# Patient Record
Sex: MALE | Race: BLACK OR AFRICAN AMERICAN | Employment: UNEMPLOYED | URBAN - METROPOLITAN AREA
[De-identification: names, ages, dates, MRNs, and addresses within clinical notes are randomized per-mention and may not be internally consistent; named-entity substitution may affect disease eponyms.]

---

## 2018-01-10 ENCOUNTER — IP HISTORICAL/CONVERTED ENCOUNTER (OUTPATIENT)
Dept: OTHER | Age: 63
End: 2018-01-10

## 2020-04-27 ENCOUNTER — OP HISTORICAL/CONVERTED ENCOUNTER (OUTPATIENT)
Dept: OTHER | Age: 65
End: 2020-04-27

## 2020-06-17 PROBLEM — C61 PROSTATE CANCER (HCC): Status: ACTIVE | Noted: 2020-06-17

## 2020-06-25 VITALS
HEIGHT: 72 IN | TEMPERATURE: 98.8 F | WEIGHT: 150 LBS | BODY MASS INDEX: 20.32 KG/M2 | DIASTOLIC BLOOD PRESSURE: 80 MMHG | SYSTOLIC BLOOD PRESSURE: 118 MMHG

## 2020-06-25 DIAGNOSIS — R97.20 ELEVATED PROSTATE SPECIFIC ANTIGEN (PSA): ICD-10-CM

## 2020-06-25 DIAGNOSIS — C61 MALIGNANT NEOPLASM OF PROSTATE (HCC): ICD-10-CM

## 2020-06-25 DIAGNOSIS — R39.9 UNSPECIFIED SYMPTOMS AND SIGNS INVOLVING THE GENITOURINARY SYSTEM: ICD-10-CM

## 2020-06-25 RX ORDER — METFORMIN HYDROCHLORIDE 500 MG/1
TABLET ORAL 2 TIMES DAILY WITH MEALS
COMMUNITY
End: 2020-11-11

## 2020-06-25 RX ORDER — ASPIRIN 81 MG/1
TABLET ORAL DAILY
COMMUNITY
End: 2022-08-08 | Stop reason: SDUPTHER

## 2020-06-30 ENCOUNTER — OP HISTORICAL/CONVERTED ENCOUNTER (OUTPATIENT)
Dept: OTHER | Age: 65
End: 2020-06-30

## 2020-08-06 ENCOUNTER — OP HISTORICAL/CONVERTED ENCOUNTER (OUTPATIENT)
Dept: OTHER | Age: 65
End: 2020-08-06

## 2020-08-10 ENCOUNTER — OFFICE VISIT (OUTPATIENT)
Dept: UROLOGY | Age: 65
End: 2020-08-10

## 2020-08-10 ENCOUNTER — OP HISTORICAL/CONVERTED ENCOUNTER (OUTPATIENT)
Dept: OTHER | Age: 65
End: 2020-08-10

## 2020-08-17 PROBLEM — F84.0 AUTISM: Status: ACTIVE | Noted: 2020-08-17

## 2020-08-17 PROBLEM — N28.1 RENAL CYST: Status: ACTIVE | Noted: 2020-08-17

## 2020-08-17 PROBLEM — K76.9 HEPATIC LESION: Status: ACTIVE | Noted: 2020-08-17

## 2020-08-17 PROBLEM — N20.0 KIDNEY STONE: Status: ACTIVE | Noted: 2020-08-17

## 2020-08-17 PROBLEM — R32 URINARY INCONTINENCE: Status: ACTIVE | Noted: 2020-08-17

## 2020-08-17 NOTE — PROGRESS NOTES
HISTORY OF PRESENT ILLNESS  Olivia Melton. is a 60 y.o. male. He is s/p prostatectomy and PLND on 8/10/2020. Pathology not available at this visit. He is here today for his 1 week f/u and voiding trial.  He was slow to be compliant with oral intake and ambulation in the hospital and his discharge was prolonged x 1 day. His sister noted some form of mild autism, though the patient does live alone and functions independently. He is s/p biopsy on 6/5/2020. He has Derrick's 6 disease in 3/12 cores with up to 70% involvment and Derrick's 3+4 disease in 1/12 cores with 80% involvement. He also has 2 additional cores suspicious for but not diagnostic of malignancy. CT scan 6/30/2020 with mildly prominent bilateral inguinal lymph nodes, maximally 1.6cm. CT also noted: hepatic lesions (likely cysts or benign hemangioma), renal cysts bilaterally. Bone scan negative for findings concerning for metastatic malignancy. He feels like he has recovered very well and he denies ongoing pain. He is still wearing his SCD sleeves. He is educated that they are ok to be removed also. He is having regular, daily BM. Problem List  Never Reviewed          Codes Class Noted    Urinary incontinence ICD-10-CM: R32  ICD-9-CM: 788.30  8/17/2020    Overview Signed 8/17/2020  9:34 AM by Haylee Leonardo NP     He is s/p prostatectomy. Luther removed today. Educated on kegels and the use of protective undergarments. Autism ICD-10-CM: F84.0  ICD-9-CM: 299.00  8/17/2020    Overview Signed 8/17/2020  9:35 AM by Haylee Leonardo NP     Sister reports a mild form of autism. Lives alone, independent. Hepatic lesion ICD-10-CM: K76.9  ICD-9-CM: 573.8  8/17/2020    Overview Signed 8/17/2020  9:36 AM by Haylee Leonardo NP     CT scan 6/30/2020 multiple hepatic lesions likley a combination of benign hemangiomata and cysts.                Renal cyst ICD-10-CM: N28.1  ICD-9-CM: 753.10  8/17/2020 Overview Signed 8/17/2020  9:36 AM by Edel Sosa NP     CT scan 6/30/2020 with bilateral renal cysts, larges ton the left at 4.9 cm. Kidney stone ICD-10-CM: N20.0  ICD-9-CM: 592.0  8/17/2020    Overview Signed 8/17/2020  9:37 AM by Edel Sosa NP     2mm non-obstructive stone seen on CT scan 6/30/2020 right lower pole. Elevated prostate specific antigen (PSA) ICD-10-CM: R97.20  ICD-9-CM: 790.93  6/25/2020    Overview Addendum 8/17/2020  9:33 AM by Edel Sosa NP     He was referred from Dr. Vickey Hollins for elevated PSA.  5/14/2020: 36.8 with 4.3% free. 2/10/2020: 36.3  1/2/2020: 29.5  Right prostate firm and enlarged. Diagnosed with Derrick's 6/7 disease on 6/5/2020. He is s/p prostatectomy on 8/10/2020. Unspecified symptoms and signs involving the genitourinary system ICD-10-CM: R39.9  ICD-9-CM: 788.99  6/25/2020    Overview Signed 6/25/2020 10:07 AM by Garrison Capone CMA     Elevated PSA with enlarged, firm prostate on exam on 5/14/2020.  06- visit Right prostate enlarged and firm, concerning in light of the elevated PSA. Malignant neoplasm of prostate [facility]) ICD-10-CM: C61  ICD-9-CM: 279  6/17/2020    Overview Addendum 8/17/2020  9:30 AM by Edel Sosa NP     PSA 36.8 with 4.3% free. He is s/p biopsy on 6/5/2020. He has Derrick's 6 disease in 3/12 cores with up to 70% involvment and Derrick's 3+4 disease in 1/12 cores with 80% involvement. He also has 2 additional cores suspicious for but not diagnostic of malignancy. He has unfavorable intermediate to high risk disease. He is s/p prostatectomy and PLND on 8/10/2020. Review of Systems   Gastrointestinal:        Per HPI   Genitourinary:        Per HPI   All other systems reviewed and are negative. Physical Exam  Vitals signs reviewed. HENT:      Head: Normocephalic and atraumatic. Eyes:      Extraocular Movements: Extraocular movements intact.    Neck: Musculoskeletal: Normal range of motion. Cardiovascular:      Rate and Rhythm: Normal rate. Pulmonary:      Effort: Pulmonary effort is normal.      Breath sounds: Normal breath sounds. Abdominal:      General: There is no distension. Palpations: Abdomen is soft. Musculoskeletal: Normal range of motion. Skin:     General: Skin is warm and dry. Neurological:      Mental Status: He is alert and oriented to person, place, and time. Mental status is at baseline. Psychiatric:         Mood and Affect: Mood normal.           ASSESSMENT and PLAN  Diagnoses and all orders for this visit:    1. Malignant neoplasm of prostate [facility])  Assessment & Plan:  He has unfavorable, intermediate to high risk disease. He is s/p prostatectomy on 8/10/2020. Pathology not available at time of this visit. F/U in 1 month with PSA prior, review pathology at that time. Key Oncology Meds     Patient is on no Oncologic meds. Key Pain Meds     The patient is on no pain meds. No results found for: WBC, WBCT, WBCPOC, ANEU, HGB, HGBPOC, HCT, HCTPOC, PLT, PLTPOC, CREA, CREAPOC, CREATEXT, BUN, IBUN, BUNPOC, ALTPOC, ALT, ASTPOC, ALB, ALBPOC, PREALB, PSA, PSA2, [id number], PSALT, HGBEXT, HCTEXT, PLTEXT    Orders:  -     PSA, DIAGNOSTIC (PROSTATE SPECIFIC AG); Future    2. Elevated prostate specific antigen (PSA)  Assessment & Plan:  He is s/p prostatectomy. Repeat PSA at 1 month. F/U after. Review pathology at that time. 3. Urinary incontinence, unspecified type    4. Autism    5. Hepatic lesion    6. Renal cyst    7.  Kidney stone      PSA in 1m    Ni Parham NP

## 2020-08-17 NOTE — ASSESSMENT & PLAN NOTE
He has unfavorable, intermediate to high risk disease. He is s/p prostatectomy on 8/10/2020. Pathology not available at time of this visit. F/U in 1 month with PSA prior, review pathology at that time. Key Oncology Meds     Patient is on no Oncologic meds. Key Pain Meds     The patient is on no pain meds.         No results found for: WBC, WBCT, WBCPOC, ANEU, HGB, HGBPOC, HCT, HCTPOC, PLT, PLTPOC, CREA, CREAPOC, CREATEXT, BUN, IBUN, BUNPOC, ALTPOC, ALT, ASTPOC, ALB, ALBPOC, PREALB, PSA, PSA2, [id number], PSALT, HGBEXT, HCTEXT, PLTEXT

## 2020-08-18 ENCOUNTER — OFFICE VISIT (OUTPATIENT)
Dept: UROLOGY | Age: 65
End: 2020-08-18
Payer: MEDICAID

## 2020-08-18 VITALS
DIASTOLIC BLOOD PRESSURE: 72 MMHG | BODY MASS INDEX: 19.91 KG/M2 | HEART RATE: 55 BPM | TEMPERATURE: 96.6 F | SYSTOLIC BLOOD PRESSURE: 118 MMHG | WEIGHT: 147 LBS | HEIGHT: 72 IN

## 2020-08-18 DIAGNOSIS — K76.9 HEPATIC LESION: ICD-10-CM

## 2020-08-18 DIAGNOSIS — N28.1 RENAL CYST: ICD-10-CM

## 2020-08-18 DIAGNOSIS — C61 MALIGNANT NEOPLASM OF PROSTATE (HCC): ICD-10-CM

## 2020-08-18 DIAGNOSIS — R97.20 ELEVATED PROSTATE SPECIFIC ANTIGEN (PSA): ICD-10-CM

## 2020-08-18 DIAGNOSIS — N20.0 KIDNEY STONE: ICD-10-CM

## 2020-08-18 DIAGNOSIS — R32 URINARY INCONTINENCE, UNSPECIFIED TYPE: ICD-10-CM

## 2020-08-18 DIAGNOSIS — F84.0 AUTISM: ICD-10-CM

## 2020-08-18 PROCEDURE — 99024 POSTOP FOLLOW-UP VISIT: CPT | Performed by: UROLOGY

## 2020-08-18 NOTE — PROGRESS NOTES
FILL & PULL:   PT WAS FILLED WITH 75 ML STERILE WATER   CATH.  WAS REMOVED WITHOUT DIFFICULTY  PT VOIDED 25ml

## 2020-08-18 NOTE — PATIENT INSTRUCTIONS
Kegel Exercise For Men     Kegel exercises in men are used to stregthen the pelvic floor just as they are for women. They are reffered to as kegels or pelvic floor exercises. The muscle is similar in both men and women, stretching from the pubic bone to the tailbone and forming a hammock- like floor that supports the organs linda the pelvis and contributes to the function of the sphincter. The exercise is recommended as a first step for treating urinary incontinence. Once you have become accustomed to tightening the muscle, Kegel exercises can be done anytime, anywhere. How do I locate the right muscles?  One of the easiest ways to locate your muscles is during urination. Once your flow begins, try to stop it completely. The muscle that you feel tightening is the urinary sphincter muscle or pelvic floor. Dont tense the muscles in your buttocks, legs or abdomen and dont hold your breath.  When you can slow or stop the flow of urine, you've succesfully located these muscles. When you perform the exercise correctly, You should be able to feel or see penis or the testicles lift.  Some men find these muscles by imaging that they are trying to stop the passage of gas. If you dont get it, you may try inserting a finger (use lubrication) into the anus and try to  your finger. If you are able to do that, you've found the right muscle     When you're first starting, it may be easier to do kegel exercises lying down, so your muscles arent fighting against gravity. It may also be easier to contract the muscles for just two or three seconds at first. There are several ways to perform kegel exercises. At first you may need to perform these exercises while sitting or lying down. As the muscles stregthen you can progress to exercise standing up. Like any activity, start with what you can achieve and progress from there. Remember to use your muscles whenever you exert yourself during your daily activities.  Do NOT hold your breath   Do NOT push down instead of squeeze and lift    Do NOT pull your tummy in tightly    Do NOT tighten your buttocks and thighs     In order to improve strength of the muscle, you use a squeeze, hold, release pattern. You hold for several seconds and release. Gradually, you would like to be able to build up to a hold of  squeeze, hold and release 5 to 10 times in a row. When you perform the exercise correctly, you should be able to feel or see penis or the testicles lift. This takes time for some men. But, if you practice the routine regularly, you should notice an improvement in 4-6 weeks. A few good contractions are more beneficial than many half- hearted ones and good results take time and effort. Your doctor recommends a goal of 5 sets of 5 contractions daily. Remember them when you wake, when you sleep and every meal.     Remember when to use the muscles when you need them most. That is, always tighten before you cough, sneeze, lift, bend or get up out of a chair, ect. Your doctor does not recommend interrupting your flow as a way to practice kegels. It is only a method to help identify the muscles. The urinary sphincter muscles matter more than the anal sphincter muscles. Some men can isolate these forward muscles rather than squeezing the entire pelvic floor. Hints:    Keep your weight within a healthy range for your height and age   Drink enough water to keep the urine light in color    Seek medical advice for chronic cough    Develope good bowel habits     If you have trouble performing kegel exercises on your own, you may need help. Improved results can be achieved by seeking help from physiotherapist (with traning in continence) who will design an individual training program exspecially suited to you.  Call your doctor if you would like to participate in a training program with a physical therapist.

## 2020-09-10 DIAGNOSIS — C61 MALIGNANT NEOPLASM OF PROSTATE (HCC): ICD-10-CM

## 2020-09-21 ENCOUNTER — OFFICE VISIT (OUTPATIENT)
Dept: INTERNAL MEDICINE CLINIC | Age: 65
End: 2020-09-21
Payer: MEDICAID

## 2020-09-21 ENCOUNTER — TELEPHONE (OUTPATIENT)
Dept: UROLOGY | Age: 65
End: 2020-09-21

## 2020-09-21 VITALS
RESPIRATION RATE: 18 BRPM | DIASTOLIC BLOOD PRESSURE: 84 MMHG | OXYGEN SATURATION: 99 % | BODY MASS INDEX: 20.94 KG/M2 | WEIGHT: 154.6 LBS | HEART RATE: 61 BPM | SYSTOLIC BLOOD PRESSURE: 122 MMHG | TEMPERATURE: 98.1 F | HEIGHT: 72 IN

## 2020-09-21 DIAGNOSIS — E08.9 DIABETES MELLITUS DUE TO UNDERLYING CONDITION WITHOUT COMPLICATION, WITHOUT LONG-TERM CURRENT USE OF INSULIN (HCC): ICD-10-CM

## 2020-09-21 DIAGNOSIS — R97.20 ELEVATED PROSTATE SPECIFIC ANTIGEN (PSA): ICD-10-CM

## 2020-09-21 DIAGNOSIS — C61 MALIGNANT NEOPLASM OF PROSTATE (HCC): ICD-10-CM

## 2020-09-21 DIAGNOSIS — C61 PROSTATE CARCINOMA (HCC): Primary | ICD-10-CM

## 2020-09-21 DIAGNOSIS — H25.011 CORTICAL AGE-RELATED CATARACT OF RIGHT EYE: ICD-10-CM

## 2020-09-21 PROCEDURE — 99214 OFFICE O/P EST MOD 30 MIN: CPT | Performed by: INTERNAL MEDICINE

## 2020-09-21 NOTE — TELEPHONE ENCOUNTER
Spoke with pt to remind him to get psa drawn prior to appointment tomorrow. Pt said he would try. . didn't seem like he was up to it.    Order is in

## 2020-09-21 NOTE — PROGRESS NOTES
Toi Dee. is a 60 y.o. male and presents with No chief complaint on file. Patient status post robotic prostatectomy for prostate carcinoma doing well well-healed scars no urinary incontinence very pleasant undergoing right cataract surgery seen by Dr. Dallas Singh and I will send records. We reviewed his medication continues on metformin probenecid Colcrys as needed for gout lungs clear heart rate regular rate and rhythm General exam performed for preop he has lost weight and gained some back and wishes not to change the 150 pounds he is at this time we reviewed last labs June of this year revealing creatinine 0.91 prostate specific antigen 36.3 BUN 18 hemoglobin A1c 14 patient will try to watch diet bilateral cataracts noted           Review of Systems  Constitutional: negative for fevers, chills, anorexia, noted weight loss and fatigue,no insomnia  Eyes:   negative for visual disturbance and irritation, eye discharge, eye pain. no eye redness. ENT:   negative for tinnitus, sore throat, nasal congestion, ear pain, hoarseness, hearing loss.,no snoring. Respiratory:  negative for cough, hemoptysis, shortness of breath, wheezing,  CV:   negative for chest pain, palpitations, lower extremity edema, shortness of breath while sitting, walking or at night  GI:   negative for nausea, vomiting, diarrhea, abdominal pain,melena,constipation. Endo:               negative for polyuria, polydipsia, polyphagia, cold or heat intolerance,hair loss. Genitourinary: negative for frequency, dysuria and hematuria,urethral discharge,nocturia.straining while urination,urinary incontinence. Integument:  negative for rash and pruritus  Hematologic:  negative for easy bruising and gum/nose bleeding, enlarged nodes  Musculoskel: negative for myalgias, arthralgias, back pain, muscle weakness, noted joint pain, h/o fall,cramps,calf pain.   DJD changes  Neurological:  negative for headaches, dizziness, vertigo, memory problems, gait and seizures loss of consciousness,no ataxia. Behavl/Psych: negative for feelings of anxiety, depression, occasional mood changes ,sadness    Past Medical History:   Diagnosis Date    Arthritis     Diabetes ([facility] [address]     Elevated prostate specific antigen (PSA) 6/25/2020    He is referred from Dr. Ronak Villalobos for elevated PSA. 5/14/2020: 36.8 with 4.3% free. 2/10/2020: 36.3 1/2/2020: 29.5 Right prostate firm and enlarged. 06- visit He has a markedly elevated PSA. He underwent biopsy today, 6/5/20. He is instructed to take his antibiotics as prescribed. He was given post procedure instructions.  Unspecified symptoms and signs involving the genitourinary system 6/25/2020    Elevated PSA with enlarged, firm prostate on exam on 5/14/2020. 06- visit Right prostate enlarged and firm, concerning in light of the elevated PSA. Past Surgical History:   Procedure Laterality Date    HX OTHER SURGICAL      PROCEDURE ON EAR     HX OTHER SURGICAL      PROCEDURE ON EAR     HX PROSTATE SURGERY  08/10/2020     pelvic lymph node dissection    HX PROSTATECTOMY  08/10/2020    [place] PROSTATE BIOPSY, NEEDLE, SATURATION SAMPLING  06/05/2020     Social History     Socioeconomic History    Marital status: SINGLE     Spouse name: Not on file    Number of children: Not on file    Years of education: Not on file    Highest education level: Not on file   Occupational History    Occupation: RETIRED   Tobacco Use    Smoking status: Never Smoker    Smokeless tobacco: Never Used   Substance and Sexual Activity    Alcohol use: Not Currently    Drug use: Never   Other Topics Concern     Family History   Problem Relation Age of Onset    Diabetes Mother     Cancer Father      Current Outpatient Medications   Medication Sig Dispense Refill    aspirin delayed-release 81 mg tablet Take  by mouth daily.  metFORMIN (GLUCOPHAGE) 500 mg tablet Take  by mouth two (2) times daily (with meals).        No Known Allergies    Objective:  Visit Vitals  /84 (BP 1 Location: Right arm, BP Patient Position: Sitting)   Pulse 61   Temp 98.1 °F (36.7 °C) (Oral)   Resp 18   Ht 6' (1.829 m)   Wt 154 lb 9.6 oz (70.1 kg)   SpO2 99%   BMI 20.97 kg/m²       Physical Exam:   Constitutional: General Appearance: healthy-appearing and obese. Level of Distress: NAD. Ambulation: ambulating normally. Psychiatric: Mental Status: normal mood and affect and active and alert. Orientation: to time, place, and person. no agitation. ,normal eye contact. normal insight  Head: Head: normocephalic and atraumatic. Eyes: Pupils: PERRLA. Sclerae: non-icteric. ENMT: No lesions on external ear, no hearing loss. No lesions on external nose, sinus tenderness, or nasal discharge. Lips, Teeth, and no mouth or lip ulcers   Neck: Neck: supple, trachea midline, and no masses. Lymph Nodes: no cervical LAD. Thyroid: no enlargement or nodules and non-tender. Lungs: Respiratory effort: no dyspnea. Auscultation: no wheezing, rales/crackles, or rhonchi and breath sounds normal and good air movement. Cardiovascular: Apical Impulse: not displaced. Heart Auscultation: normal S1 and S2; no murmurs, rubs, or gallops; and RRR. Neck vessels: no carotid bruits. Pulses including femoral / pedal: normal throughout. Abdomen: Bowel Sounds: normal. Inspection and Palpation: no tenderness, guarding, or masses and soft and non-distended. Liver: non-tender and no hepatomegaly. Spleen: non-tender and no splenomegaly  Musculoskeletal[de-identified] Extremities: no edema or varicosities. Calf tenderness. DJD changes  Neurologic: Gait and Station: normal gait and station. Motor Strength normal right and left. Sensory and cerebellar intact. Skin: Inspection and palpation: no rash, lesions, or ulcer. No results found for this or any previous visit. Assessment/Plan:    ICD-10-CM ICD-9-CM    1.  Prostate carcinoma ([facility] [address]  D73 404 METABOLIC PANEL, BASIC      CBC WITH AUTOMATED DIFF PSA W/ REFLX FREE PSA   2. Diabetes mellitus due to underlying condition without complication, without long-term current use of insulin (HCC)  E08.9 249.00 HEMOGLOBIN A1C WITH EAG   3. Malignant neoplasm of prostate ([facility] [address]  C61 185    4. Elevated prostate specific antigen (PSA)  R97.20 790.93    5. Cortical age-related cataract of right eye  H25.011 366.15      Orders Placed This Encounter    METABOLIC PANEL, BASIC    HEMOGLOBIN A1C WITH EAG    CBC WITH AUTOMATED DIFF    PSA W/ REFLX FREE PSA       continue present plan, routine labs ordered, call if any problems    There are no Patient Instructions on file for this visit. Follow-up and Dispositions    · Return in about 6 months (around 3/21/2021).

## 2020-09-22 ENCOUNTER — OFFICE VISIT (OUTPATIENT)
Dept: UROLOGY | Age: 65
End: 2020-09-22
Payer: MEDICAID

## 2020-09-22 VITALS — BODY MASS INDEX: 20.59 KG/M2 | HEIGHT: 72 IN | TEMPERATURE: 97.6 F | WEIGHT: 152 LBS

## 2020-09-22 DIAGNOSIS — N28.1 RENAL CYST: ICD-10-CM

## 2020-09-22 DIAGNOSIS — R32 URINARY INCONTINENCE, UNSPECIFIED TYPE: ICD-10-CM

## 2020-09-22 DIAGNOSIS — F84.0 AUTISM: ICD-10-CM

## 2020-09-22 DIAGNOSIS — C61 MALIGNANT NEOPLASM OF PROSTATE (HCC): Primary | ICD-10-CM

## 2020-09-22 LAB
BASOPHILS # BLD AUTO: 0 X10E3/UL (ref 0–0.2)
BASOPHILS NFR BLD AUTO: 1 %
BILIRUB UR QL STRIP: NEGATIVE
BUN SERPL-MCNC: 18 MG/DL (ref 8–27)
BUN/CREAT SERPL: 22 (ref 10–24)
CALCIUM SERPL-MCNC: 9.9 MG/DL (ref 8.6–10.2)
CHLORIDE SERPL-SCNC: 101 MMOL/L (ref 96–106)
CO2 SERPL-SCNC: 23 MMOL/L (ref 20–29)
CREAT SERPL-MCNC: 0.82 MG/DL (ref 0.76–1.27)
EOSINOPHIL # BLD AUTO: 0.1 X10E3/UL (ref 0–0.4)
EOSINOPHIL NFR BLD AUTO: 3 %
ERYTHROCYTE [DISTWIDTH] IN BLOOD BY AUTOMATED COUNT: 13.4 % (ref 11.6–15.4)
EST. AVERAGE GLUCOSE BLD GHB EST-MCNC: 154 MG/DL
GLUCOSE SERPL-MCNC: 112 MG/DL (ref 65–99)
GLUCOSE UR-MCNC: NEGATIVE MG/DL
HBA1C MFR BLD: 7 % (ref 4.8–5.6)
HCT VFR BLD AUTO: 44 % (ref 37.5–51)
HGB BLD-MCNC: 14.2 G/DL (ref 13–17.7)
IMM GRANULOCYTES # BLD AUTO: 0 X10E3/UL (ref 0–0.1)
IMM GRANULOCYTES NFR BLD AUTO: 0 %
KETONES P FAST UR STRIP-MCNC: NEGATIVE MG/DL
LYMPHOCYTES # BLD AUTO: 1.5 X10E3/UL (ref 0.7–3.1)
LYMPHOCYTES NFR BLD AUTO: 44 %
MCH RBC QN AUTO: 29.9 PG (ref 26.6–33)
MCHC RBC AUTO-ENTMCNC: 32.3 G/DL (ref 31.5–35.7)
MCV RBC AUTO: 93 FL (ref 79–97)
MONOCYTES # BLD AUTO: 0.3 X10E3/UL (ref 0.1–0.9)
MONOCYTES NFR BLD AUTO: 8 %
NEUTROPHILS # BLD AUTO: 1.5 X10E3/UL (ref 1.4–7)
NEUTROPHILS NFR BLD AUTO: 44 %
PH UR STRIP: 5 [PH] (ref 4.6–8)
PLATELET # BLD AUTO: 323 X10E3/UL (ref 150–450)
POTASSIUM SERPL-SCNC: 4.3 MMOL/L (ref 3.5–5.2)
PROT UR QL STRIP: NORMAL
PSA SERPL-MCNC: <0.1 NG/ML (ref 0–4)
RBC # BLD AUTO: 4.75 X10E6/UL (ref 4.14–5.8)
REFLEX CRITERIA: NORMAL
SODIUM SERPL-SCNC: 140 MMOL/L (ref 134–144)
SP GR UR STRIP: 1.02 (ref 1–1.03)
UA UROBILINOGEN AMB POC: NORMAL (ref 0.2–1)
URINALYSIS CLARITY POC: CLEAR
URINALYSIS COLOR POC: YELLOW
URINE BLOOD POC: NORMAL
URINE LEUKOCYTES POC: NEGATIVE
URINE NITRITES POC: NEGATIVE
WBC # BLD AUTO: 3.4 X10E3/UL (ref 3.4–10.8)

## 2020-09-22 PROCEDURE — 99024 POSTOP FOLLOW-UP VISIT: CPT | Performed by: UROLOGY

## 2020-09-22 PROCEDURE — 81003 URINALYSIS AUTO W/O SCOPE: CPT | Performed by: UROLOGY

## 2020-09-22 NOTE — PROGRESS NOTES
HISTORY OF PRESENT ILLNESS  Noemi Peralta is a 60 y.o. male. Chief Complaint   Patient presents with    Follow-up    Prostate Cancer    Urinary Incontinence    Elevated PSA     He is doing well s/p a prostatectomy 8/20/20. High risk disease. He had a PSA drawn yesterday, not resulted yet. Problem List  Date Reviewed: 9/21/2020          Codes Class Noted    Urinary incontinence ICD-10-CM: R32  ICD-9-CM: 788.30  8/17/2020    Overview Addendum 9/21/2020  3:14 PM by Netta Tovar NP     He is s/p prostatectomy 8/2020. Educated on kegels and the use of protective undergarments. Autism ICD-10-CM: F84.0  ICD-9-CM: 299.00  8/17/2020    Overview Signed 8/17/2020  9:35 AM by Netta Tovar NP     Sister reports a mild form of autism. Lives alone, independent. Hepatic lesion ICD-10-CM: K76.9  ICD-9-CM: 573.8  8/17/2020    Overview Signed 8/17/2020  9:36 AM by Netta Tovar NP     CT scan 6/30/2020 multiple hepatic lesions likley a combination of benign hemangiomata and cysts. Renal cyst ICD-10-CM: N28.1  ICD-9-CM: 753.10  8/17/2020    Overview Addendum 9/21/2020  3:15 PM by Netta Tovar NP     CT scan 6/30/2020 with bilateral renal cysts, largest on the left at 4.9 cm. Kidney stone ICD-10-CM: N20.0  ICD-9-CM: 592.0  8/17/2020    Overview Signed 8/17/2020  9:37 AM by Netta Tovar NP     2mm non-obstructive stone seen on CT scan 6/30/2020 right lower pole. Elevated prostate specific antigen (PSA) ICD-10-CM: R97.20  ICD-9-CM: 790.93  6/25/2020    Overview Addendum 8/17/2020  9:33 AM by Netta Tovar NP     He was referred from Dr. Jami Alvarez for elevated PSA.  5/14/2020: 36.8 with 4.3% free. 2/10/2020: 36.3  1/2/2020: 29.5  Right prostate firm and enlarged. Diagnosed with Derrick's 6/7 disease on 6/5/2020. He is s/p prostatectomy on 8/10/2020.              Unspecified symptoms and signs involving the genitourinary system ICD-10-CM: R39.9  ICD-9-CM: 788.99  6/25/2020    Overview Signed 6/25/2020 10:07 AM by Kaylin Muñoz CMA     Elevated PSA with enlarged, firm prostate on exam on 5/14/2020.  06- visit Right prostate enlarged and firm, concerning in light of the elevated PSA. Malignant neoplasm of prostate [facility]) ICD-10-CM: C61  ICD-9-CM: 555  6/17/2020    Overview Addendum 9/22/2020  9:59 AM by Antwon Mahan MD     PSA 36.8 with 4.3% free. He is s/p biopsy on 6/5/2020. He has Derrick's 6 disease in 3/12 cores with up to 70% involvment and Derrick's 3+4 disease in 1/12 cores with 80% involvement. He also has 2 additional cores suspicious for but not diagnostic of malignancy. He has unfavorable intermediate to high risk disease. He is s/p prostatectomy and PLND on 8/10/2020. Bladder neck margin positive, Pritchett's 4+4, [id number]. Review of Systems   All other systems reviewed and are negative. Past Medical History:   Diagnosis Date    Arthritis     Diabetes ([facility] [address]     Elevated prostate specific antigen (PSA) 6/25/2020    He is referred from Dr. Gregory Nj for elevated PSA. 5/14/2020: 36.8 with 4.3% free. 2/10/2020: 36.3 1/2/2020: 29.5 Right prostate firm and enlarged. 06- visit He has a markedly elevated PSA. He underwent biopsy today, 6/5/20. He is instructed to take his antibiotics as prescribed. He was given post procedure instructions.  Unspecified symptoms and signs involving the genitourinary system 6/25/2020    Elevated PSA with enlarged, firm prostate on exam on 5/14/2020. 06- visit Right prostate enlarged and firm, concerning in light of the elevated PSA.       Past Surgical History:   Procedure Laterality Date    HX OTHER SURGICAL      PROCEDURE ON EAR     HX OTHER SURGICAL      PROCEDURE ON EAR     HX PROSTATE SURGERY  08/10/2020     pelvic lymph node dissection    HX PROSTATECTOMY  08/10/2020    [place] PROSTATE BIOPSY, NEEDLE, SATURATION SAMPLING 06/05/2020     Family History   Problem Relation Age of Onset    Diabetes Mother     Cancer Father         Physical Exam  Constitutional:       Appearance: Normal appearance. HENT:      Head: Normocephalic and atraumatic. Nose: Nose normal.   Eyes:      Extraocular Movements: Extraocular movements intact. Conjunctiva/sclera: Conjunctivae normal.   Pulmonary:      Effort: Pulmonary effort is normal.   Neurological:      Mental Status: He is alert and oriented to person, place, and time. Psychiatric:         Mood and Affect: Mood normal.         Behavior: Behavior normal.           Results for orders placed or performed in visit on 09/22/20   AMB POC URINALYSIS DIP STICK AUTO W/O MICRO     Status: None   Result Value Ref Range Status    Color (UA POC) Yellow  Final    Clarity (UA POC) Clear  Final    Glucose (UA POC) Negative Negative Final    Bilirubin (UA POC) Negative Negative Final    Ketones (UA POC) Negative Negative Final    Specific gravity (UA POC) 1.025 1.001 - 1.035 Final    Blood (UA POC) Trace Negative Final    pH (UA POC) 5.0 4.6 - 8.0 Final    Protein (UA POC) 3+ Negative Final    Urobilinogen (UA POC) 0.2 mg/dL 0.2 - 1 Final    Nitrites (UA POC) Negative Negative Final    Leukocyte esterase (UA POC) Negative Negative Final               No results found for: PVRPOC  No results found for: PSA2, PSAR1     ASSESSMENT and PLAN  Diagnoses and all orders for this visit:    1. Malignant neoplasm of prostate [facility])  Assessment & Plan:  High risk disease s/p prostatectomy. Check PSA as ordered. Decipher testing. He will benefit from adjuvant therapy. He is not interested in early treatment. Orders:  -     PSA, DIAGNOSTIC (PROSTATE SPECIFIC AG); Future    2. Urinary incontinence, unspecified type  -     AMB POC URINALYSIS DIP STICK AUTO W/O MICRO  -     URINALYSIS W/MICROSCOPIC    3. Renal cyst    4.  Autism         Follow-up and Dispositions    · Return in about 2 months (around 11/22/2020) for PSA prior.          Grant Orellana MD

## 2020-09-22 NOTE — ASSESSMENT & PLAN NOTE
High risk disease s/p prostatectomy. Check PSA as ordered. Decipher testing. He will benefit from adjuvant therapy. He is not interested in early treatment.

## 2020-09-23 LAB
APPEARANCE UR: CLEAR
BACTERIA #/AREA URNS HPF: ABNORMAL /[HPF]
BILIRUB UR QL STRIP: NEGATIVE
CASTS URNS QL MICRO: ABNORMAL /LPF
COLOR UR: YELLOW
EPI CELLS #/AREA URNS HPF: ABNORMAL /HPF (ref 0–10)
GLUCOSE UR QL: NEGATIVE
HGB UR QL STRIP: ABNORMAL
KETONES UR QL STRIP: NEGATIVE
LEUKOCYTE ESTERASE UR QL STRIP: ABNORMAL
MICRO URNS: ABNORMAL
MUCOUS THREADS URNS QL MICRO: PRESENT
NITRITE UR QL STRIP: NEGATIVE
PH UR STRIP: 5 [PH] (ref 5–7.5)
PROT UR QL STRIP: ABNORMAL
RBC #/AREA URNS HPF: >30 /HPF (ref 0–2)
SP GR UR: 1.02 (ref 1–1.03)
UROBILINOGEN UR STRIP-MCNC: 0.2 MG/DL (ref 0.2–1)
WBC #/AREA URNS HPF: ABNORMAL /HPF (ref 0–5)

## 2020-09-23 NOTE — PROGRESS NOTES
Call patient to know all results are all good they cannot detect PSA at all which is good please send copies of these labs to his urologist

## 2020-11-03 ENCOUNTER — OFFICE VISIT (OUTPATIENT)
Dept: PODIATRY | Age: 65
End: 2020-11-03
Payer: MEDICAID

## 2020-11-03 VITALS
WEIGHT: 159.8 LBS | DIASTOLIC BLOOD PRESSURE: 82 MMHG | TEMPERATURE: 99.1 F | HEIGHT: 72 IN | BODY MASS INDEX: 21.64 KG/M2 | OXYGEN SATURATION: 99 % | HEART RATE: 58 BPM | SYSTOLIC BLOOD PRESSURE: 122 MMHG

## 2020-11-03 DIAGNOSIS — L85.3 XEROSIS CUTIS: ICD-10-CM

## 2020-11-03 DIAGNOSIS — E11.42 DIABETIC POLYNEUROPATHY ASSOCIATED WITH TYPE 2 DIABETES MELLITUS (HCC): ICD-10-CM

## 2020-11-03 DIAGNOSIS — B35.1 ONYCHOMYCOSIS: Primary | ICD-10-CM

## 2020-11-03 DIAGNOSIS — E11.9 TYPE 2 DIABETES MELLITUS WITHOUT COMPLICATION, WITHOUT LONG-TERM CURRENT USE OF INSULIN (HCC): ICD-10-CM

## 2020-11-03 PROCEDURE — 11721 DEBRIDE NAIL 6 OR MORE: CPT | Performed by: PODIATRIST

## 2020-11-03 PROCEDURE — 99204 OFFICE O/P NEW MOD 45 MIN: CPT | Performed by: PODIATRIST

## 2020-11-03 PROCEDURE — 11755 BIOPSY NAIL UNIT: CPT | Performed by: PODIATRIST

## 2020-11-03 RX ORDER — OFLOXACIN 3 MG/ML
3 SOLUTION/ DROPS OPHTHALMIC 4 TIMES DAILY
COMMUNITY
End: 2021-12-16 | Stop reason: ALTCHOICE

## 2020-11-03 RX ORDER — KETOROLAC TROMETHAMINE 5 MG/ML
1 SOLUTION OPHTHALMIC 4 TIMES DAILY
COMMUNITY
End: 2021-12-16 | Stop reason: ALTCHOICE

## 2020-11-03 RX ORDER — GABAPENTIN 300 MG/1
300 CAPSULE ORAL 2 TIMES DAILY
Qty: 60 CAP | Refills: 2 | Status: SHIPPED | OUTPATIENT
Start: 2020-11-03 | End: 2021-02-23 | Stop reason: SDUPTHER

## 2020-11-03 NOTE — PROGRESS NOTES
[facility]    Subjective:         Patient is a 60 y.o. male who is being seen as a new pt for lower extremity pedal exam.  He states he has close follow-up with his PCP and that his diabetes is under control. Patient states he is suffering from acute numbness and tingling in his feet. He also states his toenails are thick/discolored but denies ever having testing performed to confirm a diagnosis. He denies any overt foot pain. He denies any recent trauma. He denies any systemic signs of infection. He states he is tried multiple over-the-counter medications regarding his thick/discolored toenails without relief of his symptoms. He denies any other lower extremity complaints    Past Medical History:   Diagnosis Date    Arthritis     Diabetes ([facility] [address]     Elevated prostate specific antigen (PSA) 6/25/2020    He is referred from Dr. Terrence Ruelas for elevated PSA. 5/14/2020: 36.8 with 4.3% free. 2/10/2020: 36.3 1/2/2020: 29.5 Right prostate firm and enlarged. 06- visit He has a markedly elevated PSA. He underwent biopsy today, 6/5/20. He is instructed to take his antibiotics as prescribed. He was given post procedure instructions.  Unspecified symptoms and signs involving the genitourinary system 6/25/2020    Elevated PSA with enlarged, firm prostate on exam on 5/14/2020. 06- visit Right prostate enlarged and firm, concerning in light of the elevated PSA.      Past Surgical History:   Procedure Laterality Date    HX OTHER SURGICAL      PROCEDURE ON EAR     HX OTHER SURGICAL      PROCEDURE ON EAR     HX PROSTATE SURGERY  08/10/2020     pelvic lymph node dissection    HX PROSTATECTOMY  08/10/2020    [place] PROSTATE BIOPSY, NEEDLE, SATURATION SAMPLING  06/05/2020       Family History   Problem Relation Age of Onset    Diabetes Mother     Cancer Father       Social History     Tobacco Use    Smoking status: Never Smoker    Smokeless tobacco: Never Used   Substance Use Topics    Alcohol use: Not Currently     No Known Allergies  Prior to Admission medications    Medication Sig Start Date End Date Taking? Authorizing Provider   ofloxacin (FLOXIN) 0.3 % ophthalmic solution Administer 3 Drops to both eyes four (4) times daily. Yes Provider, Historical   ketorolac (ACULAR) 0.5 % ophthalmic solution Administer 1 Drop to both eyes four (4) times daily. Yes Provider, Historical   gabapentin (NEURONTIN) 300 mg capsule Take 1 Cap by mouth two (2) times a day. Max Daily Amount: 600 mg. 11/3/20  Yes Katrin Wilson, DPM   aspirin delayed-release 81 mg tablet Take  by mouth daily. Yes Provider, Historical   metFORMIN (GLUCOPHAGE) 500 mg tablet Take  by mouth two (2) times daily (with meals). Yes Provider, Historical       Review of Systems   Constitutional: Negative. HENT: Negative. Eyes: Negative. Respiratory: Negative. Cardiovascular: Negative. Endocrine: Negative. Musculoskeletal: Negative. Allergic/Immunologic: Positive for immunocompromised state. Neurological: Positive for numbness. Hematological: Negative. Psychiatric/Behavioral: Negative. All other systems reviewed and are negative. Objective:     Visit Vitals  /82 (BP 1 Location: Right arm, BP Patient Position: Sitting)   Pulse (!) 58   Temp 99.1 °F (37.3 °C) (Temporal)   Ht 6' (1.829 m)   Wt 159 lb 12.8 oz (72.5 kg)   SpO2 99%   BMI 21.67 kg/m²       Physical Exam  Vitals signs reviewed. Constitutional:       Appearance: He is normal weight. Cardiovascular:      Pulses:           Dorsalis pedis pulses are 2+ on the right side and 2+ on the left side. Posterior tibial pulses are 2+ on the right side and 2+ on the left side. Pulmonary:      Effort: Pulmonary effort is normal.   Musculoskeletal:      Right foot: Normal range of motion. No deformity or bunion. Left foot: Normal range of motion. No deformity or bunion.    Feet:      Right foot:      Protective Sensation: 10 sites tested. 10 sites sensed. Skin integrity: Dry skin present. Toenail Condition: Right toenails are abnormally thick. Fungal disease present. Left foot:      Protective Sensation: 10 sites tested. 10 sites sensed. Skin integrity: Dry skin present. Toenail Condition: Left toenails are abnormally thick. Fungal disease present. Lymphadenopathy:      Lower Body: No right inguinal adenopathy. No left inguinal adenopathy. Skin:     General: Skin is warm. Capillary Refill: Capillary refill takes 2 to 3 seconds. Neurological:      Mental Status: He is alert and oriented to person, place, and time. Psychiatric:         Mood and Affect: Mood and affect normal.         Behavior: Behavior is cooperative. Data Review: No results found for this or any previous visit (from the past 24 hour(s)). Impression:       ICD-10-CM ICD-9-CM    1. Onychomycosis  B35.1 110.1 BIOPSY, NAIL UNIT   2. Type 2 diabetes mellitus without complication, without long-term current use of insulin ([facility])  E11.9 250.00    3. Diabetic polyneuropathy associated with type 2 diabetes mellitus ([facility])  E11.42 250.60 gabapentin (NEURONTIN) 300 mg capsule     357.2    4. Xerosis cutis  L85.3 706.8          Recommendation:     Patient seen and evaluated in the office  Discussed and educated patient regarding their current medical condition. Instructed patient to monitor their feet daily, be compliant with all medications and wear supportive shoe gear. A sharp toenail plate debridement was performed to all 10 pedal digits with a nail nipper. This was performed without incident no dressings needed  It was determined that a toenail plate biopsy was taken of the right hallux for the presence of fungal elements. This was performed with a nail nipper without incident. Patient tolerated well no dressing was needed.   Instructed patient that when the pathology results return, will discuss treatment options in more depth  A prescription was sent for gabapentin 300 mg to be taken twice daily for symptomatic relief of his diabetic peripheral neuropathy  A prescription was sent for ammonium lactate 12% lotion to be applied twice daily to affected dry skin

## 2020-11-11 RX ORDER — METFORMIN HYDROCHLORIDE 500 MG/1
TABLET ORAL
Qty: 270 TAB | Refills: 0 | Status: SHIPPED | OUTPATIENT
Start: 2020-11-11 | End: 2021-02-11

## 2020-11-16 DIAGNOSIS — C61 MALIGNANT NEOPLASM OF PROSTATE (HCC): ICD-10-CM

## 2020-11-20 ENCOUNTER — TELEPHONE (OUTPATIENT)
Dept: UROLOGY | Age: 65
End: 2020-11-20

## 2020-11-23 PROBLEM — R39.9 UNSPECIFIED SYMPTOMS AND SIGNS INVOLVING THE GENITOURINARY SYSTEM: Status: RESOLVED | Noted: 2020-06-25 | Resolved: 2020-11-23

## 2020-11-23 PROBLEM — R97.20 ELEVATED PROSTATE SPECIFIC ANTIGEN (PSA): Status: RESOLVED | Noted: 2020-06-25 | Resolved: 2020-11-23

## 2020-11-23 PROBLEM — N52.9 IMPOTENCE: Status: ACTIVE | Noted: 2020-11-23

## 2020-11-23 NOTE — PROGRESS NOTES
HISTORY OF PRESENT ILLNESS  Johanny Cervantes is a 60 y.o. male. Chief Complaint   Patient presents with    Follow-up    Prostate Cancer    Kidney Caty Wicho Urinary Incontinence     He is s/p prostatectomy and PLND on 8/10/2020. Bladder neck margin positive, Derrick's 4+4, pT3aN0. PSA undetectable 9/22/2020. He had his blood drawn today. He is happy with his urine control. He is not willing to talk about sexual function yet. Chronic Conditions Addressed Today     1. Malignant neoplasm of prostate ([facility] [address] - Primary     Overview      PSA 36.8 with 4.3% free. He is s/p biopsy on 6/5/2020. He has Derrick's 6 disease in 3/12 cores with up to 70% involvment and [place]'s 3+4 disease in 1/12 cores with 80% involvement. He also has 2 additional cores suspicious for but not diagnostic of malignancy. He has unfavorable intermediate to high risk disease. He is s/p prostatectomy and PLND on 8/10/2020. Bladder neck margin positive, [place]'s 4+4, pT3aN0. PSA undetectable 9/22/2020. Current Assessment & Plan      He is s/p prostatectomy on 8/10/2020. Bladder neck margin was positive. PT3aN0. Derrick's 4+4 disease. PSA was undetectable at 1 month. He is due for PSA today. Follow up in 3 months. Key Oncology Meds     Patient is on no Oncologic meds. Key Pain Meds     The patient is on no pain meds. Lab Results   Component Value Date/Time    WBC 3.4 09/21/2020 11:40 AM    ABS. NEUTROPHILS 1.5 09/21/2020 11:40 AM    HGB 14.2 09/21/2020 11:40 AM    HCT 44.0 09/21/2020 11:40 AM    PLATELET 703 61/12/1621 11:40 AM    Creatinine 0.82 09/21/2020 11:40 AM    BUN 18 09/21/2020 11:40 AM    Prostate Specific Ag <0.1 09/21/2020 11:40 AM            2. Urinary incontinence     Overview      He is s/p prostatectomy 8/2020. Educated on kegels and the use of protective undergarments. 3. Autism     Overview      Sister reports a mild form of autism. Lives alone, independent. 4. Kidney stone     Overview      2mm non-obstructive stone seen on CT scan 6/30/2020 right lower pole. Current Assessment & Plan      Tiny stone, not an issue. 5. Impotence     Overview      He is s/p prostatectomy 8/2020. Review of Systems   All other systems reviewed and are negative. Past Medical History:   Diagnosis Date    Arthritis     Diabetes ([facility] [address]     Elevated prostate specific antigen (PSA) 6/25/2020    He is referred from Dr. Christiano Mccloud for elevated PSA. 5/14/2020: 36.8 with 4.3% free. 2/10/2020: 36.3 1/2/2020: 29.5 Right prostate firm and enlarged. 06- visit He has a markedly elevated PSA. He underwent biopsy today, 6/5/20. He is instructed to take his antibiotics as prescribed. He was given post procedure instructions.  Unspecified symptoms and signs involving the genitourinary system 6/25/2020    Elevated PSA with enlarged, firm prostate on exam on 5/14/2020. 06- visit Right prostate enlarged and firm, concerning in light of the elevated PSA. Past Surgical History:   Procedure Laterality Date    HX OTHER SURGICAL      PROCEDURE ON EAR     HX OTHER SURGICAL      PROCEDURE ON EAR     HX PROSTATE SURGERY  08/10/2020     pelvic lymph node dissection    HX PROSTATECTOMY  08/10/2020    [place] PROSTATE BIOPSY, NEEDLE, SATURATION SAMPLING  06/05/2020     Family History   Problem Relation Age of Onset    Diabetes Mother     Cancer Father         Physical Exam  Constitutional:       Appearance: Normal appearance. HENT:      Head: Normocephalic and atraumatic. Nose: Nose normal.   Eyes:      Extraocular Movements: Extraocular movements intact. Conjunctiva/sclera: Conjunctivae normal.   Pulmonary:      Effort: Pulmonary effort is normal.   Neurological:      General: No focal deficit present. Mental Status: He is alert and oriented to person, place, and time.    Psychiatric:         Mood and Affect: Mood normal. ASSESSMENT and PLAN  Diagnoses and all orders for this visit:    1. Malignant neoplasm of prostate [facility])  Assessment & Plan:  He is s/p prostatectomy on 8/10/2020. Bladder neck margin was positive. PT3aN0. Derrick's 4+4 disease. PSA was undetectable at 1 month. He is due for PSA today. Follow up in 3 months. Key Oncology Meds     Patient is on no Oncologic meds. Key Pain Meds     The patient is on no pain meds. Lab Results   Component Value Date/Time    WBC 3.4 09/21/2020 11:40 AM    ABS. NEUTROPHILS 1.5 09/21/2020 11:40 AM    HGB 14.2 09/21/2020 11:40 AM    HCT 44.0 09/21/2020 11:40 AM    PLATELET 486 02/36/8871 11:40 AM    Creatinine 0.82 09/21/2020 11:40 AM    BUN 18 09/21/2020 11:40 AM    Prostate Specific Ag <0.1 09/21/2020 11:40 AM       Orders:  -     PSA, DIAGNOSTIC (PROSTATE SPECIFIC AG); Future    2. Urinary incontinence, unspecified type    3. Impotence    4. Autism    5. Kidney stone  Assessment & Plan:  Tiny stone, not an issue. Other orders  -     URINALYSIS W/MICROSCOPIC         Follow-up and Dispositions    · Return in about 3 months (around 2/24/2021) for PSA prior.          Randy Arora MD

## 2020-11-24 ENCOUNTER — OFFICE VISIT (OUTPATIENT)
Dept: UROLOGY | Age: 65
End: 2020-11-24
Payer: MEDICAID

## 2020-11-24 VITALS — WEIGHT: 150 LBS | HEIGHT: 72 IN | TEMPERATURE: 98.2 F | BODY MASS INDEX: 20.32 KG/M2

## 2020-11-24 DIAGNOSIS — F84.0 AUTISM: ICD-10-CM

## 2020-11-24 DIAGNOSIS — N52.9 IMPOTENCE: ICD-10-CM

## 2020-11-24 DIAGNOSIS — N20.0 KIDNEY STONE: ICD-10-CM

## 2020-11-24 DIAGNOSIS — R32 URINARY INCONTINENCE, UNSPECIFIED TYPE: ICD-10-CM

## 2020-11-24 DIAGNOSIS — C61 MALIGNANT NEOPLASM OF PROSTATE (HCC): Primary | ICD-10-CM

## 2020-11-24 LAB
BILIRUB UR QL STRIP: NEGATIVE
GLUCOSE UR-MCNC: NEGATIVE MG/DL
KETONES P FAST UR STRIP-MCNC: NEGATIVE MG/DL
PH UR STRIP: 5.5 [PH] (ref 4.6–8)
PROT UR QL STRIP: NEGATIVE
SP GR UR STRIP: 1.02 (ref 1–1.03)
UA UROBILINOGEN AMB POC: NORMAL (ref 0.2–1)
URINALYSIS CLARITY POC: CLEAR
URINALYSIS COLOR POC: YELLOW
URINE BLOOD POC: NORMAL
URINE LEUKOCYTES POC: NEGATIVE
URINE NITRITES POC: NEGATIVE

## 2020-11-24 PROCEDURE — 81003 URINALYSIS AUTO W/O SCOPE: CPT | Performed by: UROLOGY

## 2020-11-24 PROCEDURE — 99213 OFFICE O/P EST LOW 20 MIN: CPT | Performed by: UROLOGY

## 2020-11-24 NOTE — ASSESSMENT & PLAN NOTE
He is s/p prostatectomy on 8/10/2020. Bladder neck margin was positive. PT3aN0. Derrick's 4+4 disease. PSA was undetectable at 1 month. He is due for PSA today. Follow up in 3 months. Key Oncology Meds     Patient is on no Oncologic meds. Key Pain Meds     The patient is on no pain meds. Lab Results   Component Value Date/Time    WBC 3.4 09/21/2020 11:40 AM    ABS.  NEUTROPHILS 1.5 09/21/2020 11:40 AM    HGB 14.2 09/21/2020 11:40 AM    HCT 44.0 09/21/2020 11:40 AM    PLATELET 948 34/45/3692 11:40 AM    Creatinine 0.82 09/21/2020 11:40 AM    BUN 18 09/21/2020 11:40 AM    Prostate Specific Ag <0.1 09/21/2020 11:40 AM

## 2020-11-25 LAB
APPEARANCE UR: CLEAR
BACTERIA #/AREA URNS HPF: NORMAL /[HPF]
BILIRUB UR QL STRIP: NEGATIVE
CASTS URNS QL MICRO: NORMAL /LPF
COLOR UR: YELLOW
EPI CELLS #/AREA URNS HPF: NORMAL /HPF (ref 0–10)
GLUCOSE UR QL: NEGATIVE
HGB UR QL STRIP: NEGATIVE
KETONES UR QL STRIP: NEGATIVE
LEUKOCYTE ESTERASE UR QL STRIP: NEGATIVE
MICRO URNS: NORMAL
MICRO URNS: NORMAL
MUCOUS THREADS URNS QL MICRO: PRESENT
NITRITE UR QL STRIP: NEGATIVE
PH UR STRIP: 5.5 [PH] (ref 5–7.5)
PROT UR QL STRIP: NEGATIVE
PSA SERPL-MCNC: <0.1 NG/ML (ref 0–4)
RBC #/AREA URNS HPF: NORMAL /HPF (ref 0–2)
SP GR UR: 1.02 (ref 1–1.03)
UROBILINOGEN UR STRIP-MCNC: 0.2 MG/DL (ref 0.2–1)
WBC #/AREA URNS HPF: NORMAL /HPF (ref 0–5)

## 2021-02-11 RX ORDER — METFORMIN HYDROCHLORIDE 500 MG/1
TABLET ORAL
Qty: 270 TAB | Refills: 0 | Status: SHIPPED | OUTPATIENT
Start: 2021-02-11 | End: 2021-11-08

## 2021-02-23 ENCOUNTER — OFFICE VISIT (OUTPATIENT)
Dept: PODIATRY | Age: 66
End: 2021-02-23
Payer: MEDICAID

## 2021-02-23 VITALS
SYSTOLIC BLOOD PRESSURE: 135 MMHG | WEIGHT: 162 LBS | DIASTOLIC BLOOD PRESSURE: 85 MMHG | OXYGEN SATURATION: 99 % | TEMPERATURE: 96.2 F | HEART RATE: 69 BPM | HEIGHT: 72 IN | BODY MASS INDEX: 21.94 KG/M2

## 2021-02-23 DIAGNOSIS — L85.3 XEROSIS CUTIS: Primary | ICD-10-CM

## 2021-02-23 DIAGNOSIS — E11.42 DIABETIC POLYNEUROPATHY ASSOCIATED WITH TYPE 2 DIABETES MELLITUS (HCC): ICD-10-CM

## 2021-02-23 PROCEDURE — 99213 OFFICE O/P EST LOW 20 MIN: CPT | Performed by: PODIATRIST

## 2021-02-23 RX ORDER — GABAPENTIN 300 MG/1
300 CAPSULE ORAL 3 TIMES DAILY
Qty: 90 CAP | Refills: 2 | Status: SHIPPED | OUTPATIENT
Start: 2021-02-23 | End: 2021-11-08

## 2021-02-23 NOTE — PROGRESS NOTES
[facility]    Subjective:         Patient is a 60 y.o. male who is being seen as a returning pt for diabetic lower extremity neuropathy. He states he is initiated and continued with the gabapentin 300 mg twice daily that was prescribed last office visit. He admits to mild relief of his symptoms. He continues to complain of burning/tingling that is worse in the evenings. He continues to deny any recent trauma. He continues to deny any local/systemic signs infection. He continues to deny any other pedal complaints    Past Medical History:   Diagnosis Date    Arthritis     Diabetes ([facility] [address]     Elevated prostate specific antigen (PSA) 6/25/2020    He is referred from Dr. Jose Felipe for elevated PSA. 5/14/2020: 36.8 with 4.3% free. 2/10/2020: 36.3 1/2/2020: 29.5 Right prostate firm and enlarged. 06- visit He has a markedly elevated PSA. He underwent biopsy today, 6/5/20. He is instructed to take his antibiotics as prescribed. He was given post procedure instructions.  Unspecified symptoms and signs involving the genitourinary system 6/25/2020    Elevated PSA with enlarged, firm prostate on exam on 5/14/2020. 06- visit Right prostate enlarged and firm, concerning in light of the elevated PSA. Past Surgical History:   Procedure Laterality Date    HX OTHER SURGICAL      PROCEDURE ON EAR     HX OTHER SURGICAL      PROCEDURE ON EAR     HX PROSTATE SURGERY  08/10/2020     pelvic lymph node dissection    HX PROSTATECTOMY  08/10/2020    [place] PROSTATE BIOPSY, NEEDLE, SATURATION SAMPLING  06/05/2020       Family History   Problem Relation Age of Onset    Diabetes Mother     Cancer Father       Social History     Tobacco Use    Smoking status: Never Smoker    Smokeless tobacco: Never Used   Substance Use Topics    Alcohol use: Not Currently     No Known Allergies  Prior to Admission medications    Medication Sig Start Date End Date Taking?  Authorizing Provider   metFORMIN (GLUCOPHAGE) 500 mg tablet TAKE ONE TABLET BY MOUTH THREE TIMES DAILY WITH MEALS. 2/11/21  Yes Hero Almonte MD   ofloxacin (FLOXIN) 0.3 % ophthalmic solution Administer 3 Drops to both eyes four (4) times daily. Provider, Historical   ketorolac (ACULAR) 0.5 % ophthalmic solution Administer 1 Drop to both eyes four (4) times daily. Provider, Historical   gabapentin (NEURONTIN) 300 mg capsule Take 1 Cap by mouth two (2) times a day. Max Daily Amount: 600 mg. 11/3/20   Olamide Garcia DPM   aspirin delayed-release 81 mg tablet Take  by mouth daily. Provider, Historical       Review of Systems   Constitutional: Negative. HENT: Negative. Eyes: Negative. Respiratory: Negative. Cardiovascular: Negative. Endocrine: Negative. Musculoskeletal: Negative. Allergic/Immunologic: Positive for immunocompromised state. Neurological: Positive for numbness. Hematological: Negative. Psychiatric/Behavioral: Negative. All other systems reviewed and are negative. Objective:     Visit Vitals  /85 (BP 1 Location: Left upper arm, BP Patient Position: Sitting, BP Cuff Size: Adult)   Pulse 69   Temp (!) 96.2 °F (35.7 °C) (Temporal)   Ht 6' (1.829 m)   Wt 162 lb (73.5 kg)   SpO2 99%   BMI 21.97 kg/m²       Physical Exam  Vitals signs reviewed. Constitutional:       Appearance: He is normal weight. Cardiovascular:      Pulses:           Dorsalis pedis pulses are 2+ on the right side and 2+ on the left side. Posterior tibial pulses are 2+ on the right side and 2+ on the left side. Pulmonary:      Effort: Pulmonary effort is normal.   Musculoskeletal:      Right foot: Normal range of motion. No deformity or bunion. Left foot: Normal range of motion. No deformity or bunion. Feet:      Right foot:      Protective Sensation: 10 sites tested. 10 sites sensed. Skin integrity: Dry skin present. Toenail Condition: Right toenails are abnormally thick.       Left foot: Protective Sensation: 10 sites tested. 10 sites sensed. Skin integrity: Dry skin present. Toenail Condition: Left toenails are abnormally thick. Lymphadenopathy:      Lower Body: No right inguinal adenopathy. No left inguinal adenopathy. Skin:     General: Skin is warm. Capillary Refill: Capillary refill takes 2 to 3 seconds. Neurological:      Mental Status: He is alert and oriented to person, place, and time. Psychiatric:         Mood and Affect: Mood and affect normal.         Behavior: Behavior is cooperative. Data Review: No results found for this or any previous visit (from the past 24 hour(s)). Impression:     DM, Type 2  Diabetic peripheral neuropathy  Xerosis cutis    Recommendation:     Patient seen and evaluated in the office  Discussed and educated patient regarding their current medical condition. Instructed patient to monitor their feet daily, be compliant with all medications and wear supportive shoe gear. In-depth conversation had regarding medication management for his diabetic peripheral neuropathy. Increase patient's prescription to 300 mg of the gabapentin 3 times daily. Instructed patient to monitor his symptoms and dosing changes may be needed in the future.   Patient verbalized understanding  Patient to continue with the ammonium lactate 12% lotion to be applied twice daily to affected dry skin

## 2021-02-24 DIAGNOSIS — C61 MALIGNANT NEOPLASM OF PROSTATE (HCC): ICD-10-CM

## 2021-02-28 PROBLEM — L60.3 ONYCHODYSTROPHY: Status: ACTIVE | Noted: 2020-11-03

## 2021-04-07 ENCOUNTER — TELEPHONE (OUTPATIENT)
Dept: UROLOGY | Age: 66
End: 2021-04-07

## 2021-04-07 NOTE — TELEPHONE ENCOUNTER
Patient did not have November PSA drawn; we need to have him get PSA done prior to Friday apt if able to.

## 2021-04-09 ENCOUNTER — OFFICE VISIT (OUTPATIENT)
Dept: UROLOGY | Age: 66
End: 2021-04-09
Payer: MEDICAID

## 2021-04-09 VITALS
DIASTOLIC BLOOD PRESSURE: 62 MMHG | OXYGEN SATURATION: 100 % | BODY MASS INDEX: 20.32 KG/M2 | SYSTOLIC BLOOD PRESSURE: 118 MMHG | RESPIRATION RATE: 18 BRPM | WEIGHT: 150 LBS | HEIGHT: 72 IN | HEART RATE: 64 BPM | TEMPERATURE: 97.5 F

## 2021-04-09 DIAGNOSIS — R32 URINARY INCONTINENCE, UNSPECIFIED TYPE: ICD-10-CM

## 2021-04-09 DIAGNOSIS — N52.9 IMPOTENCE: ICD-10-CM

## 2021-04-09 DIAGNOSIS — F84.0 AUTISM: ICD-10-CM

## 2021-04-09 DIAGNOSIS — C61 MALIGNANT NEOPLASM OF PROSTATE (HCC): Primary | ICD-10-CM

## 2021-04-09 LAB
BILIRUB UR QL STRIP: NEGATIVE
GLUCOSE UR-MCNC: NORMAL MG/DL
KETONES P FAST UR STRIP-MCNC: NEGATIVE MG/DL
PH UR STRIP: 5 [PH] (ref 4.6–8)
PROT UR QL STRIP: NEGATIVE
SP GR UR STRIP: 1.02 (ref 1–1.03)
UA UROBILINOGEN AMB POC: NORMAL (ref 0.2–1)
URINALYSIS CLARITY POC: CLEAR
URINALYSIS COLOR POC: YELLOW
URINE BLOOD POC: NORMAL
URINE LEUKOCYTES POC: NEGATIVE
URINE NITRITES POC: NEGATIVE

## 2021-04-09 PROCEDURE — 99214 OFFICE O/P EST MOD 30 MIN: CPT | Performed by: UROLOGY

## 2021-04-09 PROCEDURE — 81003 URINALYSIS AUTO W/O SCOPE: CPT | Performed by: UROLOGY

## 2021-04-09 NOTE — PROGRESS NOTES
HISTORY OF PRESENT ILLNESS  Yovany Walton is a 73 y.o. male. Chief Complaint   Patient presents with    Follow-up    Prostate Cancer    Enuresis     He is s/p prostatectomy and PLND on 8/10/2020. Bladder neck margin positive, Derrick's 4+4, pT3aN0. PSA undetectable 9/22/2020. He did not have his PSA drawn at his November visit. He had it done this morning. He reports great urine control. Chronic Conditions Addressed Today     1. Malignant neoplasm of prostate ([facility] [address] - Primary     Overview      PSA 36.8 with 4.3% free. He is s/p biopsy on 6/5/2020. He has [place]'s 6 disease in 3/12 cores with up to 70% involvment and Derrick's 3+4 disease in 1/12 cores with 80% involvement. He also has 2 additional cores suspicious for but not diagnostic of malignancy. He has unfavorable intermediate to high risk disease. He is s/p prostatectomy and PLND on 8/10/2020. Bladder neck margin positive, Derrick's 4+4, pT3aN0. PSA undetectable 9/22/2020. He did not have his PSA drawn at his November visit. 2. Urinary incontinence     Overview      He is s/p prostatectomy 8/2020. Educated on kegels and the use of protective undergarments. 3. Autism     Overview      Sister reports a mild form of autism. Lives alone, independent. 4. Impotence     Overview      He is s/p prostatectomy 8/2020. Review of Systems   Constitutional: Negative for chills and fever. HENT: Negative for sinus pain and sore throat. Respiratory: Negative for cough, sputum production and shortness of breath. Gastrointestinal: Negative for diarrhea, nausea and vomiting. Musculoskeletal: Negative for myalgias. Neurological: Negative for headaches. Physical Exam  Constitutional:       General: He is not in acute distress. Appearance: Normal appearance. He is not ill-appearing. Psychiatric:      Comments: He gets excited and upset when he hears something unexpected.   He calms down quickly. ASSESSMENT and PLAN  Diagnoses and all orders for this visit:    1. Malignant neoplasm of prostate [facility])  Assessment & Plan:  He has high risk prostate cancer status post prostatectomy 8/10/2020. He is a bit overdue for PSA test.  He had drawn this and the results are pending. Decipher testing may help categorize his risk and the need for adjuvant therapy. Orders:  -     PSA, DIAGNOSTIC (PROSTATE SPECIFIC AG); Future    2. Urinary incontinence, unspecified type  Assessment & Plan:  He has no further leakage. Orders:  -     PSA, DIAGNOSTIC (PROSTATE SPECIFIC AG); Future    3. Impotence  Assessment & Plan:  He is not in a relationship. He does not think he is having tumescence. He is advised not to respond to ads for ED on TV. If he wants to pursue therapy we can discuss it further. Orders:  -     PSA, DIAGNOSTIC (PROSTATE SPECIFIC AG); Future    4. Autism  Assessment & Plan:  He lives independently. He is not working. He is retired. Orders:  -     PSA, DIAGNOSTIC (PROSTATE SPECIFIC AG); Future         Follow-up and Dispositions    · Return in about 3 months (around 7/9/2021) for PSA prior.          Myrna Badillo MD

## 2021-04-09 NOTE — ASSESSMENT & PLAN NOTE
He has high risk prostate cancer status post prostatectomy 8/10/2020. He is a bit overdue for PSA test.  He had drawn this and the results are pending. Decipher testing may help categorize his risk and the need for adjuvant therapy.

## 2021-04-09 NOTE — PROGRESS NOTES
1. Have you been to the ER, urgent care clinic since your last visit? Hospitalized since your last visit? No    2. Have you seen or consulted any other health care providers outside of the [address] [place] [address] since your last visit? Include any pap smears or colon screening.  No  Chief Complaint   Patient presents with    Follow-up    Prostate Cancer    Enuresis     Visit Vitals  /62 (BP 1 Location: Right arm, BP Patient Position: Sitting, BP Cuff Size: Adult)   Pulse 64   Temp 97.5 °F (36.4 °C) (Temporal)   Resp 18   Ht 6' (1.829 m)   Wt 150 lb (68 kg)   SpO2 100%   BMI 20.34 kg/m²

## 2021-04-09 NOTE — LETTER
4/9/2021 Patient: Mariel Garvin. YOB: 1955 Date of Visit: 4/9/2021 Radha Fischer MD 
[facility] [id number] [address] [employer] Dear Radha Fischer MD, Thank you for referring Mr. Lou Marmolejo to [place] [address] for evaluation. My notes for this consultation are attached. If you have questions, please do not hesitate to call me. I look forward to following your patient along with you.  
 
 
Sincerely, 
 
Gio Webb MD

## 2021-04-09 NOTE — ASSESSMENT & PLAN NOTE
He is not in a relationship. He does not think he is having tumescence. He is advised not to respond to ads for ED on TV. If he wants to pursue therapy we can discuss it further.

## 2021-04-10 LAB — PSA SERPL-MCNC: <0.1 NG/ML (ref 0–4)

## 2021-05-19 ENCOUNTER — TELEPHONE (OUTPATIENT)
Dept: UROLOGY | Age: 66
End: 2021-05-19

## 2021-06-28 ENCOUNTER — TELEPHONE (OUTPATIENT)
Dept: UROLOGY | Age: 66
End: 2021-06-28

## 2021-06-28 DIAGNOSIS — C61 MALIGNANT NEOPLASM OF PROSTATE (HCC): Primary | ICD-10-CM

## 2021-06-30 NOTE — TELEPHONE ENCOUNTER
Patient called to let us know that he will get his lab work done before his appt on 7/9.  He said once it is done he will tell [facility] to call the office to let us know as well

## 2021-07-02 LAB — PSA SERPL-MCNC: <0.1 NG/ML (ref 0–4)

## 2021-07-07 ENCOUNTER — OFFICE VISIT (OUTPATIENT)
Dept: PODIATRY | Age: 66
End: 2021-07-07
Payer: MEDICAID

## 2021-07-07 VITALS
HEIGHT: 72 IN | HEART RATE: 79 BPM | SYSTOLIC BLOOD PRESSURE: 115 MMHG | TEMPERATURE: 97.5 F | WEIGHT: 169.4 LBS | BODY MASS INDEX: 22.94 KG/M2 | DIASTOLIC BLOOD PRESSURE: 74 MMHG

## 2021-07-07 DIAGNOSIS — L85.3 XEROSIS CUTIS: ICD-10-CM

## 2021-07-07 DIAGNOSIS — E11.42 TYPE 2 DIABETES MELLITUS WITH DIABETIC POLYNEUROPATHY, WITHOUT LONG-TERM CURRENT USE OF INSULIN (HCC): Primary | ICD-10-CM

## 2021-07-07 DIAGNOSIS — L60.3 ONYCHODYSTROPHY: ICD-10-CM

## 2021-07-07 PROCEDURE — 99213 OFFICE O/P EST LOW 20 MIN: CPT | Performed by: PODIATRIST

## 2021-07-07 PROCEDURE — 11721 DEBRIDE NAIL 6 OR MORE: CPT | Performed by: PODIATRIST

## 2021-07-07 NOTE — PROGRESS NOTES
1. Have you been to the ER, urgent care clinic since your last visit? Hospitalized since your last visit? No    2. Have you seen or consulted any other health care providers outside of the [address] [place] [address] since your last visit? Include any pap smears or colon screening.  No     Chief Complaint   Patient presents with    Diabetic Foot Exam     last pcp visit 09/20/2020    Numbness     f/u neuropathy

## 2021-07-09 ENCOUNTER — OFFICE VISIT (OUTPATIENT)
Dept: UROLOGY | Age: 66
End: 2021-07-09
Payer: MEDICAID

## 2021-07-09 VITALS
RESPIRATION RATE: 22 BRPM | BODY MASS INDEX: 22.89 KG/M2 | OXYGEN SATURATION: 98 % | HEIGHT: 72 IN | WEIGHT: 169 LBS | SYSTOLIC BLOOD PRESSURE: 128 MMHG | TEMPERATURE: 97.6 F | DIASTOLIC BLOOD PRESSURE: 92 MMHG | HEART RATE: 106 BPM

## 2021-07-09 DIAGNOSIS — R32 URINARY INCONTINENCE, UNSPECIFIED TYPE: ICD-10-CM

## 2021-07-09 DIAGNOSIS — N28.1 RENAL CYST: ICD-10-CM

## 2021-07-09 DIAGNOSIS — F84.0 AUTISM: ICD-10-CM

## 2021-07-09 DIAGNOSIS — N52.9 IMPOTENCE: ICD-10-CM

## 2021-07-09 DIAGNOSIS — C61 MALIGNANT NEOPLASM OF PROSTATE (HCC): ICD-10-CM

## 2021-07-09 DIAGNOSIS — C61 MALIGNANT NEOPLASM OF PROSTATE (HCC): Primary | ICD-10-CM

## 2021-07-09 LAB
BILIRUB UR QL STRIP: NEGATIVE
GLUCOSE UR-MCNC: NORMAL MG/DL
KETONES P FAST UR STRIP-MCNC: NEGATIVE MG/DL
PH UR STRIP: 5 [PH] (ref 4.6–8)
PROT UR QL STRIP: NEGATIVE
SP GR UR STRIP: 1.01 (ref 1–1.03)
UA UROBILINOGEN AMB POC: NORMAL (ref 0.2–1)
URINALYSIS CLARITY POC: CLEAR
URINALYSIS COLOR POC: YELLOW
URINE BLOOD POC: NEGATIVE
URINE LEUKOCYTES POC: NEGATIVE
URINE NITRITES POC: NEGATIVE

## 2021-07-09 PROCEDURE — 99214 OFFICE O/P EST MOD 30 MIN: CPT | Performed by: UROLOGY

## 2021-07-09 PROCEDURE — 81003 URINALYSIS AUTO W/O SCOPE: CPT | Performed by: UROLOGY

## 2021-07-09 NOTE — LETTER
7/9/2021    Patient: Jessica Miranda. YOB: 1955   Date of Visit: 7/9/2021     Gillian Higuera MD  [address] [place] [address]  [address] [facility] [address]    Dear Gillian Higuera MD,      Thank you for referring Mr. Redd King to [place] [address] for evaluation. My notes for this consultation are attached. If you have questions, please do not hesitate to call me. I look forward to following your patient along with you.       Sincerely,    Layton Rubinstein, MD

## 2021-07-09 NOTE — PROGRESS NOTES
HISTORY OF PRESENT ILLNESS  Seth Roper is a 73 y.o. male. Chief Complaint   Patient presents with    Follow-up    Prostate Cancer    Renal Cyst    Enuresis     He is s/p prostatectomy and PLND on 8/10/2020. Bladder neck margin positive, [place]'s 4+4, pT3aN0. PSA undetectable 9/22/2020. He did not have his PSA drawn at his November visit. 4/9/21: undetectable  7/1/21: undetectable. Urine control has been good. Chronic Conditions Addressed Today     1. Malignant neoplasm of prostate ([address] - Primary     Overview      Pretreatment PSA 36.8 with 4.3% free. He is s/p biopsy on 6/5/2020. He has Derrick's 6 disease in 3/12 cores with up to 70% involvment and Derrick's 3+4 disease in 1/12 cores with 80% involvement. He is s/p prostatectomy and PLND on 8/10/2020. Bladder neck margin positive, [place]'s 4+4, pT3aN0. PSA undetectable 9/22/2020. He did not have his PSA drawn at his November visit. 4/9/21: undetectable  7/1/21: undetectable. 2. Urinary incontinence     Overview      He is s/p prostatectomy 8/2020. Educated on kegels and the use of protective undergarments. 4/9/21: patient reports no ongoing leakage. 3. Renal cyst     Overview      CT scan 6/30/2020 with bilateral renal cysts, largest on the left at 4.9 cm.           4. Impotence     Overview      He is s/p prostatectomy 8/2020.    4/9/21: he is not in a relationship but notes no tumescence. He is advised not to respond to ads on TV in regards to erectile function. Caron Perera MD.               Patient denies the symptoms of COVID-19 per routine screening guidelines. ROS    Past Medical History:  PMHx (including negatives):  has a past medical history of Arthritis, Cancer ([address] Diabetes ([address] Elevated prostate specific antigen (PSA) (6/25/2020), and Unspecified symptoms and signs involving the genitourinary system (6/25/2020).    PSurgHx:  has a past surgical history that includes hx other surgical; hx other surgical; pr prostate biopsy, needle, saturation sampling (06/05/2020); hx prostatectomy (08/10/2020); and hx prostate surgery (08/10/2020). PSocHx:  reports that he has never smoked. He has never used smokeless tobacco. He reports previous alcohol use. He reports that he does not use drugs. Physical Exam    ASSESSMENT and PLAN  Diagnoses and all orders for this visit:    1. Malignant neoplasm of prostate [facility])  Assessment & Plan:  He has high risk prostate cancer status post prostatectomy 8/10/2020. PSA has been undetectable. Orders:  -     PSA, DIAGNOSTIC (PROSTATE SPECIFIC AG); Future    2. Impotence  Assessment & Plan:   He is able to function. He is not in a relationship      3. Urinary incontinence, unspecified type  Assessment & Plan:  Stable. No ongoing leakage. 4. Renal cyst         Follow-up and Dispositions    · Return in about 3 months (around 10/9/2021) for PSA prior.          Briana Espinosa MD

## 2021-07-09 NOTE — PROGRESS NOTES
1. Have you been to the ER, urgent care clinic since your last visit? Hospitalized since your last visit? No    2. Have you seen or consulted any other health care providers outside of the [address] [place] [address] since your last visit? Include any pap smears or colon screening.  NO  Chief Complaint   Patient presents with    Follow-up    Prostate Cancer    Renal Cyst    Enuresis     Visit Vitals  BP (!) 128/92 (BP 1 Location: Right arm, BP Patient Position: Sitting, BP Cuff Size: Adult)   Pulse (!) 106   Temp 97.6 °F (36.4 °C) (Temporal)   Resp 22   Ht 6' (1.829 m)   Wt 169 lb (76.7 kg)   SpO2 98%   BMI 22.92 kg/m²

## 2021-07-14 PROBLEM — E11.9 TYPE 2 DIABETES MELLITUS WITHOUT COMPLICATION, WITHOUT LONG-TERM CURRENT USE OF INSULIN (HCC): Status: RESOLVED | Noted: 2020-11-03 | Resolved: 2021-07-14

## 2021-07-14 PROBLEM — E11.42 DIABETIC POLYNEUROPATHY ASSOCIATED WITH TYPE 2 DIABETES MELLITUS (HCC): Status: RESOLVED | Noted: 2020-11-03 | Resolved: 2021-07-14

## 2021-07-14 PROBLEM — E11.42 TYPE 2 DIABETES MELLITUS WITH DIABETIC POLYNEUROPATHY, WITHOUT LONG-TERM CURRENT USE OF INSULIN (HCC): Status: ACTIVE | Noted: 2021-07-14

## 2021-07-14 NOTE — PROGRESS NOTES
[facility]    Subjective:         Patient is a 73 y.o. male who is being seen as a returning pt for diabetic lower extremity neuropathy and in need of a diabetic pedal exam.  He states he has continued with the gabapentin 300 mg 3x daily that was prescribed last office visit. He admits to relief of his symptoms. He states he last saw his PCP (Dr. Jennifer Ramírez). He states he last saw his PCP on 09/21/2021. He states his last A1c was 7.0. He states the lotion (ammonium lactate 12%) has helped with his dry skin. He continues to deny any recent trauma. He continues to deny any local/systemic signs infection. He continues to deny any other pedal complaints    Past Medical History:   Diagnosis Date    Arthritis     Cancer ([facility]     Diabetes ([facility]     Elevated prostate specific antigen (PSA) 6/25/2020    He is referred from Dr. Nimo Mccray for elevated PSA. 5/14/2020: 36.8 with 4.3% free. 2/10/2020: 36.3 1/2/2020: 29.5 Right prostate firm and enlarged. 06- visit He has a markedly elevated PSA. He underwent biopsy today, 6/5/20. He is instructed to take his antibiotics as prescribed. He was given post procedure instructions.  Unspecified symptoms and signs involving the genitourinary system 6/25/2020    Elevated PSA with enlarged, firm prostate on exam on 5/14/2020. 06- visit Right prostate enlarged and firm, concerning in light of the elevated PSA.      Past Surgical History:   Procedure Laterality Date    HX OTHER SURGICAL      PROCEDURE ON EAR     HX OTHER SURGICAL      PROCEDURE ON EAR     HX PROSTATE SURGERY  08/10/2020     pelvic lymph node dissection    HX PROSTATECTOMY  08/10/2020    HI PROSTATE BIOPSY, NEEDLE, SATURATION SAMPLING  06/05/2020       Family History   Problem Relation Age of Onset    Diabetes Mother     Cancer Father       Social History     Tobacco Use    Smoking status: Never Smoker    Smokeless tobacco: Never Used   Substance Use Topics    Alcohol use: Not Currently     No Known Allergies  Prior to Admission medications    Medication Sig Start Date End Date Taking? Authorizing Provider   gabapentin (NEURONTIN) 300 mg capsule Take 1 Cap by mouth three (3) times daily. Max Daily Amount: 900 mg. 2/23/21  Yes Heike Gamez DPM   metFORMIN (GLUCOPHAGE) 500 mg tablet TAKE ONE TABLET BY MOUTH THREE TIMES DAILY WITH MEALS. 2/11/21  Yes Elena Perez MD   ofloxacin (FLOXIN) 0.3 % ophthalmic solution Administer 3 Drops to both eyes four (4) times daily. Yes Provider, Historical   ketorolac (ACULAR) 0.5 % ophthalmic solution Administer 1 Drop to both eyes four (4) times daily. Yes Provider, Historical   aspirin delayed-release 81 mg tablet Take  by mouth daily. Yes Provider, Historical       Review of Systems   Constitutional: Negative. HENT: Negative. Eyes: Negative. Respiratory: Negative. Cardiovascular: Negative. Endocrine: Negative. Musculoskeletal: Negative. Allergic/Immunologic: Positive for immunocompromised state. Neurological: Positive for numbness. Hematological: Negative. Psychiatric/Behavioral: Negative. All other systems reviewed and are negative. Objective:     Visit Vitals  /74 (BP 1 Location: Right upper arm, BP Patient Position: Sitting, BP Cuff Size: Adult)   Pulse 79   Temp 97.5 °F (36.4 °C) (Temporal)   Ht 6' (1.829 m)   Wt 169 lb 6.4 oz (76.8 kg)   BMI 22.97 kg/m²       Physical Exam  Vitals reviewed. Constitutional:       Appearance: He is normal weight. Cardiovascular:      Pulses:           Dorsalis pedis pulses are 2+ on the right side and 2+ on the left side. Posterior tibial pulses are 2+ on the right side and 2+ on the left side. Pulmonary:      Effort: Pulmonary effort is normal.   Musculoskeletal:      Right foot: Normal range of motion. No deformity or bunion. Left foot: Normal range of motion. No deformity or bunion.    Feet:      Right foot:      Protective Sensation: 10 sites tested. 10 sites sensed. Skin integrity: Dry skin present. Toenail Condition: Right toenails are abnormally thick. Left foot:      Protective Sensation: 10 sites tested. 10 sites sensed. Skin integrity: Dry skin present. Toenail Condition: Left toenails are abnormally thick. Lymphadenopathy:      Lower Body: No right inguinal adenopathy. No left inguinal adenopathy. Skin:     General: Skin is warm. Capillary Refill: Capillary refill takes 2 to 3 seconds. Comments: Absent pedal hair growth with hyperpigmentation to the skin   Neurological:      Mental Status: He is alert and oriented to person, place, and time. Comments: Paresthesias/burning noted to B/L LE   Psychiatric:         Mood and Affect: Mood and affect normal.         Behavior: Behavior is cooperative. Data Review: No results found for this or any previous visit (from the past 24 hour(s)). Impression:       ICD-10-CM ICD-9-CM    1. Type 2 diabetes mellitus with diabetic polyneuropathy, without long-term current use of insulin ([facility])  E11.42 250.60      357.2    2. Onychodystrophy  L60.3 703.8    3. Xerosis cutis  L85.3 706.8        Recommendation:     Patient seen and evaluated in the office  Discussed and educated patient regarding their current medical condition. Instructed patient to monitor their feet daily, be compliant with all medications and wear supportive shoe gear. A sharp toenail plate was performed to all 10 pedal digits with a nail nipper without incident. She tolerated well her dressing was needed  In-depth conversation had regarding medication management for his diabetic peripheral neuropathy. Patient to continue with the 300 mg of the gabapentin 3 times daily. Instructed patient to monitor his symptoms and dosing changes may be needed in the future.   Patient verbalized understanding  Patient to continue with the ammonium lactate 12% lotion to be applied twice daily to affected dry skin

## 2021-10-01 ENCOUNTER — TELEPHONE (OUTPATIENT)
Dept: UROLOGY | Age: 66
End: 2021-10-01

## 2021-10-01 NOTE — TELEPHONE ENCOUNTER
Pt called to see if there was an active order at [facility] for him to have his blood work done and I did check and there was an active order in for him

## 2021-10-02 LAB — PSA SERPL-MCNC: <0.1 NG/ML (ref 0–4)

## 2021-10-09 DIAGNOSIS — C61 MALIGNANT NEOPLASM OF PROSTATE (HCC): ICD-10-CM

## 2021-10-25 ENCOUNTER — TELEPHONE (OUTPATIENT)
Dept: UROLOGY | Age: 66
End: 2021-10-25

## 2021-11-08 DIAGNOSIS — E11.42 DIABETIC POLYNEUROPATHY ASSOCIATED WITH TYPE 2 DIABETES MELLITUS (HCC): ICD-10-CM

## 2021-11-08 RX ORDER — GABAPENTIN 300 MG/1
CAPSULE ORAL
Qty: 90 CAPSULE | Refills: 0 | Status: SHIPPED | OUTPATIENT
Start: 2021-11-08 | End: 2021-12-16 | Stop reason: ALTCHOICE

## 2021-11-08 RX ORDER — METFORMIN HYDROCHLORIDE 500 MG/1
TABLET ORAL
Qty: 270 TABLET | Refills: 0 | Status: SHIPPED | OUTPATIENT
Start: 2021-11-08 | End: 2021-12-16 | Stop reason: DRUGHIGH

## 2021-11-12 ENCOUNTER — OFFICE VISIT (OUTPATIENT)
Dept: UROLOGY | Age: 66
End: 2021-11-12
Payer: MEDICAID

## 2021-11-12 VITALS
TEMPERATURE: 97.1 F | OXYGEN SATURATION: 98 % | RESPIRATION RATE: 22 BRPM | HEART RATE: 66 BPM | BODY MASS INDEX: 21.67 KG/M2 | DIASTOLIC BLOOD PRESSURE: 79 MMHG | HEIGHT: 72 IN | SYSTOLIC BLOOD PRESSURE: 111 MMHG | WEIGHT: 160 LBS

## 2021-11-12 DIAGNOSIS — C61 MALIGNANT NEOPLASM OF PROSTATE (HCC): Primary | ICD-10-CM

## 2021-11-12 DIAGNOSIS — N52.9 IMPOTENCE: ICD-10-CM

## 2021-11-12 DIAGNOSIS — R32 URINARY INCONTINENCE, UNSPECIFIED TYPE: ICD-10-CM

## 2021-11-12 PROCEDURE — 99213 OFFICE O/P EST LOW 20 MIN: CPT | Performed by: UROLOGY

## 2021-11-12 NOTE — LETTER
11/12/2021    Patient: Anuj Alvarez. YOB: 1955   Date of Visit: 11/12/2021     Isatu Candelaria MD  [address] [place] [address]  [facility] [facility] [address]    Dear Isatu Candelaria MD,      Thank you for referring Mr. Solis Pierce to [place] [address] for evaluation. My notes for this consultation are attached. If you have questions, please do not hesitate to call me. I look forward to following your patient along with you.       Sincerely,    Avi Garibay MD

## 2021-11-12 NOTE — ASSESSMENT & PLAN NOTE
He is over 1 year status post a prostatectomy. He had high risk disease. PSA has been undetectable, last drawn on 10/1/2021. Continue monitoring.

## 2021-11-12 NOTE — PROGRESS NOTES
HISTORY OF PRESENT ILLNESS  Lissette Canales is a 73 y.o. male. Chief Complaint   Patient presents with    Follow-up    Prostate Cancer    Enuresis     He is s/p prostatectomy and PLND on 8/10/2020. Bladder neck margin positive, [place]'s 4+4, pT3aN0. PSA has been undetectable, last drawn on 10/1/21. Chronic Conditions Addressed Today     1. Malignant neoplasm of prostate ([facility] - Primary     Overview      Pretreatment PSA 36.8 with 4.3% free. He is s/p biopsy on 6/5/2020. He has Derrick's 6 disease in 3/12 cores with up to 70% involvment and [place]'s 3+4 disease in 1/12 cores with 80% involvement. He is s/p prostatectomy and PLND on 8/10/2020. Bladder neck margin positive, [place]'s 4+4, pT3aN0. PSA undetectable 9/22/2020. He did not have his PSA drawn at his November visit. 4/9/21: undetectable  7/1/21: undetectable. 10/1/21: undetectable. 2. Urinary incontinence     Overview      He is s/p prostatectomy 8/2020. Educated on kegels and the use of protective undergarments. 4/9/21: patient reports no ongoing leakage. 7/9/21: no leakage. 3. Impotence     Overview      He is s/p prostatectomy 8/2020.    4/9/21: he is not in a relationship but notes no tumescence. He is advised not to respond to ads on TV in regards to erectile function. John Tavarez MD.    7/9/21: able to function, not in a relationship. Patient denies the symptoms of COVID-19 per routine screening guidelines. ROS    Past Medical History:  PMHx (including negatives):  has a past medical history of Arthritis, Cancer ([facility] Diabetes ([facility] Elevated prostate specific antigen (PSA) (6/25/2020), and Unspecified symptoms and signs involving the genitourinary system (6/25/2020).    PSurgHx:  has a past surgical history that includes hx other surgical; hx other surgical; pr prostate biopsy, needle, saturation sampling (06/05/2020); hx prostatectomy (08/10/2020); hx prostate surgery (08/10/2020); and hx heent (Right). PSocHx:  reports that he has never smoked. He has never used smokeless tobacco. He reports previous alcohol use. He reports that he does not use drugs. Physical Exam    ASSESSMENT and PLAN  Diagnoses and all orders for this visit:    1. Malignant neoplasm of prostate [facility])  Assessment & Plan:  He is over 1 year status post a prostatectomy. He had high risk disease. PSA has been undetectable, last drawn on 10/1/2021. Continue monitoring. Orders:  -     PSA, DIAGNOSTIC (PROSTATE SPECIFIC AG); Future    2. Urinary incontinence, unspecified type  Assessment & Plan:   He has no bothersome leakage. Orders:  -     AMB POC URINALYSIS DIP STICK AUTO W/O MICRO    3. Impotence  Assessment & Plan:   He is not in a relationship currently. Follow-up and Dispositions    · Return in about 3 months (around 2/12/2022).          Avi Garibay MD

## 2021-11-12 NOTE — PROGRESS NOTES
1. Have you been to the ER, urgent care clinic since your last visit? Hospitalized since your last visit? No    2. Have you seen or consulted any other health care providers outside of the [address] [address] since your last visit? Include any pap smears or colon screening.  No  Chief Complaint   Patient presents with    Follow-up    Prostate Cancer    Enuresis     Visit Vitals  /79 (BP 1 Location: Right arm, BP Patient Position: Sitting, BP Cuff Size: Adult)   Pulse 66   Temp 97.1 °F (36.2 °C) (Temporal)   Resp 22   Ht 6' (1.829 m)   Wt 160 lb (72.6 kg)   SpO2 98%   BMI 21.70 kg/m²

## 2021-12-06 ENCOUNTER — APPOINTMENT (OUTPATIENT)
Dept: GENERAL RADIOLOGY | Age: 66
End: 2021-12-06
Attending: EMERGENCY MEDICINE
Payer: MEDICAID

## 2021-12-06 ENCOUNTER — HOSPITAL ENCOUNTER (EMERGENCY)
Age: 66
Discharge: ANOTHER HOSPITAL | End: 2021-12-06
Attending: EMERGENCY MEDICINE
Payer: MEDICAID

## 2021-12-06 ENCOUNTER — APPOINTMENT (OUTPATIENT)
Dept: CT IMAGING | Age: 66
End: 2021-12-06
Attending: EMERGENCY MEDICINE
Payer: MEDICAID

## 2021-12-06 VITALS
RESPIRATION RATE: 22 BRPM | WEIGHT: 160 LBS | SYSTOLIC BLOOD PRESSURE: 118 MMHG | OXYGEN SATURATION: 100 % | BODY MASS INDEX: 21.67 KG/M2 | HEART RATE: 101 BPM | TEMPERATURE: 97.6 F | HEIGHT: 72 IN | DIASTOLIC BLOOD PRESSURE: 92 MMHG
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
## 2021-12-06 DIAGNOSIS — S06.361A TRAUMATIC HEMORRHAGE OF CEREBRUM WITH LOSS OF CONSCIOUSNESS OF 30 MINUTES OR LESS, UNSPECIFIED LATERALITY, INITIAL ENCOUNTER (HCC): Primary | ICD-10-CM

## 2021-12-06 DIAGNOSIS — R73.9 HYPERGLYCEMIA: ICD-10-CM

## 2021-12-06 LAB
ALBUMIN SERPL-MCNC: 4 G/DL (ref 3.5–5)
ALBUMIN/GLOB SERPL: 1 {RATIO} (ref 1.1–2.2)
ALP SERPL-CCNC: 98 U/L (ref 45–117)
ALT SERPL-CCNC: 40 U/L (ref 12–78)
ANION GAP SERPL CALC-SCNC: 8 MMOL/L (ref 5–15)
AST SERPL W P-5'-P-CCNC: 34 U/L (ref 15–37)
ATRIAL RATE: 100 BPM
BASE DEFICIT BLDV-SCNC: 0.9 MMOL/L (ref 0–2)
BASOPHILS # BLD: 0 K/UL (ref 0–0.1)
BASOPHILS NFR BLD: 0 % (ref 0–1)
BDY SITE: ABNORMAL
BILIRUB SERPL-MCNC: 1 MG/DL (ref 0.2–1)
BUN SERPL-MCNC: 21 MG/DL (ref 6–20)
BUN/CREAT SERPL: 16 (ref 12–20)
CA-I BLD-MCNC: 9.9 MG/DL (ref 8.5–10.1)
CALCULATED P AXIS, ECG09: 48 DEGREES
CALCULATED R AXIS, ECG10: 33 DEGREES
CALCULATED T AXIS, ECG11: 22 DEGREES
CHLORIDE SERPL-SCNC: 99 MMOL/L (ref 97–108)
CK SERPL-CCNC: 231.9 NG/ML (ref 39–308)
CO2 SERPL-SCNC: 25 MMOL/L (ref 21–32)
COVID-19 RAPID TEST, COVR: NOT DETECTED
CREAT SERPL-MCNC: 1.33 MG/DL (ref 0.7–1.3)
DIAGNOSIS, 93000: NORMAL
DIFFERENTIAL METHOD BLD: NORMAL
EOSINOPHIL # BLD: 0.1 K/UL (ref 0–0.4)
EOSINOPHIL NFR BLD: 1 % (ref 0–7)
ERYTHROCYTE [DISTWIDTH] IN BLOOD BY AUTOMATED COUNT: 13.2 % (ref 11.5–14.5)
FIO2 ON VENT: 21 %
GLOBULIN SER CALC-MCNC: 4.1 G/DL (ref 2–4)
GLUCOSE BLD STRIP.AUTO-MCNC: 346 MG/DL (ref 65–117)
GLUCOSE SERPL-MCNC: 493 MG/DL (ref 65–100)
HCO3 BLDV-SCNC: 23 MMOL/L (ref 22–26)
HCT VFR BLD AUTO: 44.5 % (ref 36.6–50.3)
HGB BLD-MCNC: 15 G/DL (ref 12.1–17)
IMM GRANULOCYTES # BLD AUTO: 0 K/UL (ref 0–0.04)
IMM GRANULOCYTES NFR BLD AUTO: 0 % (ref 0–0.5)
LACTATE SERPL-SCNC: 4.4 MMOL/L (ref 0.4–2)
LYMPHOCYTES # BLD: 1.6 K/UL (ref 0.8–3.5)
LYMPHOCYTES NFR BLD: 28 % (ref 12–49)
MCH RBC QN AUTO: 29.6 PG (ref 26–34)
MCHC RBC AUTO-ENTMCNC: 33.7 G/DL (ref 30–36.5)
MCV RBC AUTO: 87.9 FL (ref 80–99)
MONOCYTES # BLD: 0.3 K/UL (ref 0–1)
MONOCYTES NFR BLD: 5 % (ref 5–13)
NEUTS SEG # BLD: 3.7 K/UL (ref 1.8–8)
NEUTS SEG NFR BLD: 66 % (ref 32–75)
NRBC # BLD: 0 K/UL (ref 0–0.01)
NRBC BLD-RTO: 0 PER 100 WBC
P-R INTERVAL, ECG05: 166 MS
PCO2 BLDV: 56.7 MMHG (ref 40–50)
PERFORMED BY, TECHID: ABNORMAL
PH BLDV: 7.28 [PH] (ref 7.31–7.41)
PLATELET # BLD AUTO: 167 K/UL (ref 150–400)
PMV BLD AUTO: 10.9 FL (ref 8.9–12.9)
PO2 BLDV: 39 MMHG (ref 36–42)
POTASSIUM SERPL-SCNC: 5.8 MMOL/L (ref 3.5–5.1)
PROT SERPL-MCNC: 8.1 G/DL (ref 6.4–8.2)
Q-T INTERVAL, ECG07: 354 MS
QRS DURATION, ECG06: 100 MS
QTC CALCULATION (BEZET), ECG08: 456 MS
RBC # BLD AUTO: 5.06 M/UL (ref 4.1–5.7)
SAO2 % BLDV: 67 % (ref 60–80)
SAO2% DEVICE SAO2% SENSOR NAME: ABNORMAL
SODIUM SERPL-SCNC: 132 MMOL/L (ref 136–145)
SPECIMEN SOURCE: NORMAL
TROPONIN-HIGH SENSITIVITY: 19 NG/L (ref 0–76)
VENTRICULAR RATE, ECG03: 100 BPM
WBC # BLD AUTO: 5.7 K/UL (ref 4.1–11.1)

## 2021-12-06 PROCEDURE — 83605 ASSAY OF LACTIC ACID: CPT

## 2021-12-06 PROCEDURE — 80053 COMPREHEN METABOLIC PANEL: CPT

## 2021-12-06 PROCEDURE — 36415 COLL VENOUS BLD VENIPUNCTURE: CPT

## 2021-12-06 PROCEDURE — 87040 BLOOD CULTURE FOR BACTERIA: CPT

## 2021-12-06 PROCEDURE — 74011250636 HC RX REV CODE- 250/636: Performed by: EMERGENCY MEDICINE

## 2021-12-06 PROCEDURE — 84484 ASSAY OF TROPONIN QUANT: CPT

## 2021-12-06 PROCEDURE — 72125 CT NECK SPINE W/O DYE: CPT

## 2021-12-06 PROCEDURE — 74011000258 HC RX REV CODE- 258: Performed by: EMERGENCY MEDICINE

## 2021-12-06 PROCEDURE — 82550 ASSAY OF CK (CPK): CPT

## 2021-12-06 PROCEDURE — 82962 GLUCOSE BLOOD TEST: CPT

## 2021-12-06 PROCEDURE — 93005 ELECTROCARDIOGRAM TRACING: CPT

## 2021-12-06 PROCEDURE — 70450 CT HEAD/BRAIN W/O DYE: CPT

## 2021-12-06 PROCEDURE — 87635 SARS-COV-2 COVID-19 AMP PRB: CPT

## 2021-12-06 PROCEDURE — 85025 COMPLETE CBC W/AUTO DIFF WBC: CPT

## 2021-12-06 PROCEDURE — 82803 BLOOD GASES ANY COMBINATION: CPT

## 2021-12-06 PROCEDURE — 96361 HYDRATE IV INFUSION ADD-ON: CPT

## 2021-12-06 PROCEDURE — 96365 THER/PROPH/DIAG IV INF INIT: CPT

## 2021-12-06 PROCEDURE — 99285 EMERGENCY DEPT VISIT HI MDM: CPT

## 2021-12-06 PROCEDURE — 71045 X-RAY EXAM CHEST 1 VIEW: CPT

## 2021-12-06 PROCEDURE — 74011636637 HC RX REV CODE- 636/637: Performed by: EMERGENCY MEDICINE

## 2021-12-06 PROCEDURE — 96375 TX/PRO/DX INJ NEW DRUG ADDON: CPT

## 2021-12-06 RX ORDER — SODIUM CHLORIDE 9 MG/ML
100 INJECTION, SOLUTION INTRAVENOUS CONTINUOUS
Status: DISCONTINUED | OUTPATIENT
Start: 2021-12-06 | End: 2021-12-06 | Stop reason: HOSPADM

## 2021-12-06 RX ADMIN — SODIUM CHLORIDE 1000 ML: 9 INJECTION, SOLUTION INTRAVENOUS at 12:04

## 2021-12-06 RX ADMIN — PIPERACILLIN AND TAZOBACTAM 3.38 G: 3; .375 INJECTION, POWDER, LYOPHILIZED, FOR SOLUTION INTRAVENOUS at 12:10

## 2021-12-06 RX ADMIN — INSULIN HUMAN 7 UNITS: 100 INJECTION, SOLUTION PARENTERAL at 12:10

## 2021-12-06 RX ADMIN — SODIUM CHLORIDE 100 ML/HR: 9 INJECTION, SOLUTION INTRAVENOUS at 12:32

## 2021-12-06 RX ADMIN — SODIUM CHLORIDE 1000 ML: 9 INJECTION, SOLUTION INTRAVENOUS at 12:05

## 2021-12-06 NOTE — ED PROVIDER NOTES
EMERGENCY DEPARTMENT HISTORY AND PHYSICAL EXAM      Date: 12/6/2021  Patient Name: Sharlene Sandoval. History of Presenting Illness     Chief Complaint   Patient presents with    Altered mental status       History Provided By: Patient and EMS    HPI: Sharlene Mann, 73 y.o. male with a past medical history significant diabetes presents to the ED with chief complaint of Altered mental status  . 21-year-old male witnessed ground-level fall prior to arrival.  Sugar was found to be elevated. Was arousable and then becomes altered. Back-and-forth waxing and waning while here. However is a limited historian related to his history          There are no other complaints, changes, or physical findings at this time. PCP: Jennie Ring MD    Current Facility-Administered Medications   Medication Dose Route Frequency Provider Last Rate Last Admin    0.9% sodium chloride infusion  100 mL/hr IntraVENous CONTINUOUS Lamar Faith  mL/hr at 12/06/21 1232 100 mL/hr at 12/06/21 1232     Current Outpatient Medications   Medication Sig Dispense Refill    metFORMIN (GLUCOPHAGE) 500 mg tablet TAKE ONE TABLET BY MOUTH THREE TIMES DAILY WITH MEALS. 270 Tablet 0    gabapentin (NEURONTIN) 300 mg capsule TAKE (1) CAPSULE BY MOUTH THREE TIMES DAILY. 90 Capsule 0    ofloxacin (FLOXIN) 0.3 % ophthalmic solution Administer 3 Drops to both eyes four (4) times daily. (Patient not taking: Reported on 11/12/2021)      ketorolac (ACULAR) 0.5 % ophthalmic solution Administer 1 Drop to both eyes four (4) times daily. (Patient not taking: Reported on 11/12/2021)      aspirin delayed-release 81 mg tablet Take  by mouth daily. Past History     Past Medical History:  Past Medical History:   Diagnosis Date    Arthritis     Cancer ([facility] [address]     Diabetes ([facility] [address]     Elevated prostate specific antigen (PSA) 6/25/2020    He is referred from Dr. Garces Manual for elevated PSA. 5/14/2020: 36.8 with 4.3% free.  2/10/2020: 36.3 1/2/2020: 29.5 Right prostate firm and enlarged. 06- visit He has a markedly elevated PSA. He underwent biopsy today, 6/5/20. He is instructed to take his antibiotics as prescribed. He was given post procedure instructions.  Unspecified symptoms and signs involving the genitourinary system 6/25/2020    Elevated PSA with enlarged, firm prostate on exam on 5/14/2020. 06- visit Right prostate enlarged and firm, concerning in light of the elevated PSA. Past Surgical History:  Past Surgical History:   Procedure Laterality Date    HX HEENT Right     Ear x2 operation    HX OTHER SURGICAL      PROCEDURE ON EAR     HX OTHER SURGICAL      PROCEDURE ON EAR     HX PROSTATE SURGERY  08/10/2020     pelvic lymph node dissection    HX PROSTATECTOMY  08/10/2020    OK PROSTATE BIOPSY, NEEDLE, SATURATION SAMPLING  06/05/2020       Family History:  Family History   Problem Relation Age of Onset    Diabetes Mother     Cancer Father        Social History:  Social History     Tobacco Use    Smoking status: Never Smoker    Smokeless tobacco: Never Used   Vaping Use    Vaping Use: Never used   Substance Use Topics    Alcohol use: Not Currently    Drug use: Never       Allergies:  No Known Allergies      Review of Systems   Review of Systems   Unable to perform ROS: Acuity of condition       Physical Exam   Physical Exam  Vitals and nursing note reviewed. Constitutional:       General: He is not in acute distress. Appearance: He is normal weight. He is not ill-appearing. HENT:      Head: Normocephalic and atraumatic. Right Ear: External ear normal.      Left Ear: External ear normal.      Nose: Nose normal. No rhinorrhea. Mouth/Throat:      Mouth: Mucous membranes are moist.      Pharynx: Oropharynx is clear. Eyes:      Extraocular Movements: Extraocular movements intact. Conjunctiva/sclera: Conjunctivae normal.      Pupils: Pupils are equal, round, and reactive to light. Cardiovascular:      Rate and Rhythm: Regular rhythm. Tachycardia present. Pulses: Normal pulses. Heart sounds: Normal heart sounds. Pulmonary:      Effort: Pulmonary effort is normal. No respiratory distress. Breath sounds: Normal breath sounds. Abdominal:      General: Abdomen is flat. Bowel sounds are normal.      Palpations: Abdomen is soft. Musculoskeletal:         General: No tenderness or deformity. Normal range of motion. Cervical back: Normal range of motion and neck supple. Skin:     General: Skin is warm and dry. Capillary Refill: Capillary refill takes less than 2 seconds. Findings: No bruising, lesion or rash. Neurological:      General: No focal deficit present. Mental Status: He is lethargic, disoriented and confused. Comments: Strong gag. Psychiatric:         Thought Content:  Thought content normal.         Judgment: Judgment normal.         Diagnostic Study Results     Labs -     Recent Results (from the past 12 hour(s))   COVID-19 RAPID TEST    Collection Time: 12/06/21 11:55 AM   Result Value Ref Range    Specimen source Swab      COVID-19 rapid test Not Detected Not Detected     EKG, 12 LEAD, INITIAL    Collection Time: 12/06/21 11:56 AM   Result Value Ref Range    Ventricular Rate 100 BPM    Atrial Rate 100 BPM    P-R Interval 166 ms    QRS Duration 100 ms    Q-T Interval 354 ms    QTC Calculation (Bezet) 456 ms    Calculated P Axis 48 degrees    Calculated R Axis 33 degrees    Calculated T Axis 22 degrees    Diagnosis       Normal sinus rhythm  Normal ECG  When compared with ECG of 8/6/2020  No significant change was found    Confirmed by Peter Roberto ([id number]) on 12/6/2021 12:44:03 PM     CBC WITH AUTOMATED DIFF    Collection Time: 12/06/21 12:15 PM   Result Value Ref Range    WBC 5.7 4.1 - 11.1 K/uL    RBC 5.06 4.10 - 5.70 M/uL    HGB 15.0 12.1 - 17.0 g/dL    HCT 44.5 36.6 - 50.3 %    MCV 87.9 80.0 - 99.0 FL    MCH 29.6 26.0 - 34.0 PG MCHC 33.7 30.0 - 36.5 g/dL    RDW 13.2 11.5 - 14.5 %    PLATELET 846 335 - 291 K/uL    MPV 10.9 8.9 - 12.9 FL    NRBC 0.0 0.0  WBC    ABSOLUTE NRBC 0.00 0.00 - 0.01 K/uL    NEUTROPHILS 66 32 - 75 %    LYMPHOCYTES 28 12 - 49 %    MONOCYTES 5 5 - 13 %    EOSINOPHILS 1 0 - 7 %    BASOPHILS 0 0 - 1 %    IMMATURE GRANULOCYTES 0 0 - 0.5 %    ABS. NEUTROPHILS 3.7 1.8 - 8.0 K/UL    ABS. LYMPHOCYTES 1.6 0.8 - 3.5 K/UL    ABS. MONOCYTES 0.3 0.0 - 1.0 K/UL    ABS. EOSINOPHILS 0.1 0.0 - 0.4 K/UL    ABS. BASOPHILS 0.0 0.0 - 0.1 K/UL    ABS. IMM. GRANS. 0.0 0.00 - 0.04 K/UL    DF AUTOMATED     METABOLIC PANEL, COMPREHENSIVE    Collection Time: 12/06/21 12:15 PM   Result Value Ref Range    Sodium 132 (L) 136 - 145 mmol/L    Potassium 5.8 (H) 3.5 - 5.1 mmol/L    Chloride 99 97 - 108 mmol/L    CO2 25 21 - 32 mmol/L    Anion gap 8 5 - 15 mmol/L    Glucose 493 (H) 65 - 100 mg/dL    BUN 21 (H) 6 - 20 mg/dL    Creatinine 1.33 (H) 0.70 - 1.30 mg/dL    BUN/Creatinine ratio 16 12 - 20      GFR est AA >60 >60 ml/min/1.73m2    GFR est non-AA 54 (L) >60 ml/min/1.73m2    Calcium 9.9 8.5 - 10.1 mg/dL    Bilirubin, total 1.0 0.2 - 1.0 mg/dL    AST (SGOT) 34 15 - 37 U/L    ALT (SGPT) 40 12 - 78 U/L    Alk.  phosphatase 98 45 - 117 U/L    Protein, total 8.1 6.4 - 8.2 g/dL    Albumin 4.0 3.5 - 5.0 g/dL    Globulin 4.1 (H) 2.0 - 4.0 g/dL    A-G Ratio 1.0 (L) 1.1 - 2.2     CK W/ REFLX CKMB    Collection Time: 12/06/21 12:15 PM   Result Value Ref Range    .9 39 - 308 ng/mL   TROPONIN-HIGH SENSITIVITY    Collection Time: 12/06/21 12:15 PM   Result Value Ref Range    Troponin-High Sensitivity 19 0 - 76 ng/L   LACTIC ACID    Collection Time: 12/06/21 12:15 PM   Result Value Ref Range    Lactic acid 4.4 (HH) 0.4 - 2.0 mmol/L   VENOUS BLOOD GAS    Collection Time: 12/06/21 12:30 PM   Result Value Ref Range    VENOUS PH 7.28 (L) 7.31 - 7.41      VENOUS PCO2 56.7 (H) 40 - 50 mmHg    VENOUS PO2 39 36 - 42 mmHg    VENOUS O2 SATURATION 67 60 - 80 % VENOUS BICARBONATE 23 22 - 26 mmol/L    VENOUS BASE DEFICIT 0.9 0 - 2 mmol/L    O2 METHOD Room air      FIO2 21.0 %    SITE Right Radial     GLUCOSE, POC    Collection Time: 12/06/21  1:18 PM   Result Value Ref Range    Glucose (POC) 346 (H) 65 - 117 mg/dL    Performed by Maxime Cuba          Radiologic Studies -   CT HEAD WO CONT   Final Result   1. Left frontoparietal 1.6 cm hyperdense mass and/or hemorrhage products, with   calcification. No surrounding edema or mass effect. Recommend brain MRI with   gadolinium. Gave report to emergency room charge nurse Kevin Foote at 1:30 PM   12/6/2021. CT SPINE CERV WO CONT   Final Result   1. No acute bony findings. 2. Air in the soft tissues posterior to the left   clavicle, May be vascular or posttraumatic. Correlate clinically. XR CHEST PORT   Final Result   No acute findings. CT Results  (Last 48 hours)               12/06/21 1319  CT HEAD WO CONT Final result    Impression:  1. Left frontoparietal 1.6 cm hyperdense mass and/or hemorrhage products, with   calcification. No surrounding edema or mass effect. Recommend brain MRI with   gadolinium. Gave report to emergency room charge nurse Kevin Foote at 1:30 PM   12/6/2021. Narrative: Altered mental status, fall. No comparison. Technique: Axial images  head without IV contrast. Multiplanar reformatting   performed. Dose reduction: All CT scans at this facility are performed using dose reduction   optimization techniques as appropriate to a performed exam including the   following: Automated exposure control, adjustments of the mA and/or kV according   to patient's size, or use of iterative reconstruction technique. Findings: Left frontoparietal 1.2 x 1.1 x 1.6 cm dense mass and/or hemorrhage   products with calcification. No surrounding edema, significant mass effect. No   extra-axial fluid collection or hydrocephalus. Normal position craniocervical   junction.  No calvarial fractures. Mucous retention cyst or polyp right maxillary   sinus. Postoperative right mastoid. Left mastoid air cells are unopacified. 12/06/21 1319  CT SPINE CERV WO CONT Final result    Impression:  1. No acute bony findings. 2. Air in the soft tissues posterior to the left   clavicle, May be vascular or posttraumatic. Correlate clinically. Narrative:  Fall. No comparison. Technique: Axial imaging cervical spine with sagittal and coronal reformatting   and 3-D volume rendering performed by the technologist at the console. Dose reduction: All CT scans at this facility are performed using dose reduction   optimization techniques as appropriate to a performed exam including the   following: Automated exposure control, adjustments of the mA and/or kV according   to patient's size, or use of iterative reconstruction technique       Findings: No listhesis. Vertebral body heights are maintained. No acute   fracture, or jumped or perched facet. Lateral masses symmetric bilaterally. Multilevel disc and facet narrowing, sclerosis, osteophytosis from degenerative   disease. Odontoid intact. No prevertebral soft tissue swelling. There is air in   the soft tissues posterior to the left clavicle. Lung apices clear. Postoperative right mastoid. Chronic right maxillary sinus disease. CXR Results  (Last 48 hours)               12/06/21 1220  XR CHEST PORT Final result    Impression:  No acute findings. Narrative:  Chest pain. Comparison chest x-ray 8/6/2020. Findings: Single frontal view of the chest. Normal cardiomediastinal silhouette. No vascular congestion or pulmonary edema. Chronic elevation of the right   hemidiaphragm. No infiltrate, effusion, pneumothorax. No free air under the   hemidiaphragms. Bones grossly intact. Medical Decision Making and ED Course   I am the first provider for this patient.     I reviewed the vital signs, available nursing notes, past medical history, past surgical history, family history and social history. Vital Signs-Reviewed the patient's vital signs. Patient Vitals for the past 12 hrs:   Temp Pulse Resp BP SpO2   12/06/21 1332 -- (!) 101 22 (!) 118/92 100 %   12/06/21 1236 -- (!) 105 18 115/87 100 %   12/06/21 1155 -- -- -- -- (!) 88 %   12/06/21 1149 97.6 °F (36.4 °C) (!) 103 30 (!) 120/100 (!) 88 %       EKG interpretation:   EKG at 1156 normal sinus rhythm. Incomplete right bundle branch block. Rate of 100. No ST changes. No T wave inversions. Normal intervals. Reason rule out dysrhythmia. Interpreted by ER physician. Records Reviewed: Previous Hospital chart. EMS run report      ED Course:   Initial assessment performed. The patients presenting problems have been discussed, and they are in agreement with the care plan formulated and outlined with them. I have encouraged them to ask questions as they arise throughout their visit. Orders Placed This Encounter    COVID-19 RAPID TEST     mario     Standing Status:   Standing     Number of Occurrences:   1     Order Specific Question:   Is this test for diagnosis or screening? Answer:   Diagnosis of ill patient     Order Specific Question:   Symptomatic for COVID-19 as defined by CDC? Answer:   Yes     Order Specific Question:   Date of Symptom Onset     Answer:   12/5/2021     Order Specific Question:   Hospitalized for COVID-19? Answer:   No     Order Specific Question:   Admitted to ICU for COVID-19? Answer:   No     Order Specific Question:   Employed in healthcare setting? Answer:   No     Order Specific Question:   Resident in a congregate (group) care setting? Answer:   No     Order Specific Question:   Previously tested for COVID-19?      Answer:   Unknown    CULTURE, BLOOD, PAIRED     Standing Status:   Standing     Number of Occurrences:   1    XR CHEST PORT     Standing Status:   Standing     Number of Occurrences:   1     Order Specific Question:   Reason for Exam     Answer:   chest pain    CT HEAD WO CONT     Standing Status:   Standing     Number of Occurrences:   1     Order Specific Question:   Transport     Answer:   Stretcher [5]     Order Specific Question:   Reason for Exam     Answer:   altered mental status/fall     Order Specific Question:   Decision Support Exception     Answer:   Emergency Medical Condition (MA) [1]    CT SPINE CERV WO CONT     Standing Status:   Standing     Number of Occurrences:   1     Order Specific Question:   Transport     Answer:   Stretcher [5]     Order Specific Question:   Reason for Exam     Answer:   fall     Order Specific Question:   Decision Support Exception     Answer:   Emergency Medical Condition (MA) [1]    CBC WITH AUTOMATED DIFF     Standing Status:   Standing     Number of Occurrences:   1    METABOLIC PANEL, COMPREHENSIVE     Standing Status:   Standing     Number of Occurrences:   1    CK W/ REFLX CKMB     Standing Status:   Standing     Number of Occurrences:   1    TROPONIN-HIGH SENSITIVITY     Standing Status:   Standing     Number of Occurrences:   1    LACTIC ACID     Standing Status:   Standing     Number of Occurrences:   1    VENOUS BLOOD GAS     Standing Status:   Standing     Number of Occurrences:   1    BETA-HYDROXYBUTYRATE     Standing Status:   Standing     Number of Occurrences:   1    CARDIAC MONITOR - ED ONLY     Standing Status:   Standing     Number of Occurrences:   1     Order Specific Question:   Type: Answer:   Bedside    OXYGEN CANNULA     Standing Status:   Standing     Number of Occurrences:   1     Order Specific Question:   Liters per minute:      Answer:   2     Order Specific Question:   Indications for O2 therapy     Answer:   CHEST PAIN    VITAL SIGNS Per Protocol     Standing Status:   Standing     Number of Occurrences:   1    MEASURE HEIGHT     Standing Status:   Standing     Number of Occurrences:   1  WEIGH PATIENT     Standing Status:   Standing     Number of Occurrences:   1    OXYGEN CANNULA Liters per minute: 2; Indications for O2 therapy: CHEST PAIN CONTINUOUS STAT     Standing Status:   Standing     Number of Occurrences:   1     Order Specific Question:   Liters per minute: Answer:   2     Order Specific Question:   Indications for O2 therapy     Answer:   CHEST PAIN    GLUCOSE, POC     Standing Status:   Standing     Number of Occurrences:   1    EKG, 12 LEAD, INITIAL     Read by MD within 10 min of arrival. If positive follow STEMI protocol     Standing Status:   Standing     Number of Occurrences:   1     Order Specific Question:   Reason for Exam:     Answer:   chest pain    SALINE LOCK IV ONE TIME STAT     Standing Status:   Standing     Number of Occurrences:   1    DISCONTD: piperacillin-tazobactam (ZOSYN) 3.375 g in 0.9% sodium chloride (MBP/ADV) 100 mL MBP     Order Specific Question:   Antibiotic Indications     Answer:   Upper Respiratory Infection    0.9% sodium chloride infusion    sodium chloride 0.9 % bolus infusion 1,000 mL    piperacillin-tazobactam (ZOSYN) 3.375 g in 0.9% sodium chloride (MBP/ADV) 100 mL MBP     Order Specific Question:   Antibiotic Indications     Answer:   Upper Respiratory Infection    sodium chloride 0.9 % bolus infusion 1,000 mL    insulin regular (NOVOLIN R, HUMULIN R) injection 7 Units                 Provider Notes (Medical Decision Making):   43-year-old male presents with acute altered mental status secondary to DKA versus hypoglycemia versus YANIRA versus pneumonia versus COVID-19 in final diagnosis is patient has an ICH. Have a witnessed ground-level fall unclear if it was syncope related to a mass versus a ground-level fall leading to a bleed. Will transfer to [facility] trauma center.       Consults  Dr Phuong Wing ed doc accept             Transferred to Another Facility    Procedures       CRITICAL CARE NOTE :  2:14 PM  Amount of Critical Care Time: 45 minutes    IMPENDING DETERIORATION -Airway, Respiratory, Cardiovascular and CNS  ASSOCIATED RISK FACTORS - Trauma and CNS Decompensation  MANAGEMENT- Bedside Assessment, Supervision of Care and Transfer  INTERPRETATION -  Xrays and CT Scan  INTERVENTIONS - hemodynamic mngmt  CASE REVIEW - Medical Sub-Specialist and Nursing  TREATMENT RESPONSE -Stable  PERFORMED BY - Self    NOTES   :  I have spent critical care time involved in lab review, consultations with specialist, family decision- making, bedside attention and documentation. This time excludes time spent in any separate billed procedures. During this entire length of time I was immediately available to the patient . Flora Wilson MD                    Disposition       Emergency Department Disposition:  Transferred to Another Facility      Diagnosis     Clinical Impression:   1. Traumatic hemorrhage of cerebrum with loss of consciousness of 30 minutes or less, unspecified laterality, initial encounter ([facility] [address]    2. Fall from ground level    3. Hyperglycemia        Attestations:    Flora Wilson MD    Please note that this dictation was completed with yourdelivery, the computer voice recognition software. Quite often unanticipated grammatical, syntax, homophones, and other interpretive errors are inadvertently transcribed by the computer software. Please disregard these errors. Please excuse any errors that have escaped final proofreading. Thank you.

## 2021-12-12 LAB
BACTERIA SPEC CULT: NORMAL
SPECIAL REQUESTS,SREQ: NORMAL

## 2021-12-16 ENCOUNTER — OFFICE VISIT (OUTPATIENT)
Dept: INTERNAL MEDICINE CLINIC | Age: 66
End: 2021-12-16
Payer: MEDICAID

## 2021-12-16 VITALS
BODY MASS INDEX: 22.37 KG/M2 | DIASTOLIC BLOOD PRESSURE: 86 MMHG | HEART RATE: 84 BPM | WEIGHT: 165.2 LBS | HEIGHT: 72 IN | TEMPERATURE: 97.4 F | SYSTOLIC BLOOD PRESSURE: 140 MMHG | OXYGEN SATURATION: 99 % | RESPIRATION RATE: 18 BRPM

## 2021-12-16 DIAGNOSIS — Z09 HOSPITAL DISCHARGE FOLLOW-UP: ICD-10-CM

## 2021-12-16 DIAGNOSIS — I26.99 ACUTE PULMONARY EMBOLISM, UNSPECIFIED PULMONARY EMBOLISM TYPE, UNSPECIFIED WHETHER ACUTE COR PULMONALE PRESENT (HCC): ICD-10-CM

## 2021-12-16 DIAGNOSIS — E11.65 UNCONTROLLED TYPE 2 DIABETES MELLITUS WITH HYPERGLYCEMIA (HCC): Primary | ICD-10-CM

## 2021-12-16 DIAGNOSIS — I82.402 ACUTE DEEP VEIN THROMBOSIS (DVT) OF LEFT LOWER EXTREMITY, UNSPECIFIED VEIN (HCC): ICD-10-CM

## 2021-12-16 LAB
GLUCOSE POC: 291 MG/DL
HBA1C MFR BLD HPLC: 13.5 %

## 2021-12-16 PROCEDURE — 82962 GLUCOSE BLOOD TEST: CPT | Performed by: INTERNAL MEDICINE

## 2021-12-16 PROCEDURE — 1111F DSCHRG MED/CURRENT MED MERGE: CPT | Performed by: INTERNAL MEDICINE

## 2021-12-16 PROCEDURE — 99496 TRANSJ CARE MGMT HIGH F2F 7D: CPT | Performed by: INTERNAL MEDICINE

## 2021-12-16 PROCEDURE — 83036 HEMOGLOBIN GLYCOSYLATED A1C: CPT | Performed by: INTERNAL MEDICINE

## 2021-12-16 RX ORDER — APIXABAN 5 MG/1
1 TABLET, FILM COATED ORAL 2 TIMES DAILY
COMMUNITY
Start: 2021-12-10 | End: 2021-12-16 | Stop reason: SDUPTHER

## 2021-12-16 RX ORDER — INSULIN GLARGINE 100 [IU]/ML
18 INJECTION, SOLUTION SUBCUTANEOUS DAILY
Qty: 16.2 ML | Refills: 0 | Status: SHIPPED | OUTPATIENT
Start: 2021-12-16 | End: 2022-01-11 | Stop reason: SDUPTHER

## 2021-12-16 RX ORDER — METFORMIN HYDROCHLORIDE 1000 MG/1
1000 TABLET ORAL 2 TIMES DAILY WITH MEALS
Qty: 180 TABLET | Refills: 1 | Status: SHIPPED | OUTPATIENT
Start: 2021-12-16 | End: 2022-07-09

## 2021-12-16 RX ORDER — APIXABAN 5 MG/1
5 TABLET, FILM COATED ORAL 2 TIMES DAILY
Qty: 180 TABLET | Refills: 1 | Status: SHIPPED | OUTPATIENT
Start: 2021-12-16 | End: 2022-07-01

## 2021-12-16 RX ORDER — INSULIN GLARGINE 100 [IU]/ML
10 INJECTION, SOLUTION SUBCUTANEOUS DAILY
COMMUNITY
Start: 2021-12-10 | End: 2021-12-16 | Stop reason: SDUPTHER

## 2021-12-16 NOTE — PROGRESS NOTES
1. Have you been to the ER, urgent care clinic since your last visit? Hospitalized since your last visit? Yes When: 1 week ago Where: [facility] Reason for visit: Blood clots, HTN, DM    2. Have you seen or consulted any other health care providers outside of the [address] [place] [address] since your last visit? Include any pap smears or colon screening. No     Chief Complaint   Patient presents with   [facility] Follow Up    Other     blood clots     Hypertension    Diabetes     Pt is here for a hospital f/u. Pt was seen at [facility].

## 2021-12-16 NOTE — PROGRESS NOTES
Shara Rocha. is a 73 y.o. male and presents with Hospital Follow Up, Other (blood clots ), Hypertension, and Diabetes    He's established with Dr. Josie Valdez, he says he [place] post office and ambulance had to be called, he was hospitalized at [facility], he says bailey was dishcharged last Friday, I don't have the information at this moment unfortunately. He says he was told he had blood clots in his lungs, he is on Eliquis, he denies sob or chest pains. From what I see in his chart, he went first to [facility], his glucose was elevated, he was transferred from there to [facility] trauma center after the fall. I tried to search for [facility] records in [place] and they are not found, so I have not information about the hospitalization       Glucose is 291 A1C 13.5%. he's injecting 12 units of Lantus. He's taking 500 mg metformin 3 times a day as per chart. He's not a very good historian  He says he's feeling well, no son, no CP, has LLE swelling were he had the clot. Review of Systems  Review of Systems   Constitutional: Negative for chills, fatigue, fever and unexpected weight change. HENT: Negative for congestion, ear pain, sneezing and sore throat. Eyes: Negative for pain and discharge. Respiratory: Negative for cough, shortness of breath and wheezing. Cardiovascular: Negative for chest pain, palpitations and leg swelling. Gastrointestinal: Negative for abdominal pain, blood in stool, constipation and diarrhea. Endocrine: Negative for polydipsia and polyuria. Genitourinary: Negative for difficulty urinating, dysuria, frequency, hematuria and urgency. Musculoskeletal: Negative for arthralgias, back pain and joint swelling. Skin: Negative for rash. Allergic/Immunologic: Negative for environmental allergies and food allergies. Neurological: Negative for dizziness, speech difficulty, weakness, light-headedness, numbness and headaches. Hematological: Negative for adenopathy.    Psychiatric/Behavioral: Negative for behavioral problems (Depression), sleep disturbance and suicidal ideas. Past Medical History:   Diagnosis Date    Arthritis     [facility] ([facility]     Diabetes ([facility]     Elevated prostate specific antigen (PSA) 6/25/2020    He is referred from Dr. Toñito Sheffield for elevated PSA. 5/14/2020: 36.8 with 4.3% free. 2/10/2020: 36.3 1/2/2020: 29.5 Right prostate firm and enlarged. 06- visit He has a markedly elevated PSA. He underwent biopsy today, 6/5/20. He is instructed to take his antibiotics as prescribed. He was given post procedure instructions.  Thromboembolus ([facility]     Unspecified symptoms and signs involving the genitourinary system 6/25/2020    Elevated PSA with enlarged, firm prostate on exam on 5/14/2020. 06- visit Right prostate enlarged and firm, concerning in light of the elevated PSA. Past Surgical History:   Procedure Laterality Date    HX HEENT Right     Ear x2 operation    HX OTHER SURGICAL      PROCEDURE ON EAR     HX OTHER SURGICAL      PROCEDURE ON EAR     HX PROSTATE SURGERY  08/10/2020     pelvic lymph node dissection    HX PROSTATECTOMY  08/10/2020    [place] PROSTATE BIOPSY, NEEDLE, SATURATION SAMPLING  06/05/2020     Social History     Socioeconomic History    Marital status: SINGLE   Occupational History    Occupation: RETIRED   Tobacco Use    Smoking status: Never Smoker    Smokeless tobacco: Never Used   Vaping Use    Vaping Use: Never used   Substance and Sexual Activity    Alcohol use: Not Currently    Drug use: Never    Sexual activity: Not Currently     Family History   Problem Relation Age of Onset    Diabetes Mother     Cancer Father      Current Outpatient Medications   Medication Sig Dispense Refill    metFORMIN (GLUCOPHAGE) 1,000 mg tablet Take 1 Tablet by mouth two (2) times daily (with meals) for 180 days. 180 Tablet 1    Lantus U-100 Insulin 100 unit/mL injection 18 Units by SubCUTAneous route daily for 90 days.  16.2 mL 0    Eliquis 5 mg tablet Take 1 Tablet by mouth two (2) times a day for 180 days. 180 Tablet 1    aspirin delayed-release 81 mg tablet Take  by mouth daily. No Known Allergies    Objective:  Visit Vitals  BP (!) 140/86 (BP 1 Location: Right upper arm, BP Patient Position: Sitting, BP Cuff Size: Adult)   Pulse 84   Temp 97.4 °F (36.3 °C) (Oral)   Resp 18   Ht 6' (1.829 m)   Wt 165 lb 3.2 oz (74.9 kg)   SpO2 99% Comment: RA   BMI 22.41 kg/m²     Physical Exam:   Physical Exam  Constitutional:       General: He is not in acute distress. Appearance: Normal appearance. HENT:      Head: Normocephalic and atraumatic. Mouth/Throat:      Mouth: Mucous membranes are moist.   Eyes:      Extraocular Movements: Extraocular movements intact. Conjunctiva/sclera: Conjunctivae normal.      Pupils: Pupils are equal, round, and reactive to light. Cardiovascular:      Rate and Rhythm: Normal rate and regular rhythm. Pulses: Normal pulses. Heart sounds: Normal heart sounds. Pulmonary:      Effort: Pulmonary effort is normal.      Breath sounds: Normal breath sounds. Abdominal:      General: Abdomen is flat. Bowel sounds are normal. There is no distension. Palpations: Abdomen is soft. There is no mass. Tenderness: There is no abdominal tenderness. Musculoskeletal:         General: No swelling or deformity. Cervical back: Normal range of motion and neck supple. Right lower leg: No edema. Left lower leg: No edema. Lymphadenopathy:      Cervical: No cervical adenopathy. Skin:     General: Skin is warm and dry. Capillary Refill: Capillary refill takes less than 2 seconds. Coloration: Skin is not jaundiced or pale. Findings: No erythema or rash. Neurological:      General: No focal deficit present. Mental Status: He is alert and oriented to person, place, and time.    Psychiatric:         Mood and Affect: Mood normal.         Behavior: Behavior normal.         Thought Content: Thought content normal.         Judgment: Judgment normal.          Results for orders placed or performed in visit on 12/16/21   AMB POC HEMOGLOBIN A1C   Result Value Ref Range    Hemoglobin A1c (POC) 13.5 %   AMB POC GLUCOSE BLOOD, BY GLUCOSE MONITORING DEVICE   Result Value Ref Range    Glucose  MG/DL       Assessment/Plan:    Diabetes is uncontrolled, increase Lantus to 18 units and metformin to 1000 mg BID. Asked to bring glucometer to all his appointments, he will be scheduled for his next follow up visit with Dr. Becca Avalos  To continue Eliquis, refill sent. Hopefully we are able to get his records from [facility] at a later time. ICD-10-CM ICD-9-CM    1. Uncontrolled type 2 diabetes mellitus with hyperglycemia ([facility])  E11.65 250.02 metFORMIN (GLUCOPHAGE) 1,000 mg tablet      Lantus U-100 Insulin 100 unit/mL injection      AMB POC HEMOGLOBIN A1C      AMB POC GLUCOSE BLOOD, BY GLUCOSE MONITORING DEVICE   2. Acute pulmonary embolism, unspecified pulmonary embolism type, unspecified whether acute cor pulmonale present ([facility])  I26.99 415.19 HI DISCHARGE MEDS RECONCILED W/ CURRENT OUTPATIENT MED LIST      Eliquis 5 mg tablet   3. Hospital discharge follow-up  Z09 V67.59 HI DISCHARGE MEDS RECONCILED W/ CURRENT OUTPATIENT MED LIST   4. Acute deep vein thrombosis (DVT) of left lower extremity, unspecified vein ([facility])  I82.402 453.40 Eliquis 5 mg tablet     Orders Placed This Encounter    HI DISCHARGE MEDS RECONCILED W/ CURRENT OUTPATIENT MED LIST    AMB POC HEMOGLOBIN A1C    AMB POC GLUCOSE BLOOD, BY GLUCOSE MONITORING DEVICE    DISCONTD: Eliquis 5 mg tablet     Sig: Take 1 Tablet by mouth two (2) times a day.  DISCONTD: Lantus U-100 Insulin 100 unit/mL injection     Sig: 10 Units by SubCUTAneous route daily.  metFORMIN (GLUCOPHAGE) 1,000 mg tablet     Sig: Take 1 Tablet by mouth two (2) times daily (with meals) for 180 days.      Dispense:  180 Tablet     Refill:  1    Lantus U-100 Insulin 100 unit/mL injection     Si Units by SubCUTAneous route daily for 90 days. Dispense:  16.2 mL     Refill:  0    Eliquis 5 mg tablet     Sig: Take 1 Tablet by mouth two (2) times a day for 180 days. Dispense:  180 Tablet     Refill:  1     follow low fat diet, follow low salt diet, call if any problems, bring glucometer   There are no Patient Instructions on file for this visit. Follow-up and Dispositions    · Return in about 2 months (around 2022) for Dr. Toñito Sheffield, Diabetes .

## 2022-01-10 ENCOUNTER — TELEPHONE (OUTPATIENT)
Dept: INTERNAL MEDICINE CLINIC | Age: 67
End: 2022-01-10

## 2022-01-10 NOTE — TELEPHONE ENCOUNTER
[employer]. Is requesting Lantus 100 units\ml vial Qty 10 ml Inject 18 units subcutaneously every morning.  01-10-22

## 2022-01-11 ENCOUNTER — TELEPHONE (OUTPATIENT)
Dept: INTERNAL MEDICINE CLINIC | Age: 67
End: 2022-01-11

## 2022-01-11 DIAGNOSIS — E11.65 UNCONTROLLED TYPE 2 DIABETES MELLITUS WITH HYPERGLYCEMIA (HCC): ICD-10-CM

## 2022-01-11 RX ORDER — INSULIN GLARGINE 100 [IU]/ML
18 INJECTION, SOLUTION SUBCUTANEOUS DAILY
Qty: 16.2 ML | Refills: 1 | Status: SHIPPED | OUTPATIENT
Start: 2022-01-11 | End: 2022-08-08 | Stop reason: SDUPTHER

## 2022-01-13 ENCOUNTER — TELEPHONE (OUTPATIENT)
Dept: INTERNAL MEDICINE CLINIC | Age: 67
End: 2022-01-13

## 2022-01-13 RX ORDER — NAPHAZOLINE HYDROCHLORIDE AND POLYETHYLENE GLYCOL 300 .1; 2 MG/ML; MG/ML
1 SOLUTION/ DROPS OPHTHALMIC DAILY
Qty: 100 EACH | Refills: 0 | Status: SHIPPED | OUTPATIENT
Start: 2022-01-13 | End: 2022-08-08 | Stop reason: SDUPTHER

## 2022-01-13 NOTE — TELEPHONE ENCOUNTER
Olivia is requesting Surecomfort [facility]  29gx 1\2 Dis 30 01-13-22

## 2022-02-02 LAB — PSA SERPL-MCNC: <0.1 NG/ML (ref 0–4)

## 2022-02-08 ENCOUNTER — OFFICE VISIT (OUTPATIENT)
Dept: INTERNAL MEDICINE CLINIC | Age: 67
End: 2022-02-08
Payer: MEDICAID

## 2022-02-08 VITALS
BODY MASS INDEX: 22.48 KG/M2 | OXYGEN SATURATION: 98 % | HEART RATE: 72 BPM | WEIGHT: 166 LBS | HEIGHT: 72 IN | SYSTOLIC BLOOD PRESSURE: 120 MMHG | RESPIRATION RATE: 20 BRPM | TEMPERATURE: 98.5 F | DIASTOLIC BLOOD PRESSURE: 80 MMHG

## 2022-02-08 DIAGNOSIS — Z86.711 HISTORY OF PULMONARY EMBOLISM: ICD-10-CM

## 2022-02-08 DIAGNOSIS — Z85.46 HISTORY OF PROSTATE CANCER: ICD-10-CM

## 2022-02-08 DIAGNOSIS — E11.42 TYPE 2 DIABETES MELLITUS WITH DIABETIC POLYNEUROPATHY, WITHOUT LONG-TERM CURRENT USE OF INSULIN (HCC): Primary | ICD-10-CM

## 2022-02-08 LAB — HBA1C MFR BLD HPLC: 9.1 %

## 2022-02-08 PROCEDURE — 99214 OFFICE O/P EST MOD 30 MIN: CPT | Performed by: INTERNAL MEDICINE

## 2022-02-08 PROCEDURE — 83036 HEMOGLOBIN GLYCOSYLATED A1C: CPT | Performed by: INTERNAL MEDICINE

## 2022-02-08 NOTE — PROGRESS NOTES
1. Have you been to the ER, urgent care clinic since your last visit? Hospitalized since your last visit? Yes When: december 2021 Where: [facility] Reason for visit: Fall/ head injure    2. Have you seen or consulted any other health care providers outside of the [address] [place] [address] since your last visit? Include any pap smears or colon screening.  No     Chief Complaint   Patient presents with    Follow-up    Diabetes       Visit Vitals  /80 (BP 1 Location: Right upper arm, BP Patient Position: Sitting, BP Cuff Size: Adult)   Pulse 72   Temp 98.5 °F (36.9 °C)   Resp 20   Ht 6' (1.829 m)   Wt 166 lb (75.3 kg)   SpO2 98%   BMI 22.51 kg/m²

## 2022-02-09 NOTE — PROGRESS NOTES
DJD changes Bora Lazaro. is a 73 y.o. male and presents with Follow-up and Diabetes  Patient presents for follow-up history of prostate carcinoma followed by urologist and will follow up with an next Thursday with Dr. Santy Lynn doing well at 78years of age continues on Lantus insulin 12 units a day however hemoglobin A1c 9.1 he wishes to continue monitoring blood sugars he also continues with Metformin 500 mg 3 times a day he sees Dr. Jossie Valenzuela endocrinologist as well he wishes to forego any labs today and recheck on return blood pressure 130/80 right arm resting very pleasant and engaging he is moving to [facility] [place] this March 1 we reviewed his medications last labs continues on Lantus insulin Metformin as noted above Eliquis for history of pulmonary embolus but doing fine now-discussed preventive care issues he will follow up with ophthalmology           Review of Systems  Constitutional: negative for fevers, chills, anorexia, weight loss and fatigue,no insomnia  Eyes:   negative for visual disturbance and irritation, eye discharge, eye pain. no eye redness. ENT:   negative for tinnitus, sore throat, nasal congestion, ear pain, hoarseness, hearing loss.,no snoring. Respiratory:  negative for cough, hemoptysis, shortness of breath, wheezing,  CV:   negative for chest pain, palpitations, lower extremity edema, shortness of breath while sitting, walking or at night  GI:   negative for nausea, vomiting, diarrhea, abdominal pain,melena,constipation. Endo:               negative for polyuria, polydipsia, polyphagia, cold or heat intolerance,hair loss. Genitourinary: negative for frequency, dysuria and hematuria,urethral discharge,nocturia.straining while urination,urinary incontinence.   Integument:  negative for rash and pruritus  Hematologic:  negative for easy bruising and gum/nose bleeding, enlarged nodes  Musculoskel: negative for myalgias, arthralgias, back pain, muscle weakness, joint pain, h/o fall,cramps,calf pain. Neurological:  negative for headaches, dizziness, vertigo, memory problems, gait and seizures loss of consciousness,no ataxia. Behavl/Psych: negative for feelings of anxiety, depression, mood changes ,sadness    Past Medical History:   Diagnosis Date    Arthritis     [facility] ([facility]     Diabetes ([facility]     Elevated prostate specific antigen (PSA) 6/25/2020    He is referred from Dr. Jm Georges for elevated PSA. 5/14/2020: 36.8 with 4.3% free. 2/10/2020: 36.3 1/2/2020: 29.5 Right prostate firm and enlarged. 06- visit He has a markedly elevated PSA. He underwent biopsy today, 6/5/20. He is instructed to take his antibiotics as prescribed. He was given post procedure instructions.  [facility]     Unspecified symptoms and signs involving the genitourinary system 6/25/2020    Elevated PSA with enlarged, firm prostate on exam on 5/14/2020. 06- visit Right prostate enlarged and firm, concerning in light of the elevated PSA.      Past Surgical History:   Procedure Laterality Date    HX HEENT Right     Ear x2 operation    HX OTHER SURGICAL      PROCEDURE ON EAR     HX OTHER SURGICAL      PROCEDURE ON EAR     HX PROSTATE SURGERY  08/10/2020     pelvic lymph node dissection    HX PROSTATECTOMY  08/10/2020    [place] PROSTATE BIOPSY, NEEDLE, SATURATION SAMPLING  06/05/2020     Social History     Socioeconomic History    Marital status: SINGLE   Occupational History    Occupation: RETIRED   Tobacco Use    Smoking status: Never Smoker    Smokeless tobacco: Never Used   Vaping Use    Vaping Use: Never used   Substance and Sexual Activity    Alcohol use: Not Currently    Drug use: Never    Sexual activity: Not Currently     Family History   Problem Relation Age of Onset    Diabetes Mother     Cancer Father      Current Outpatient Medications   Medication Sig Dispense Refill    insulin syringe-needle U-100 (Sure Comfort Insulin Syringe) 1 mL 29 gauge x 1/2\" syrg 1 Syringe by Does Not Apply route daily. 100 Each 0    Lantus U-100 Insulin 100 unit/mL injection 18 Units by SubCUTAneous route daily for 180 days. 16.2 mL 1    metFORMIN (GLUCOPHAGE) 1,000 mg tablet Take 1 Tablet by mouth two (2) times daily (with meals) for 180 days. 180 Tablet 1    Eliquis 5 mg tablet Take 1 Tablet by mouth two (2) times a day for 180 days. 180 Tablet 1    aspirin delayed-release 81 mg tablet Take  by mouth daily. No Known Allergies    Objective:  Visit Vitals  /80 (BP 1 Location: Right upper arm, BP Patient Position: Sitting, BP Cuff Size: Adult)   Pulse 72   Temp 98.5 °F (36.9 °C)   Resp 20   Ht 6' (1.829 m)   Wt 166 lb (75.3 kg)   SpO2 98%   BMI 22.51 kg/m²       Physical Exam:   Constitutional: General Appearance: healthy-appearing and obese. Level of Distress: NAD. Ambulation: ambulating normally. Psychiatric: Mental Status: normal mood and affect and active and alert. Orientation: to time, place, and person. no agitation. ,normal eye contact. normal insight  Head: Head: normocephalic and atraumatic. Eyes: Pupils: PERRLA. Sclerae: non-icteric. ENMT: No lesions on external ear, no hearing loss. No lesions on external nose, sinus tenderness, or nasal discharge. Lips, Teeth, and no mouth or lip ulcers   Neck: Neck: supple, trachea midline, and no masses. Lymph Nodes: no cervical LAD. Thyroid: no enlargement or nodules and non-tender. Lungs: Respiratory effort: no dyspnea. Auscultation: no wheezing, rales/crackles, or rhonchi and breath sounds normal and good air movement. Cardiovascular: Apical Impulse: not displaced. Heart Auscultation: normal S1 and S2; no murmurs, rubs, or gallops; and RRR. Neck vessels: no carotid bruits. Pulses including femoral / pedal: normal throughout. Abdomen: Bowel Sounds: normal. Inspection and Palpation: no tenderness, guarding, or masses and soft and non-distended. Liver: non-tender and no hepatomegaly.    Musculoskeletal[de-identified] Extremities: no edema or varicosities. Calf tenderness. DJD  Neurologic: Gait and Station: normal gait and station. Motor Strength normal right and left. Sensory and cerebellar intact. Skin: Inspection and palpation: no rash, lesions, or ulcer. Results for orders placed or performed in visit on 02/08/22   AMB POC HEMOGLOBIN A1C   Result Value Ref Range    Hemoglobin A1c (POC) 9.1 %       Assessment/Plan:    ICD-10-CM ICD-9-CM    1. Type 2 diabetes mellitus with diabetic polyneuropathy, without long-term current use of insulin (HCC)  E11.42 250.60 AMB POC HEMOGLOBIN A1C     357.2    2. History of pulmonary embolism  Z86.711 V12.55    3. History of prostate cancer  Z85.46 V10.46      Orders Placed This Encounter    AMB POC HEMOGLOBIN A1C       continue present plan, call if any problems    There are no Patient Instructions on file for this visit. Follow-up and Dispositions    · Return in about 6 months (around 8/8/2022).

## 2022-02-10 ENCOUNTER — OFFICE VISIT (OUTPATIENT)
Dept: UROLOGY | Age: 67
End: 2022-02-10
Payer: MEDICAID

## 2022-02-10 VITALS — HEIGHT: 72 IN | BODY MASS INDEX: 22.48 KG/M2 | WEIGHT: 166 LBS

## 2022-02-10 DIAGNOSIS — N52.9 IMPOTENCE: ICD-10-CM

## 2022-02-10 DIAGNOSIS — R32 URINARY INCONTINENCE, UNSPECIFIED TYPE: ICD-10-CM

## 2022-02-10 DIAGNOSIS — N28.1 RENAL CYST: ICD-10-CM

## 2022-02-10 DIAGNOSIS — C61 MALIGNANT NEOPLASM OF PROSTATE (HCC): Primary | ICD-10-CM

## 2022-02-10 LAB
BILIRUB UR QL: NEGATIVE
GLUCOSE UR-MCNC: NEGATIVE MG/DL
KETONES P FAST UR STRIP-MCNC: NEGATIVE MG/DL
PH UR STRIP: 5 [PH] (ref 4.6–8)
PROT UR QL STRIP: NEGATIVE
SP GR UR STRIP: 1.03 (ref 1–1.03)
UA UROBILINOGEN AMB POC: NORMAL (ref 0.2–1)
URINALYSIS CLARITY POC: CLEAR
URINALYSIS COLOR POC: YELLOW
URINE BLOOD POC: NEGATIVE
URINE LEUKOCYTES POC: NEGATIVE
URINE NITRITES POC: NEGATIVE

## 2022-02-10 PROCEDURE — 99214 OFFICE O/P EST MOD 30 MIN: CPT | Performed by: UROLOGY

## 2022-02-10 PROCEDURE — 81003 URINALYSIS AUTO W/O SCOPE: CPT | Performed by: UROLOGY

## 2022-02-10 NOTE — LETTER
2/10/2022    Patient: Analisa Francisco. YOB: 1955   Date of Visit: 2/10/2022     Elysia Huang MD  [address] [place] [address]  [facility] [facility] [place]    Dear Elysia Huang MD,      Thank you for referring Mr. Deborah Briones to [place] [address] for evaluation. My notes for this consultation are attached. If you have questions, please do not hesitate to call me. I look forward to following your patient along with you.       Sincerely,    Virl Osgood, MD
Anemia

## 2022-02-10 NOTE — PROGRESS NOTES
Chief Complaint   Patient presents with    Follow-up    Prostate Cancer    Erectile Dysfunction    Urinary Incontinence     1. Have you been to the ER, urgent care clinic since your last visit? Hospitalized since your last visit? Yes Where: [facility]    2. Have you seen or consulted any other health care providers outside of the [address] [place] [address] since your last visit? Include any pap smears or colon screening.  No  Visit Vitals  Ht 6' (1.829 m)   Wt 166 lb (75.3 kg)   BMI 22.51 kg/m²

## 2022-02-10 NOTE — PROGRESS NOTES
HISTORY OF PRESENT ILLNESS  Al Vega. is a 73 y.o. male. Chief Complaint   Patient presents with    Follow-up    Prostate Cancer    Erectile Dysfunction    Urinary Incontinence     Past Medical History:  PMHx (including negatives):  has a past medical history of Arthritis, Cancer ([facility] Diabetes ([facility] Elevated prostate specific antigen (PSA) (6/25/2020), Thromboembolus ([facility] [address] and Unspecified symptoms and signs involving the genitourinary system (6/25/2020). PSurgHx:  has a past surgical history that includes hx other surgical; hx other surgical; pr prostate biopsy, needle, saturation sampling (06/05/2020); hx prostatectomy (08/10/2020); hx prostate surgery (08/10/2020); and hx heent (Right). PSocHx:  reports that he has never smoked. He has never used smokeless tobacco. He reports previous alcohol use. He reports that he does not use drugs. He is s/p prostatectomy and PLND on 8/10/2020. Bladder neck margin positive, Derrick's 4+4, pT3aN0. PSA has been undetectable, last drawn on 2/1/22. Chronic Conditions Addressed Today     1. Malignant neoplasm of prostate ([facility] - Primary     Overview      Pretreatment PSA 36.8 with 4.3% free. He is s/p biopsy on 6/5/2020. He has Derrick's 6 disease in 3/12 cores with up to 70% involvment and Derrick's 3+4 disease in 1/12 cores with 80% involvement. He is s/p prostatectomy and PLND on 8/10/2020. Bladder neck margin positive, [place]'s 4+4, pT3aN0. PSA undetectable 9/22/2020. He did not have his PSA drawn at his November visit. 4/9/21: undetectable  7/1/21: undetectable. 10/1/21: undetectable. 2/1/22: undetectable. 2. Impotence     Overview      He is s/p prostatectomy 8/2020.    4/9/21: he is not in a relationship but notes no tumescence. He is advised not to respond to ads on TV in regards to erectile function. Dinah Morales MD.    7/9/21: able to function, not in a relationship.     1/11/21: Reports the ability to function but is not in a relationship currently. ROS  Patient denies the symptoms of COVID-19 per routine screening guidelines. Physical Exam    ASSESSMENT and PLAN  Diagnoses and all orders for this visit:    1. Malignant neoplasm of prostate [facility])  Assessment & Plan:  H/o robotic prostatectomy 8/2020. High risk but doing well. PSA undetectable. Orders:  -     PSA, DIAGNOSTIC (PROSTATE SPECIFIC AG); Future    2. Impotence  Assessment & Plan:   Not a concern. Orders:  -     AMB POC URINALYSIS DIP STICK AUTO W/O MICRO    3. Renal cyst  Assessment & Plan:  H/o benign renal cyst.       4. Urinary incontinence, unspecified type  Assessment & Plan:   Resolved. Orders:  -     AMB POC URINALYSIS DIP STICK AUTO W/O MICRO             Ivan Arreguin. may have a reminder for a \"due or due soon\" health maintenance. The patient has been encouraged to contact their primary care provider for follow-up on this health maintenance or other necessary and/or routine health screening.      Umair Lew MD

## 2022-02-12 DIAGNOSIS — C61 MALIGNANT NEOPLASM OF PROSTATE (HCC): ICD-10-CM

## 2022-03-18 PROBLEM — N52.9 IMPOTENCE: Status: ACTIVE | Noted: 2020-11-23

## 2022-03-18 PROBLEM — F84.0 AUTISM: Status: ACTIVE | Noted: 2020-08-17

## 2022-03-19 PROBLEM — N20.0 KIDNEY STONE: Status: ACTIVE | Noted: 2020-08-17

## 2022-03-19 PROBLEM — K76.9 HEPATIC LESION: Status: ACTIVE | Noted: 2020-08-17

## 2022-03-19 PROBLEM — L60.3 ONYCHODYSTROPHY: Status: ACTIVE | Noted: 2020-11-03

## 2022-03-19 PROBLEM — C61 MALIGNANT NEOPLASM OF PROSTATE (HCC): Status: ACTIVE | Noted: 2020-06-17

## 2022-03-19 PROBLEM — L85.3 XEROSIS CUTIS: Status: ACTIVE | Noted: 2020-11-03

## 2022-03-19 PROBLEM — R32 URINARY INCONTINENCE: Status: ACTIVE | Noted: 2020-08-17

## 2022-03-19 PROBLEM — E11.42 TYPE 2 DIABETES MELLITUS WITH DIABETIC POLYNEUROPATHY, WITHOUT LONG-TERM CURRENT USE OF INSULIN (HCC): Status: ACTIVE | Noted: 2021-07-14

## 2022-03-20 PROBLEM — N28.1 RENAL CYST: Status: ACTIVE | Noted: 2020-08-17

## 2022-04-27 NOTE — ED TRIAGE NOTES
[place], initially altered but is now alert and oriented. bgl [phone] with EMS. Pt takes \"sugar pills, no insulin\"  Pt noted to have frequent cough.  EMS denies striking head and is unk on blood thinners
Yes

## 2022-04-30 LAB — PSA SERPL-MCNC: 0.1 NG/ML (ref 0–4)

## 2022-05-10 DIAGNOSIS — C61 MALIGNANT NEOPLASM OF PROSTATE (HCC): ICD-10-CM

## 2022-05-11 NOTE — PROGRESS NOTES
HISTORY OF PRESENT ILLNESS  Mahogany Perez is a 78 y.o. male. No chief complaint on file. Past Medical History:  PMHx (including negatives):  has a past medical history of Arthritis, Cancer ([facility] Diabetes ([facility] Elevated prostate specific antigen (PSA) (6/25/2020), Thromboembolus ([facility] and Unspecified symptoms and signs involving the genitourinary system (6/25/2020). PSurgHx:  has a past surgical history that includes hx other surgical; hx other surgical; pr prostate biopsy, needle, saturation sampling (06/05/2020); hx prostatectomy (08/10/2020); hx prostate surgery (08/10/2020); and hx heent (Right). PSocHx:  reports that he has never smoked. He has never used smokeless tobacco. He reports previous alcohol use. He reports that he does not use drugs. He is status post prostatectomy on 8/10/2020. His bladder neck margin was positive. Pathology significant for [place]ita 4+4 disease; pT3aN0. PSA had been undetectable until 4/29/2022 and it came back at 0.1. Urine control has been good. It is not an issue. Sexual function is not important to him at this time as he has not in a relationship. Chronic Conditions Addressed Today     1. Malignant neoplasm of prostate (HCC)     Overview      Pretreatment PSA 36.8 with 4.3% free. He is s/p biopsy on 6/5/2020. He has Derrick's 6 disease in 3/12 cores with up to 70% involvment and Derrick's 3+4 disease in 1/12 cores with 80% involvement. He is s/p prostatectomy and PLND on 8/10/2020. Bladder neck margin positive, Derrick's 4+4, pT3aN0. PSA undetectable 9/22/2020. He did not have his PSA drawn at his November visit. 4/9/21: undetectable  7/1/21: undetectable. 10/1/21: undetectable. 2/1/22: undetectable. 4/29/22: PSA came back at 0.1. ROS  Patient denies the symptoms of COVID-19 per routine screening guidelines. Physical Exam    ASSESSMENT and PLAN  Diagnoses and all orders for this visit:    1.  Malignant neoplasm of prostate ([facility]               Karina Hicks. may have a reminder for a \"due or due soon\" health maintenance. The patient has been encouraged to contact their primary care provider for follow-up on this health maintenance or other necessary and/or routine health screening.      Gonzalez Fernando, NP

## 2022-05-12 ENCOUNTER — OFFICE VISIT (OUTPATIENT)
Dept: UROLOGY | Age: 67
End: 2022-05-12
Payer: MEDICAID

## 2022-05-12 VITALS
SYSTOLIC BLOOD PRESSURE: 128 MMHG | HEIGHT: 72 IN | BODY MASS INDEX: 22.48 KG/M2 | WEIGHT: 166 LBS | DIASTOLIC BLOOD PRESSURE: 80 MMHG | TEMPERATURE: 97.8 F | HEART RATE: 79 BPM

## 2022-05-12 DIAGNOSIS — R97.21 INCREASING PSA LEVEL AFTER TREATMENT FOR PROSTATE CANCER: ICD-10-CM

## 2022-05-12 DIAGNOSIS — R31.29 MICROSCOPIC HEMATURIA: ICD-10-CM

## 2022-05-12 DIAGNOSIS — C61 MALIGNANT NEOPLASM OF PROSTATE (HCC): Primary | ICD-10-CM

## 2022-05-12 DIAGNOSIS — F84.0 AUTISM: ICD-10-CM

## 2022-05-12 PROBLEM — R32 URINARY INCONTINENCE: Status: RESOLVED | Noted: 2020-08-17 | Resolved: 2022-05-12

## 2022-05-12 PROBLEM — N52.9 IMPOTENCE: Status: RESOLVED | Noted: 2020-11-23 | Resolved: 2022-05-12

## 2022-05-12 LAB
BILIRUB UR QL: NEGATIVE
GLUCOSE UR-MCNC: NEGATIVE MG/DL
KETONES P FAST UR STRIP-MCNC: NEGATIVE MG/DL
PH UR STRIP: 5 [PH] (ref 4.6–8)
PROT UR QL STRIP: NEGATIVE
SP GR UR STRIP: 1.02 (ref 1–1.03)
UA UROBILINOGEN AMB POC: NORMAL (ref 0.2–1)
URINALYSIS CLARITY POC: CLEAR
URINALYSIS COLOR POC: YELLOW
URINE BLOOD POC: NEGATIVE
URINE LEUKOCYTES POC: NEGATIVE
URINE NITRITES POC: NEGATIVE

## 2022-05-12 PROCEDURE — 81003 URINALYSIS AUTO W/O SCOPE: CPT | Performed by: UROLOGY

## 2022-05-12 PROCEDURE — 99214 OFFICE O/P EST MOD 30 MIN: CPT | Performed by: UROLOGY

## 2022-05-12 NOTE — ASSESSMENT & PLAN NOTE
He has high risk prostate cancer with Derrick's 8 disease s/p prostatectomy 8/10/2020. PSA trace detectable 4/29/22. Repeat PSA in 3m.

## 2022-05-12 NOTE — PROGRESS NOTES
Chief Complaint   Patient presents with    Follow-up    Prostate Cancer    Erectile Dysfunction    Renal Cyst    Urinary Incontinence       PHQ-9 score is    Negative    Vitals:    05/12/22 1336   BP: 128/80   Pulse: 79   Temp: 97.8 °F (36.6 °C)   TempSrc: Temporal   Weight: 166 lb (75.3 kg)   Height: 6' (1.829 m)   PainSc:   0 - No pain      1. \"Have you been to the ER, urgent care clinic since your last visit? Hospitalized since your last visit? \" No    2. \"Have you seen or consulted any other health care providers outside of the [address] [place] [address] since your last visit? \" No     3. For patients aged 39-70: Has the patient had a colonoscopy / FIT/ Cologuard? No      If the patient is female:    4. For patients aged 41-77: Has the patient had a mammogram within the past 2 years? No      5. For patients aged 21-65: Has the patient had a pap smear?  No

## 2022-06-29 DIAGNOSIS — I26.99 ACUTE PULMONARY EMBOLISM, UNSPECIFIED PULMONARY EMBOLISM TYPE, UNSPECIFIED WHETHER ACUTE COR PULMONALE PRESENT (HCC): ICD-10-CM

## 2022-06-29 DIAGNOSIS — I82.402 ACUTE DEEP VEIN THROMBOSIS (DVT) OF LEFT LOWER EXTREMITY, UNSPECIFIED VEIN (HCC): ICD-10-CM

## 2022-07-01 RX ORDER — APIXABAN 5 MG/1
5 TABLET, FILM COATED ORAL 2 TIMES DAILY
Qty: 180 TABLET | Refills: 0 | Status: SHIPPED | OUTPATIENT
Start: 2022-07-01 | End: 2022-08-08 | Stop reason: SDUPTHER

## 2022-07-06 DIAGNOSIS — E11.65 UNCONTROLLED TYPE 2 DIABETES MELLITUS WITH HYPERGLYCEMIA (HCC): ICD-10-CM

## 2022-07-09 RX ORDER — METFORMIN HYDROCHLORIDE 1000 MG/1
TABLET ORAL
Qty: 60 TABLET | Refills: 0 | Status: SHIPPED | OUTPATIENT
Start: 2022-07-09 | End: 2022-08-08 | Stop reason: SDUPTHER

## 2022-08-02 LAB — PSA SERPL-MCNC: 0.1 NG/ML (ref 0–4)

## 2022-08-08 ENCOUNTER — TELEPHONE (OUTPATIENT)
Dept: INTERNAL MEDICINE CLINIC | Age: 67
End: 2022-08-08

## 2022-08-08 ENCOUNTER — OFFICE VISIT (OUTPATIENT)
Dept: INTERNAL MEDICINE CLINIC | Age: 67
End: 2022-08-08
Payer: MEDICAID

## 2022-08-08 VITALS
BODY MASS INDEX: 22.62 KG/M2 | HEIGHT: 72 IN | TEMPERATURE: 99.2 F | SYSTOLIC BLOOD PRESSURE: 116 MMHG | OXYGEN SATURATION: 83 % | RESPIRATION RATE: 17 BRPM | DIASTOLIC BLOOD PRESSURE: 81 MMHG | WEIGHT: 167 LBS

## 2022-08-08 DIAGNOSIS — N18.31 CHRONIC RENAL FAILURE, STAGE 3A (HCC): ICD-10-CM

## 2022-08-08 DIAGNOSIS — E11.65 UNCONTROLLED TYPE 2 DIABETES MELLITUS WITH HYPERGLYCEMIA (HCC): Primary | ICD-10-CM

## 2022-08-08 DIAGNOSIS — Z11.59 NEED FOR HEPATITIS C SCREENING TEST: ICD-10-CM

## 2022-08-08 DIAGNOSIS — L84 CALLUS OF TOE: ICD-10-CM

## 2022-08-08 DIAGNOSIS — I26.92 SADDLE EMBOLUS OF PULMONARY ARTERY WITHOUT ACUTE COR PULMONALE, UNSPECIFIED CHRONICITY (HCC): ICD-10-CM

## 2022-08-08 DIAGNOSIS — E11.65 UNCONTROLLED TYPE 2 DIABETES MELLITUS WITH HYPERGLYCEMIA (HCC): ICD-10-CM

## 2022-08-08 DIAGNOSIS — I26.99 ACUTE PULMONARY EMBOLISM, UNSPECIFIED PULMONARY EMBOLISM TYPE, UNSPECIFIED WHETHER ACUTE COR PULMONALE PRESENT (HCC): ICD-10-CM

## 2022-08-08 DIAGNOSIS — I82.402 ACUTE DEEP VEIN THROMBOSIS (DVT) OF LEFT LOWER EXTREMITY, UNSPECIFIED VEIN (HCC): ICD-10-CM

## 2022-08-08 PROBLEM — N18.30 CHRONIC RENAL DISEASE, STAGE III (HCC): Status: ACTIVE | Noted: 2022-08-08

## 2022-08-08 LAB
GLUCOSE POC: 203 MG/DL
HBA1C MFR BLD HPLC: 6.7 %

## 2022-08-08 PROCEDURE — 82962 GLUCOSE BLOOD TEST: CPT | Performed by: INTERNAL MEDICINE

## 2022-08-08 PROCEDURE — 99214 OFFICE O/P EST MOD 30 MIN: CPT | Performed by: INTERNAL MEDICINE

## 2022-08-08 PROCEDURE — 83036 HEMOGLOBIN GLYCOSYLATED A1C: CPT | Performed by: INTERNAL MEDICINE

## 2022-08-08 RX ORDER — LANCETS
EACH MISCELLANEOUS
Qty: 100 EACH | Refills: 3 | Status: SHIPPED | OUTPATIENT
Start: 2022-08-08

## 2022-08-08 RX ORDER — CALCIUM CITRATE/VITAMIN D3 200MG-6.25
TABLET ORAL
Qty: 100 STRIP | Refills: 3 | Status: SHIPPED | OUTPATIENT
Start: 2022-08-08

## 2022-08-08 RX ORDER — INSULIN GLARGINE 100 [IU]/ML
20 INJECTION, SOLUTION SUBCUTANEOUS DAILY
Qty: 18 ML | Refills: 0 | Status: SHIPPED | OUTPATIENT
Start: 2022-08-08 | End: 2022-11-06

## 2022-08-08 RX ORDER — NAPHAZOLINE HYDROCHLORIDE AND POLYETHYLENE GLYCOL 300 .1; 2 MG/ML; MG/ML
1 SOLUTION/ DROPS OPHTHALMIC DAILY
Qty: 100 EACH | Refills: 0 | Status: CANCELLED | OUTPATIENT
Start: 2022-08-08

## 2022-08-08 RX ORDER — NAPHAZOLINE HYDROCHLORIDE AND POLYETHYLENE GLYCOL 300 .1; 2 MG/ML; MG/ML
1 SOLUTION/ DROPS OPHTHALMIC DAILY
Qty: 100 EACH | Refills: 3 | Status: SHIPPED | OUTPATIENT
Start: 2022-08-08

## 2022-08-08 NOTE — PROGRESS NOTES
1. \"Have you been to the ER, urgent care clinic since your last visit? Hospitalized since your last visit? \" No    2. \"Have you seen or consulted any other health care providers outside of the [address] [place] [address] since your last visit? \" No     3. For patients aged 39-70: Has the patient had a colonoscopy / FIT/ Cologuard? No      If the patient is female:    4. For patients aged 41-77: Has the patient had a mammogram within the past 2 years? NA - based on age or sex      11. For patients aged 21-65: Has the patient had a pap smear?  NA - based on age or sex      Chief Complaint   Patient presents with    Diabetes     Glu 203     Medication Refill        Visit Vitals  /81 (BP 1 Location: Right upper arm, BP Patient Position: Sitting, BP Cuff Size: Adult)   Temp 99.2 °F (37.3 °C) (Oral)   Resp 17   Ht 6' (1.829 m)   Wt 167 lb (75.8 kg)   SpO2 (!) 83%   BMI 22.65 kg/m²

## 2022-08-08 NOTE — PROGRESS NOTES
Fernanda Muniz. is a 78 y.o. male and presents with Diabetes (Glu 203 ) and Medication Refill    Baljeet Nur has a cognitive disability, he was under the care of Dr. Meet Howard, establishing care with me today, he is here alone, he's not a great historian. He has type 2 Diabetes as per Dr. Sarah Galicia note from February he was continued on 12 units of Lantus, but back in January that he came for hospital follow-up with me I increased his Lantus to 18 units. When I asked him how many units he is injecting he first said 21, then he said that he was injecting what I told him in January, so I told him it was 18 units that I told him but then and then he said oh yes I remember I am injecting that. So I am not really sure if it is really injecting the 18 units. He says that he injects his every night, he is not checking his glucose at all he says that maybe he checked it a week ago but he does not remember how much was it. He says that he does not have strips or lancets. He does not remember the name brand of his glucometer. 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 Denies any bleeding. He feels well, denies any cardiovascular symptoms, he does say that he has some pins-and-needles and burning pains in his feet intermittently, not every day which do not prevent him from being functional.  He lives alone and takes care of all his ADLs. Review of Systems  Review of Systems   Constitutional:  Negative for chills, fatigue, fever and unexpected weight change. HENT:  Negative for congestion, ear pain, sneezing and sore throat. Eyes:  Negative for pain and discharge. Respiratory:  Negative for cough, shortness of breath and wheezing. Cardiovascular:  Negative for chest pain, palpitations and leg swelling. Gastrointestinal:  Negative for abdominal pain, blood in stool, constipation and diarrhea. Endocrine: Negative for polydipsia and polyuria.    Genitourinary:  Negative for difficulty urinating, dysuria, frequency, hematuria and urgency. Musculoskeletal:  Negative for arthralgias, back pain and joint swelling. Skin:  Negative for rash. Allergic/Immunologic: Negative for environmental allergies and food allergies. Neurological:  Negative for dizziness, speech difficulty, weakness, light-headedness, numbness and headaches. Hematological:  Negative for adenopathy. Psychiatric/Behavioral:  Negative for behavioral problems (Depression), sleep disturbance and suicidal ideas. Past Medical History:   Diagnosis Date    Arthritis     Cancer ([facility]     Diabetes ([facility]     Elevated prostate specific antigen (PSA) 6/25/2020    He is referred from Dr. Fariba Epstein for elevated PSA. 5/14/2020: 36.8 with 4.3% free. 2/10/2020: 36.3 1/2/2020: 29.5 Right prostate firm and enlarged. 06- visit He has a markedly elevated PSA. He underwent biopsy today, 6/5/20. He is instructed to take his antibiotics as prescribed. He was given post procedure instructions. [facility]     Unspecified symptoms and signs involving the genitourinary system 6/25/2020    Elevated PSA with enlarged, firm prostate on exam on 5/14/2020. 06- visit Right prostate enlarged and firm, concerning in light of the elevated PSA.      Past Surgical History:   Procedure Laterality Date    HX HEENT Right     Ear x2 operation    HX OTHER SURGICAL      PROCEDURE ON EAR     HX OTHER SURGICAL      PROCEDURE ON EAR     HX PROSTATE SURGERY  08/10/2020     pelvic lymph node dissection    HX PROSTATECTOMY  08/10/2020    NC PROSTATE BIOPSY, NEEDLE, SATURATION SAMPLING  06/05/2020     Social History     Socioeconomic History    Marital status: SINGLE   Occupational History    Occupation: RETIRED   Tobacco Use    Smoking status: Never    Smokeless tobacco: Never   Vaping Use    Vaping Use: Never used   Substance and Sexual Activity    Alcohol use: Not Currently    Drug use: Never    Sexual activity: Not Currently Family History   Problem Relation Age of Onset    Diabetes Mother     Cancer Father      Current Outpatient Medications   Medication Sig Dispense Refill    Lantus U-100 Insulin 100 unit/mL injection Take 20 Units by SubCUTAneous route in the morning for 90 days. 18 mL 0    insulin syringe-needle U-100 (Sure Comfort Insulin Syringe) 1 mL 29 gauge x 1/2\" syrg 1 Syringe by Does Not Apply route daily. 100 Each 3    metFORMIN (GLUCOPHAGE) 1,000 mg tablet TAKE 1 TABLET BY MOUTH TWICE DAILY WITH MEALS 60 Tablet 0    Eliquis 5 mg tablet Take 1 Tablet by mouth two (2) times a day for 90 days. 180 Tablet 0    aspirin delayed-release 81 mg tablet Take  by mouth daily. No Known Allergies    Objective:  Visit Vitals  /81 (BP 1 Location: Right upper arm, BP Patient Position: Sitting, BP Cuff Size: Adult)   Temp 99.2 °F (37.3 °C) (Oral)   Resp 17   Ht 6' (1.829 m)   Wt 167 lb (75.8 kg)   SpO2 (!) 83%   BMI 22.65 kg/m²     Physical Exam:   Physical Exam  Constitutional:       General: He is not in acute distress. Appearance: Normal appearance. HENT:      Head: Normocephalic and atraumatic. Mouth/Throat:      Mouth: Mucous membranes are moist.   Eyes:      Extraocular Movements: Extraocular movements intact. Conjunctiva/sclera: Conjunctivae normal.      Pupils: Pupils are equal, round, and reactive to light. Cardiovascular:      Rate and Rhythm: Normal rate and regular rhythm. Pulses: Normal pulses. Dorsalis pedis pulses are 2+ on the right side and 2+ on the left side. Posterior tibial pulses are 2+ on the right side and 2+ on the left side. Heart sounds: Normal heart sounds. Pulmonary:      Effort: Pulmonary effort is normal.      Breath sounds: Normal breath sounds. Abdominal:      General: Abdomen is flat. Bowel sounds are normal. There is no distension. Palpations: Abdomen is soft. There is no mass. Tenderness: There is no abdominal tenderness. Musculoskeletal:         General: No swelling or deformity. Cervical back: Normal range of motion and neck supple. Right lower leg: No edema. Left lower leg: No edema. Right foot: Normal range of motion. Prominent metatarsal heads present. No deformity, bunion, Charcot foot or foot drop. Left foot: Prominent metatarsal heads present. Feet:      Right foot:      Protective Sensation: 10 sites tested. 7 sites sensed. Skin integrity: Callus present. Toenail Condition: Right toenails are abnormally thick and long. Left foot:      Protective Sensation: 10 sites tested. 5 sites sensed. Toenail Condition: Left toenails are abnormally thick and long. Lymphadenopathy:      Cervical: No cervical adenopathy. Skin:     General: Skin is warm and dry. Capillary Refill: Capillary refill takes less than 2 seconds. Coloration: Skin is not jaundiced or pale. Findings: No erythema or rash. Neurological:      General: No focal deficit present. Mental Status: He is alert and oriented to person, place, and time. Psychiatric:         Mood and Affect: Mood normal.         Behavior: Behavior normal.         Thought Content: Thought content normal.         Judgment: Judgment normal.        Results for orders placed or performed in visit on 08/08/22   AMB POC GLUCOSE BLOOD, BY GLUCOSE MONITORING DEVICE   Result Value Ref Range    Glucose  MG/DL   AMB POC HEMOGLOBIN A1C   Result Value Ref Range    Hemoglobin A1c (POC) 6.7 %       Assessment/Plan:      A1c has gone down from 9.1-6.7, but his glucose before lunch right now is 203. He is not checking his glucose, I really have low expectations on him checking the glucose several times a day. I counseled him and instructed him to check it at least once a day fasting in the morning and to bring glucometer to all his visits.   He says he will check what brand of glucometer he has and will call us back so that I can send his supplies. I will increase the Lantus from 18 to 20 units considering that the A1c is close to goal and glucose is elevated we want to keep it controlled but avoid hypoglycemia. He really cannot tell me if he has had hypoglycemia or not, but he denies any symptoms when I asked them. Check all labs below a week before her next appointment in 3 months. Continues Eliquis, not in need of additional refills at this moment. ICD-10-CM ICD-9-CM    1. Uncontrolled type 2 diabetes mellitus with hyperglycemia (HCC)  E11.65 250.02 AMB POC GLUCOSE BLOOD, BY GLUCOSE MONITORING DEVICE      AMB POC HEMOGLOBIN A1C      Lantus U-100 Insulin 100 unit/mL injection      insulin syringe-needle U-100 (Sure Comfort Insulin Syringe) 1 mL 29 gauge x 1/2\" syrg       DIABETES FOOT EXAM      TSH 3RD GENERATION      MICROALBUMIN, UR, RAND W/ MICROALB/CREAT RATIO      CBC WITH AUTOMATED DIFF      LIPID PANEL      2. Saddle embolus of pulmonary artery without acute cor pulmonale, unspecified chronicity (HCC)  I26.92 415.13       3. Chronic renal failure, stage 3a (HCC)  N18.31 585.3       4. Need for hepatitis C screening test  Z11.59 V73.89 HEPATITIS C AB      5. Callus of toe  L84 700 REFERRAL TO PODIATRY        Orders Placed This Encounter    TSH 3RD GENERATION    MICROALBUMIN, UR, RAND W/ MICROALB/CREAT RATIO    CBC WITH AUTOMATED DIFF    LIPID PANEL    HEPATITIS C AB    [facility]     Referral Priority:   Routine     Referral Type:   Consultation     Referral Reason:   Specialty Services Required     Referred to Provider:   Konrad Mcintosh DPM     Number of Visits Requested:   1    AMB POC GLUCOSE BLOOD, BY GLUCOSE MONITORING DEVICE    AMB POC HEMOGLOBIN A1C     DIABETES FOOT EXAM    Lantus U-100 Insulin 100 unit/mL injection     Sig: Take 20 Units by SubCUTAneous route in the morning for 90 days.      Dispense:  18 mL     Refill:  0    insulin syringe-needle U-100 (Sure Comfort Insulin Syringe) 1 mL 29 gauge x 1/2\" syrg     Si Syringe by Does Not Apply route daily. Dispense:  100 Each     Refill:  3     follow low fat diet, follow low salt diet, routine labs ordered, have labs drawn prior to ROV, call if any problems, bring glucometer to all your visits. There are no Patient Instructions on file for this visit. Follow-up and Dispositions    Return in about 3 months (around 2022) for diabetes, 30 minutes.

## 2022-08-09 ENCOUNTER — TELEPHONE (OUTPATIENT)
Dept: INTERNAL MEDICINE CLINIC | Age: 67
End: 2022-08-09

## 2022-08-09 NOTE — TELEPHONE ENCOUNTER
Patient stated that Dr. Norma Dunbar needed to know the the glucose meter he has [employer]  [id number]

## 2022-08-09 NOTE — PROGRESS NOTES
HISTORY OF PRESENT ILLNESS  Dash Armstrong is a 78 y.o. male. Chief Complaint   Patient presents with    Follow-up    Elevated PSA    Erectile Dysfunction    Renal Cyst    Incontinence     Past Medical History:  PMHx (including negatives):  has a past medical history of Arthritis, Cancer ([facility] Diabetes ([facility] Elevated prostate specific antigen (PSA) (6/25/2020), Thromboembolus ([facility] and Unspecified symptoms and signs involving the genitourinary system (6/25/2020). PSurgHx:  has a past surgical history that includes hx other surgical; hx other surgical; pr prostate biopsy, needle, saturation sampling (06/05/2020); hx prostatectomy (08/10/2020); hx prostate surgery (08/10/2020); and hx heent (Right). PSocHx:  reports that he has never smoked. He has never used smokeless tobacco. He reports that he does not currently use alcohol. He reports that he does not use drugs. Is status post prostatectomy on 8/10/2020. He had a positive bladder neck margin. Lake 8 disease; PT3aN0. PSA became trace detectable on 4/29/2022 at 0.1. It remains 0.1 on 8/1/2022. He is here today in routine follow-up. Chronic Conditions Addressed Today       1. Malignant neoplasm of prostate (HCC)     Overview      Pretreatment PSA 36.8 with 4.3% free. He is s/p biopsy on 6/5/2020. He has Lake's 6 disease in 3/12 cores with up to 70% involvment and Lake's 3+4 disease in 1/12 cores with 80% involvement. He is s/p prostatectomy and PLND on 8/10/2020. Bladder neck margin positive, Lake's 4+4, pT3aN0. PSA undetectable 9/22/2020. He did not have his PSA drawn at his November visit. 4/9/21: undetectable  7/1/21: undetectable. 10/1/21: undetectable. 2/1/22: undetectable. 4/29/22: PSA came back at 0.1.  8/1/22: PSA 0.1. Current Assessment & Plan      High risk prostate cancer s/p prostatectomy 8/10/2020. Trace PSA recurrence 4/29/22 but PSA stable at 0.1 8/1/22.   He is not interested in early adjuvant therapy. Continue to monitor the trends           Relevant Orders     PSA, DIAGNOSTIC (PROSTATE SPECIFIC AG)    2. Increasing PSA level after treatment for prostate cancer - Primary     Overview      He was referred from Dr. Jose Felipe for elevated PSA.  5/14/2020: 36.8 with 4.3% free. 2/10/2020: 36.3  1/2/2020: 29.5  Right prostate firm and enlarged. Diagnosed with Hunter's 6/7 disease on 6/5/2020. He is s/p prostatectomy on 8/10/2020. PSA became detectable at 0.1 on 4/29/22.  8/1/22: 0.1          Current Assessment & Plan      PSA recurrence after prostatectomy. Stable. He desires no treatment at this time. Relevant Orders     PSA, DIAGNOSTIC (PROSTATE SPECIFIC AG)    3. Type 2 diabetes mellitus with diabetic polyneuropathy, without long-term current use of insulin (HCC)     Current Assessment & Plan      HbA1c 6.7%. Taking metformin and on insulin. Follows with PCP. ROS  Patient denies the symptoms of COVID-19 per routine screening guidelines. Physical Exam    ASSESSMENT and PLAN  Diagnoses and all orders for this visit:    1. Increasing PSA level after treatment for prostate cancer  Assessment & Plan:  PSA recurrence after prostatectomy. Stable. He desires no treatment at this time. Orders:  -     PSA, DIAGNOSTIC (PROSTATE SPECIFIC AG); Future    2. Malignant neoplasm of prostate [facility])  Assessment & Plan:  High risk prostate cancer s/p prostatectomy 8/10/2020. Trace PSA recurrence 4/29/22 but PSA stable at 0.1 8/1/22. He is not interested in early adjuvant therapy. Continue to monitor the trends     Orders:  -     PSA, DIAGNOSTIC (PROSTATE SPECIFIC AG); Future    3. Type 2 diabetes mellitus with diabetic polyneuropathy, without long-term current use of insulin (HCC)  Assessment & Plan:  HbA1c 6.7%. Taking metformin and on insulin. Follows with PCP. Follow-up and Dispositions    Return in about 3 months (around 11/23/2022) for PSA prior. Carisa Gudino. may have a reminder for a \"due or due soon\" health maintenance. The patient has been encouraged to contact their primary care provider for follow-up on this health maintenance or other necessary and/or routine health screening.      Thad Little MD

## 2022-08-12 DIAGNOSIS — C61 MALIGNANT NEOPLASM OF PROSTATE (HCC): ICD-10-CM

## 2022-08-12 RX ORDER — METFORMIN HYDROCHLORIDE 1000 MG/1
1000 TABLET ORAL 2 TIMES DAILY WITH MEALS
Qty: 180 TABLET | Refills: 2 | Status: SHIPPED | OUTPATIENT
Start: 2022-08-12 | End: 2023-05-09

## 2022-08-12 RX ORDER — APIXABAN 5 MG/1
5 TABLET, FILM COATED ORAL 2 TIMES DAILY
Qty: 180 TABLET | Refills: 2 | Status: SHIPPED | OUTPATIENT
Start: 2022-08-12 | End: 2023-05-09

## 2022-08-12 RX ORDER — ASPIRIN 81 MG/1
81 TABLET ORAL DAILY
Qty: 90 TABLET | Refills: 2 | Status: SHIPPED | OUTPATIENT
Start: 2022-08-12

## 2022-08-19 DIAGNOSIS — E11.65 UNCONTROLLED TYPE 2 DIABETES MELLITUS WITH HYPERGLYCEMIA (HCC): ICD-10-CM

## 2022-08-19 RX ORDER — METFORMIN HYDROCHLORIDE 1000 MG/1
TABLET ORAL
Qty: 60 TABLET | Refills: 0 | OUTPATIENT
Start: 2022-08-19

## 2022-08-23 ENCOUNTER — OFFICE VISIT (OUTPATIENT)
Dept: UROLOGY | Age: 67
End: 2022-08-23
Payer: MEDICAID

## 2022-08-23 VITALS
HEIGHT: 72 IN | WEIGHT: 167 LBS | DIASTOLIC BLOOD PRESSURE: 88 MMHG | SYSTOLIC BLOOD PRESSURE: 133 MMHG | BODY MASS INDEX: 22.62 KG/M2

## 2022-08-23 DIAGNOSIS — E11.42 TYPE 2 DIABETES MELLITUS WITH DIABETIC POLYNEUROPATHY, WITHOUT LONG-TERM CURRENT USE OF INSULIN (HCC): ICD-10-CM

## 2022-08-23 DIAGNOSIS — C61 MALIGNANT NEOPLASM OF PROSTATE (HCC): ICD-10-CM

## 2022-08-23 DIAGNOSIS — R97.21 INCREASING PSA LEVEL AFTER TREATMENT FOR PROSTATE CANCER: Primary | ICD-10-CM

## 2022-08-23 PROCEDURE — 99214 OFFICE O/P EST MOD 30 MIN: CPT | Performed by: UROLOGY

## 2022-08-23 NOTE — LETTER
8/23/2022    Patient: Kira Henriquez. YOB: 1955   Date of Visit: 8/23/2022     Pola Foley MD  [id number] [facility] [address]  [place] [facility] [address]    Dear Pola Foley MD,      Thank you for referring Mr. Scout Turk to [place] [address] for evaluation. My notes for this consultation are attached. If you have questions, please do not hesitate to call me. I look forward to following your patient along with you.       Sincerely,    Gretchen Wade MD

## 2022-08-23 NOTE — PROGRESS NOTES
Chief Complaint   Patient presents with    Follow-up    Elevated PSA    Erectile Dysfunction    Renal Cyst    Incontinence       1. Have you been to the ER, urgent care clinic since your last visit? Hospitalized since your last visit? No    2. Have you seen or consulted any other health care providers outside of the [address] [place] [address] since your last visit? Include any pap smears or colon screening.  No    Visit Vitals  /88 (BP 1 Location: Left upper arm, BP Patient Position: Sitting, BP Cuff Size: Adult)   Ht 6' (1.829 m)   Wt 167 lb (75.8 kg)   BMI 22.65 kg/m²

## 2022-08-23 NOTE — ASSESSMENT & PLAN NOTE
High risk prostate cancer s/p prostatectomy 8/10/2020. Trace PSA recurrence 4/29/22 but PSA stable at 0.1 8/1/22. He is not interested in early adjuvant therapy.   Continue to monitor the trends

## 2022-09-06 ENCOUNTER — OFFICE VISIT (OUTPATIENT)
Dept: PODIATRY | Age: 67
End: 2022-09-06
Payer: MEDICAID

## 2022-09-06 VITALS
DIASTOLIC BLOOD PRESSURE: 91 MMHG | HEIGHT: 72 IN | BODY MASS INDEX: 22.65 KG/M2 | TEMPERATURE: 96.9 F | OXYGEN SATURATION: 97 % | SYSTOLIC BLOOD PRESSURE: 148 MMHG | HEART RATE: 94 BPM

## 2022-09-06 DIAGNOSIS — E11.42 TYPE 2 DIABETES MELLITUS WITH DIABETIC POLYNEUROPATHY, WITHOUT LONG-TERM CURRENT USE OF INSULIN (HCC): Primary | ICD-10-CM

## 2022-09-06 DIAGNOSIS — L85.3 XEROSIS CUTIS: ICD-10-CM

## 2022-09-06 PROCEDURE — 99213 OFFICE O/P EST LOW 20 MIN: CPT | Performed by: PODIATRIST

## 2022-09-06 PROCEDURE — 1123F ACP DISCUSS/DSCN MKR DOCD: CPT | Performed by: PODIATRIST

## 2022-09-06 PROCEDURE — 3044F HG A1C LEVEL LT 7.0%: CPT | Performed by: PODIATRIST

## 2022-09-06 NOTE — PROGRESS NOTES
1. \"Have you been to the ER, urgent care clinic since your last visit? Hospitalized since your last visit? \" No    2. \"Have you seen or consulted any other health care providers outside of the [address] [place] [address] since your last visit? \" No     3. For patients aged 39-70: Has the patient had a colonoscopy / FIT/ Cologuard? No      If the patient is female:    4. For patients aged 41-77: Has the patient had a mammogram within the past 2 years? NA - based on age or sex      11. For patients aged 21-65: Has the patient had a pap smear?  NA - based on age or sex    Chief Complaint   Patient presents with    Foot Exam

## 2022-10-06 NOTE — PROGRESS NOTES
[facility]    Subjective:         Patient is a 78 y.o. male who is being seen as a returning pt for diabetic lower extremity neuropathy and in need of a diabetic pedal exam.  He states he has continued with the gabapentin 300 mg 3x daily that was prescribed during a previous office visit. He admits to relief of his symptoms. He states he last saw his PCP (Dr. Genevieve Pearson). He states he last saw his PCP on 08/08/2022. He states his last A1c was 6.7% (down greatly from his previous value of 79.1%). He states the cream (ammonium lactate 12%) has helped with his dry skin. He continues to deny any recent trauma. He continues to deny any local/systemic signs infection. He continues to deny any other pedal complaints      Past Medical History:   Diagnosis Date    Arthritis     Cancer ([facility]     Diabetes ([facility] [address]     Elevated prostate specific antigen (PSA) 6/25/2020    He is referred from Dr. Anyi Portillo for elevated PSA. 5/14/2020: 36.8 with 4.3% free. 2/10/2020: 36.3 1/2/2020: 29.5 Right prostate firm and enlarged. 06- visit He has a markedly elevated PSA. He underwent biopsy today, 6/5/20. He is instructed to take his antibiotics as prescribed. He was given post procedure instructions. Thromboembolus ([facility] [address]     Unspecified symptoms and signs involving the genitourinary system 6/25/2020    Elevated PSA with enlarged, firm prostate on exam on 5/14/2020. 06- visit Right prostate enlarged and firm, concerning in light of the elevated PSA.      Past Surgical History:   Procedure Laterality Date    HX HEENT Right     Ear x2 operation    HX OTHER SURGICAL      PROCEDURE ON EAR     HX OTHER SURGICAL      PROCEDURE ON EAR     HX PROSTATE SURGERY  08/10/2020     pelvic lymph node dissection    HX PROSTATECTOMY  08/10/2020    TX PROSTATE BIOPSY, NEEDLE, SATURATION SAMPLING  06/05/2020       Family History   Problem Relation Age of Onset    Diabetes Mother     Cancer Father       Social History     Tobacco Use    Smoking status: Never    Smokeless tobacco: Never   Substance Use Topics    Alcohol use: Not Currently     No Known Allergies  Prior to Admission medications    Medication Sig Start Date End Date Taking? Authorizing Provider   aspirin delayed-release 81 mg tablet Take 1 Tablet by mouth in the morning. 8/12/22   Marlo Sow MD   Eliquis 5 mg tablet Take 1 Tablet by mouth two (2) times a day for 270 days. 8/12/22 5/9/23  Marlo Sow MD   metFORMIN (GLUCOPHAGE) 1,000 mg tablet Take 1 Tablet by mouth two (2) times daily (with meals) for 270 days. 8/12/22 5/9/23  Rosales Deal MD   Lantus U-100 Insulin 100 unit/mL injection Take 20 Units by SubCUTAneous route in the morning for 90 days. 8/8/22 11/6/22  Marlo Sow MD   insulin syringe-needle U-100 (Sure Comfort Insulin Syringe) 1 mL 29 gauge x 1/2\" syrg 1 Syringe by Does Not Apply route daily. 8/8/22   Rosales Deal MD   glucose blood VI test strips (True Metrix Glucose Test Strip) strip Use to check glucose once daily before breakfast 8/8/22   Marlo Sow MD   lancets misc Use to check glucose once daily before breakfast 8/8/22   Marlo Sow MD       Review of Systems   Constitutional: Negative. HENT: Negative. Eyes: Negative. Respiratory: Negative. Cardiovascular: Negative. Endocrine: Negative. Musculoskeletal: Negative. Allergic/Immunologic: Positive for immunocompromised state. Neurological:  Positive for numbness. Hematological: Negative. Psychiatric/Behavioral: Negative. All other systems reviewed and are negative. Objective:     Visit Vitals  BP (!) 148/91 (BP 1 Location: Left upper arm, BP Patient Position: Sitting, BP Cuff Size: Adult)   Pulse 94   Temp 96.9 °F (36.1 °C) (Temporal)   Ht 6' (1.829 m)   SpO2 97%   BMI 22.65 kg/m²       Physical Exam  Vitals reviewed. Constitutional:       Appearance: He is normal weight. Cardiovascular:      Pulses:           Dorsalis pedis pulses are 2+ on the right side and 2+ on the left side. Posterior tibial pulses are 2+ on the right side and 2+ on the left side. Pulmonary:      Effort: Pulmonary effort is normal.   Musculoskeletal:      Right foot: Normal range of motion. No deformity or bunion. Left foot: Normal range of motion. No deformity or bunion. Feet:      Right foot:      Protective Sensation: 10 sites tested. 10 sites sensed. Skin integrity: Dry skin present. Toenail Condition: Right toenails are abnormally thick. Left foot:      Protective Sensation: 10 sites tested. 10 sites sensed. Skin integrity: Dry skin present. Toenail Condition: Left toenails are abnormally thick. Lymphadenopathy:      Lower Body: No right inguinal adenopathy. No left inguinal adenopathy. Skin:     General: Skin is warm. Capillary Refill: Capillary refill takes 2 to 3 seconds. Comments: Absent pedal hair growth with hyperpigmentation to the skin   Neurological:      Mental Status: He is alert and oriented to person, place, and time. Comments: Paresthesias/burning noted to B/L LE   Psychiatric:         Mood and Affect: Mood and affect normal.         Behavior: Behavior is cooperative. Data Review: No results found for this or any previous visit (from the past 24 hour(s)). Impression:       ICD-10-CM ICD-9-CM    1. Type 2 diabetes mellitus with diabetic polyneuropathy, without long-term current use of insulin ([facility])  E11.42 250.60      357.2       2. Xerosis cutis  L85.3 706.8           Recommendation:     Patient seen and evaluated in the office  Discussed and educated patient regarding his/her current medical condition as it pertains to his/her diabetes and his/her overall pedal health.   Instructed patient to monitor his/her feet daily for possible wound formation, be compliant with all medications and wear supportive shoe gear for wound prevention. Reviewed and discussed most recent lab work, specifically as it pertains to his/her diabetes. Pt verbalized understanding of all discussed and reviewed  In-depth conversation had regarding medication management for his diabetic peripheral neuropathy. Patient to continue with the 300 mg of the gabapentin 3 times daily. Instructed patient to monitor his symptoms and dosing changes may be needed in the future. Patient verbalized understanding  Patient to continue with the ammonium lactate 12% lotion to be applied twice daily to affected dry skin          Wilmer Elena, [facility], [address] [place] [address] and Chrisleyda [address] [place] [address] [address], [address] [place] [address]  O: [phone]  F: [phone]  C: [phone]

## 2022-11-07 DIAGNOSIS — E78.5 TYPE 2 DIABETES MELLITUS WITH HYPERLIPIDEMIA (HCC): Primary | ICD-10-CM

## 2022-11-07 DIAGNOSIS — E11.69 TYPE 2 DIABETES MELLITUS WITH HYPERLIPIDEMIA (HCC): Primary | ICD-10-CM

## 2022-11-08 LAB — PSA SERPL-MCNC: 0.2 NG/ML (ref 0–4)

## 2022-11-09 LAB
ALBUMIN/CREAT UR: 4 MG/G CREAT (ref 0–29)
BASOPHILS # BLD AUTO: 0 X10E3/UL (ref 0–0.2)
BASOPHILS NFR BLD AUTO: 1 %
CHOLEST SERPL-MCNC: 205 MG/DL (ref 100–199)
CREAT UR-MCNC: 91.4 MG/DL
EOSINOPHIL # BLD AUTO: 0.1 X10E3/UL (ref 0–0.4)
EOSINOPHIL NFR BLD AUTO: 1 %
ERYTHROCYTE [DISTWIDTH] IN BLOOD BY AUTOMATED COUNT: 13.6 % (ref 11.6–15.4)
HCT VFR BLD AUTO: 42.1 % (ref 37.5–51)
HCV AB S/CO SERPL IA: <0.1 S/CO RATIO (ref 0–0.9)
HDLC SERPL-MCNC: 43 MG/DL
HGB BLD-MCNC: 14.3 G/DL (ref 13–17.7)
IMM GRANULOCYTES # BLD AUTO: 0 X10E3/UL (ref 0–0.1)
IMM GRANULOCYTES NFR BLD AUTO: 0 %
LDLC SERPL CALC-MCNC: 138 MG/DL (ref 0–99)
LYMPHOCYTES # BLD AUTO: 2 X10E3/UL (ref 0.7–3.1)
LYMPHOCYTES NFR BLD AUTO: 49 %
MCH RBC QN AUTO: 30.2 PG (ref 26.6–33)
MCHC RBC AUTO-ENTMCNC: 34 G/DL (ref 31.5–35.7)
MCV RBC AUTO: 89 FL (ref 79–97)
MICROALBUMIN UR-MCNC: 4.1 UG/ML
MONOCYTES # BLD AUTO: 0.3 X10E3/UL (ref 0.1–0.9)
MONOCYTES NFR BLD AUTO: 7 %
NEUTROPHILS # BLD AUTO: 1.8 X10E3/UL (ref 1.4–7)
NEUTROPHILS NFR BLD AUTO: 42 %
PLATELET # BLD AUTO: 303 X10E3/UL (ref 150–450)
RBC # BLD AUTO: 4.73 X10E6/UL (ref 4.14–5.8)
TRIGL SERPL-MCNC: 131 MG/DL (ref 0–149)
TSH SERPL DL<=0.005 MIU/L-ACNC: 2.4 UIU/ML (ref 0.45–4.5)
VLDLC SERPL CALC-MCNC: 24 MG/DL (ref 5–40)
WBC # BLD AUTO: 4.2 X10E3/UL (ref 3.4–10.8)

## 2022-11-18 RX ORDER — ROSUVASTATIN CALCIUM 20 MG/1
20 TABLET, COATED ORAL
Qty: 90 TABLET | Refills: 3 | Status: SHIPPED | OUTPATIENT
Start: 2022-11-18 | End: 2023-11-13

## 2022-11-18 NOTE — PROGRESS NOTES
Please let him know cholesterol is elevated, all other results are normal. Considering elevated cholesterol associated to diabetes, he should take cholesterol lowering medication.  To start rosuvastatin, prescription sent  All other results are normal

## 2022-11-21 ENCOUNTER — OFFICE VISIT (OUTPATIENT)
Dept: INTERNAL MEDICINE CLINIC | Age: 67
End: 2022-11-21
Payer: MEDICAID

## 2022-11-21 VITALS
TEMPERATURE: 98.1 F | WEIGHT: 176.2 LBS | RESPIRATION RATE: 16 BRPM | HEIGHT: 72 IN | SYSTOLIC BLOOD PRESSURE: 134 MMHG | OXYGEN SATURATION: 98 % | HEART RATE: 75 BPM | BODY MASS INDEX: 23.86 KG/M2 | DIASTOLIC BLOOD PRESSURE: 81 MMHG

## 2022-11-21 DIAGNOSIS — Z86.711 HISTORY OF PULMONARY EMBOLISM: ICD-10-CM

## 2022-11-21 DIAGNOSIS — C61 MALIGNANT NEOPLASM OF PROSTATE (HCC): ICD-10-CM

## 2022-11-21 DIAGNOSIS — E78.5 HYPERLIPIDEMIA, UNSPECIFIED HYPERLIPIDEMIA TYPE: ICD-10-CM

## 2022-11-21 DIAGNOSIS — E11.42 TYPE 2 DIABETES MELLITUS WITH DIABETIC POLYNEUROPATHY, WITH LONG-TERM CURRENT USE OF INSULIN (HCC): Primary | ICD-10-CM

## 2022-11-21 DIAGNOSIS — Z79.4 TYPE 2 DIABETES MELLITUS WITH DIABETIC POLYNEUROPATHY, WITH LONG-TERM CURRENT USE OF INSULIN (HCC): Primary | ICD-10-CM

## 2022-11-21 LAB — HBA1C MFR BLD HPLC: 7 %

## 2022-11-21 PROCEDURE — 83036 HEMOGLOBIN GLYCOSYLATED A1C: CPT | Performed by: STUDENT IN AN ORGANIZED HEALTH CARE EDUCATION/TRAINING PROGRAM

## 2022-11-21 PROCEDURE — 99214 OFFICE O/P EST MOD 30 MIN: CPT | Performed by: STUDENT IN AN ORGANIZED HEALTH CARE EDUCATION/TRAINING PROGRAM

## 2022-11-21 RX ORDER — NAPHAZOLINE HYDROCHLORIDE AND POLYETHYLENE GLYCOL 300 .1; 2 MG/ML; MG/ML
1 SOLUTION/ DROPS OPHTHALMIC DAILY
Qty: 100 EACH | Refills: 3 | Status: SHIPPED | OUTPATIENT
Start: 2022-11-21

## 2022-11-21 RX ORDER — GABAPENTIN 300 MG/1
300 CAPSULE ORAL 3 TIMES DAILY
COMMUNITY

## 2022-11-21 RX ORDER — INSULIN GLARGINE 100 [IU]/ML
20 INJECTION, SOLUTION SUBCUTANEOUS
Qty: 5 ML | Refills: 3 | Status: SHIPPED | OUTPATIENT
Start: 2022-11-21

## 2022-11-21 RX ORDER — KETOROLAC TROMETHAMINE 5 MG/ML
1 SOLUTION OPHTHALMIC 4 TIMES DAILY
COMMUNITY

## 2022-11-21 NOTE — PROGRESS NOTES
1. Have you been to the ER, urgent care clinic since your last visit? Hospitalized since your last visit? No    2. Have you seen or consulted any other health care providers outside of the [address] [place] [address] since your last visit? Include any pap smears or colon screening.  No  Chief Complaint   Patient presents with    Establish Care    Diabetes     Visit Vitals  /81 (BP 1 Location: Left upper arm, BP Patient Position: Sitting, BP Cuff Size: Adult)   Pulse 75   Temp 98.1 °F (36.7 °C) (Oral)   Resp 16   Ht 6' (1.829 m)   Wt 176 lb 3.2 oz (79.9 kg)   SpO2 98%   BMI 23.90 kg/m²

## 2022-11-21 NOTE — PROGRESS NOTES
Lili Schrader (: 1955) is a 78 y.o. male, established patient, here for evaluation of the following chief complaint(s):  Establish Care and Diabetes       ASSESSMENT/PLAN:  Below is the assessment and plan developed based on review of pertinent history, physical exam, labs, studies, and medications. 1. Type 2 diabetes mellitus with diabetic polyneuropathy, with long-term current use of insulin ([facility])  -     METABOLIC PANEL, COMPREHENSIVE  -     insulin glargine (LANTUS) 100 unit/mL injection; 20 Units by SubCUTAneous route nightly., Normal, Disp-5 mL, R-3  -     insulin syringe-needle U-100 (Sure Comfort Insulin Syringe) 1 mL 29 gauge x 1/2\" syrg; 1 Syringe by Does Not Apply route daily. , Normal, Disp-100 Each, R-3  -     AMB POC HEMOGLOBIN A1C  2. Malignant neoplasm of prostate ([facility] [address]  3. Hyperlipidemia, unspecified hyperlipidemia type  4. History of pulmonary embolism      He is under the active care of Dr. Az Morales. He has a history of cognitive disability and is a very poor historian. He lives by himself  He does not have any of his medications with him. Diabetes:   Point of care testing done at today's visit. Results for orders placed or performed in visit on 22   AMB POC HEMOGLOBIN A1C   Result Value Ref Range    Hemoglobin A1c (POC) 7.0 %      He supposed to be taking Lantus 20 units, unsure if he is taking this regimen. Will not make any adjustments to his medication regimen for now. He was told to continue taking the Lantus 20 units, refills were prescribed. Continue metformin. Continue monitoring blood sugars. He was told to bring all his medications including his insulin during his next clinic visit. He had recent blood work done, CMP was done. Ordered     Hyperlipidemia: Reviewed recent lipid panel, he was started on rosuvastatin which he states he is taking. History of prostate cancer, s/p prostatectomy with rising PSA levels. He is following with Dr. Calderon Chen for this.   He has an appointment coming up this month. Hx of PE, continue eliquis     Return in about 3 months (around 2/21/2023) for follow up with pcp . SUBJECTIVE/OBJECTIVE:  HANNAH Graham is a 78-year-old who presents to the clinic for follow-up. He has a history of cognitive disability and is a very poor historian. He has a history of diabetes, is currently on Lantus. When asked initially how much she was taking, said 15 units he then said 20 units. He does not have any of his medications with him. He is also taking metformin. He wants refills on his insulin. He states he is checking his bloods sugars, not sure how much of it is accurate. Says its usually in the 130s. He also had blood work done recently in November, he was started on rosuvastatin by his primary care doctor which she states he is taking. Hx prostate cancer s/p prostatectomy. Currently being monitored by urology for increasing PSA. Follows with Dr. Tyree Frankel. He has a history of PE on Eliquis. No Known Allergies  Current Outpatient Medications   Medication Sig    gabapentin (NEURONTIN) 300 mg capsule Take 300 mg by mouth three (3) times daily. insulin glargine (LANTUS) 100 unit/mL injection 20 Units by SubCUTAneous route nightly. insulin syringe-needle U-100 (Sure Comfort Insulin Syringe) 1 mL 29 gauge x 1/2\" syrg 1 Syringe by Does Not Apply route daily. rosuvastatin (CRESTOR) 20 mg tablet Take 1 Tablet by mouth nightly for 360 days. aspirin delayed-release 81 mg tablet Take 1 Tablet by mouth in the morning. Eliquis 5 mg tablet Take 1 Tablet by mouth two (2) times a day for 270 days. metFORMIN (GLUCOPHAGE) 1,000 mg tablet Take 1 Tablet by mouth two (2) times daily (with meals) for 270 days.     glucose blood VI test strips (True Metrix Glucose Test Strip) strip Use to check glucose once daily before breakfast    lancets misc Use to check glucose once daily before breakfast    ketorolac (ACULAR) 0.5 % ophthalmic solution 1 Drop four (4) times daily. (Patient not taking: Reported on 11/21/2022)     No current facility-administered medications for this visit. Past Medical History:   Diagnosis Date    Arthritis     Cancer ([facility] [address]     Diabetes ([facility] [address]     Elevated prostate specific antigen (PSA) 6/25/2020    He is referred from Dr. Britney Larios for elevated PSA. 5/14/2020: 36.8 with 4.3% free. 2/10/2020: 36.3 1/2/2020: 29.5 Right prostate firm and enlarged. 06- visit He has a markedly elevated PSA. He underwent biopsy today, 6/5/20. He is instructed to take his antibiotics as prescribed. He was given post procedure instructions. Thromboembolus ([facility] [address]     Unspecified symptoms and signs involving the genitourinary system 6/25/2020    Elevated PSA with enlarged, firm prostate on exam on 5/14/2020. 06- visit Right prostate enlarged and firm, concerning in light of the elevated PSA. Past Surgical History:   Procedure Laterality Date    HX HEENT Right     Ear x2 operation    HX OTHER SURGICAL      PROCEDURE ON EAR     HX OTHER SURGICAL      PROCEDURE ON EAR     HX PROSTATE SURGERY  08/10/2020     pelvic lymph node dissection    HX PROSTATECTOMY  08/10/2020    OR PROSTATE BIOPSY, NEEDLE, SATURATION SAMPLING  06/05/2020     Family History   Problem Relation Age of Onset    Diabetes Mother     Cancer Father      Social History     Tobacco Use   Smoking Status Never   Smokeless Tobacco Never         Review of Systems   Constitutional:  Negative for fever and malaise/fatigue. HENT:  Negative for congestion, hearing loss and sore throat. Eyes: Negative. Respiratory:  Negative for cough and shortness of breath. Cardiovascular:  Negative for chest pain, palpitations and leg swelling. Gastrointestinal:  Negative for abdominal pain, nausea and vomiting. Genitourinary: Negative. Skin: Negative. Neurological:  Negative for focal weakness, loss of consciousness and headaches.    Psychiatric/Behavioral: Negative. Vitals:    11/21/22 0927   BP: 134/81   Pulse: 75   Resp: 16   Temp: 98.1 °F (36.7 °C)   TempSrc: Oral   SpO2: 98%   Weight: 176 lb 3.2 oz (79.9 kg)   Height: 6' (1.829 m)      Physical Exam  Constitutional:       General: He is not in acute distress. Appearance: Normal appearance. HENT:      Head: Normocephalic. Eyes:      Extraocular Movements: Extraocular movements intact. Conjunctiva/sclera: Conjunctivae normal.      Pupils: Pupils are equal, round, and reactive to light. Cardiovascular:      Rate and Rhythm: Normal rate and regular rhythm. Heart sounds: Normal heart sounds. Pulmonary:      Effort: Pulmonary effort is normal.      Breath sounds: Normal breath sounds. Abdominal:      General: Bowel sounds are normal. There is no distension. Palpations: Abdomen is soft. Tenderness: There is no abdominal tenderness. Musculoskeletal:      Cervical back: Normal range of motion and neck supple. Neurological:      General: No focal deficit present. Mental Status: He is alert. Mental status is at baseline. Motor: No weakness. Psychiatric:         Mood and Affect: Mood normal.         Behavior: Behavior normal.           An electronic signature was used to authenticate this note.   -- Levon Chacon MD

## 2022-11-22 ENCOUNTER — OFFICE VISIT (OUTPATIENT)
Dept: UROLOGY | Age: 67
End: 2022-11-22
Payer: MEDICAID

## 2022-11-22 VITALS
DIASTOLIC BLOOD PRESSURE: 90 MMHG | BODY MASS INDEX: 21.91 KG/M2 | SYSTOLIC BLOOD PRESSURE: 148 MMHG | HEIGHT: 75 IN | HEART RATE: 90 BPM | WEIGHT: 176.2 LBS

## 2022-11-22 DIAGNOSIS — R97.21 INCREASING PSA LEVEL AFTER TREATMENT FOR PROSTATE CANCER: ICD-10-CM

## 2022-11-22 DIAGNOSIS — C61 MALIGNANT NEOPLASM OF PROSTATE (HCC): Primary | ICD-10-CM

## 2022-11-22 LAB
ALBUMIN SERPL-MCNC: 4.7 G/DL (ref 3.8–4.8)
ALBUMIN/GLOB SERPL: 1.4 {RATIO} (ref 1.2–2.2)
ALP SERPL-CCNC: 78 IU/L (ref 44–121)
ALT SERPL-CCNC: 16 IU/L (ref 0–44)
AST SERPL-CCNC: 16 IU/L (ref 0–40)
BILIRUB SERPL-MCNC: 0.7 MG/DL (ref 0–1.2)
BUN SERPL-MCNC: 28 MG/DL (ref 8–27)
BUN/CREAT SERPL: 26 (ref 10–24)
CALCIUM SERPL-MCNC: 9.9 MG/DL (ref 8.6–10.2)
CHLORIDE SERPL-SCNC: 100 MMOL/L (ref 96–106)
CO2 SERPL-SCNC: 21 MMOL/L (ref 20–29)
CREAT SERPL-MCNC: 1.07 MG/DL (ref 0.76–1.27)
EGFR: 77 ML/MIN/1.73
GLOBULIN SER CALC-MCNC: 3.4 G/DL (ref 1.5–4.5)
GLUCOSE SERPL-MCNC: 119 MG/DL (ref 70–99)
POTASSIUM SERPL-SCNC: 4.8 MMOL/L (ref 3.5–5.2)
PROT SERPL-MCNC: 8.1 G/DL (ref 6–8.5)
SODIUM SERPL-SCNC: 139 MMOL/L (ref 134–144)

## 2022-11-22 PROCEDURE — 1123F ACP DISCUSS/DSCN MKR DOCD: CPT | Performed by: UROLOGY

## 2022-11-22 PROCEDURE — 99214 OFFICE O/P EST MOD 30 MIN: CPT | Performed by: UROLOGY

## 2022-11-22 NOTE — ASSESSMENT & PLAN NOTE
PSA recurrence after prostatectomy August 2020. High risk disease with [place]'s 8 disease. Invasive to the bladder neck. He has declined adjuvant therapy and he is content to observe.

## 2022-11-22 NOTE — LETTER
11/22/2022    Patient: Gisell Arguelles. YOB: 1955   Date of Visit: 11/22/2022     Kelli Moore MD  [address] [place] [address]  [address] [facility] [address]    Dear Kelli Moore MD,      Thank you for referring Mr. Beth Tafoya to [place] [address] for evaluation. My notes for this consultation are attached. If you have questions, please do not hesitate to call me. I look forward to following your patient along with you.       Sincerely,    Odalis Jerez MD

## 2022-11-22 NOTE — PROGRESS NOTES
HISTORY OF PRESENT ILLNESS  Analisa Muir is a 78 y.o. male. has a past medical history of Arthritis, Cancer ([facility] [address] Diabetes ([facility] [address] Elevated prostate specific antigen (PSA) (6/25/2020), Thromboembolus ([facility] [address] and Unspecified symptoms and signs involving the genitourinary system (6/25/2020). has a past surgical history that includes hx other surgical; hx other surgical; pr prostate biopsy, needle, saturation sampling (06/05/2020); hx prostatectomy (08/10/2020); hx prostate surgery (08/10/2020); and hx heent (Right). Chief Complaint   Patient presents with    Follow-up    Prostate Cancer    Renal Mass    Kidney Stone     HPI  Chronic Conditions Addressed Today       1. Malignant neoplasm of prostate ([facility] [address] - Primary     Overview      Pretreatment PSA 36.8 with 4.3% free. He is s/p biopsy on 6/5/2020. He has Derrick's 6 disease in 3/12 cores with up to 70% involvment and Derrick's 3+4 disease in 1/12 cores with 80% involvement. He is s/p prostatectomy and PLND on 8/10/2020. Bladder neck margin positive, Mather's 4+4, pT3aN0. PSA undetectable 9/22/2020. He did not have his PSA drawn at his November visit. 4/9/21: undetectable  7/1/21: undetectable. 10/1/21: undetectable. 2/1/22: undetectable. 4/29/22: PSA came back at 0.1.  8/1/22: PSA 0.1.  11/8/2022 PSA was 0.2          Current Assessment & Plan      PSA recurrence after prostatectomy August 2020. High risk disease with Derrick's 8 disease. Invasive to the bladder neck. He has declined adjuvant therapy and he is content to observe. Relevant Orders     PSA, DIAGNOSTIC (PROSTATE SPECIFIC AG)    2. Increasing PSA level after treatment for prostate cancer     Overview      He was referred from Dr. Chaya Elena for elevated PSA.  5/14/2020: 36.8 with 4.3% free. 2/10/2020: 36.3  1/2/2020: 29.5  Right prostate firm and enlarged. Diagnosed with Mather's 6/7 disease on 6/5/2020. He is s/p prostatectomy on 8/10/2020.   PSA became detectable at 0.1 on 4/29/22.  8/1/22: 0.1          Current Assessment & Plan      History of prostatectomy 8/10/2020. PSA became detectable 4/29/2022. He has had a slow rise to date. He does not want treatment. Relevant Orders     PSA, DIAGNOSTIC (PROSTATE SPECIFIC AG)       Past Medical History:    PMHx (including negatives):  has a past medical history of Arthritis, Cancer ([facility] [address] Diabetes ([facility] [address] Elevated prostate specific antigen (PSA) (6/25/2020), Thromboembolus ([facility] [address] and Unspecified symptoms and signs involving the genitourinary system (6/25/2020). PSurgHx:  has a past surgical history that includes hx other surgical; hx other surgical; pr prostate biopsy, needle, saturation sampling (06/05/2020); hx prostatectomy (08/10/2020); hx prostate surgery (08/10/2020); and hx heent (Right). PSocHx:  reports that he has never smoked. He has never used smokeless tobacco. He reports that he does not currently use alcohol. He reports that he does not use drugs. FamilyHX:   Family History   Problem Relation Age of Onset    Diabetes Mother     Cancer Father      ROS  Physical Exam  No Known Allergies  Current Outpatient Medications   Medication Sig Dispense Refill    gabapentin (NEURONTIN) 300 mg capsule Take 300 mg by mouth three (3) times daily. insulin glargine (LANTUS) 100 unit/mL injection 20 Units by SubCUTAneous route nightly. 5 mL 3    insulin syringe-needle U-100 (Sure Comfort Insulin Syringe) 1 mL 29 gauge x 1/2\" syrg 1 Syringe by Does Not Apply route daily. 100 Each 3    rosuvastatin (CRESTOR) 20 mg tablet Take 1 Tablet by mouth nightly for 360 days. 90 Tablet 3    aspirin delayed-release 81 mg tablet Take 1 Tablet by mouth in the morning. 90 Tablet 2    Eliquis 5 mg tablet Take 1 Tablet by mouth two (2) times a day for 270 days. 180 Tablet 2    metFORMIN (GLUCOPHAGE) 1,000 mg tablet Take 1 Tablet by mouth two (2) times daily (with meals) for 270 days.  180 Tablet 2    glucose blood VI test strips (True Metrix Glucose Test Strip) strip Use to check glucose once daily before breakfast 100 Strip 3    lancets misc Use to check glucose once daily before breakfast 100 Each 3    ketorolac (ACULAR) 0.5 % ophthalmic solution 1 Drop four (4) times daily. (Patient not taking: No sig reported)        Results for orders placed or performed in visit on 90/52/05   METABOLIC PANEL, COMPREHENSIVE   Result Value Ref Range    Glucose 119 (H) 70 - 99 mg/dL    BUN 28 (H) 8 - 27 mg/dL    Creatinine 1.07 0.76 - 1.27 mg/dL    eGFR 77 >59 mL/min/1.73    BUN/Creatinine ratio 26 (H) 10 - 24    Sodium 139 134 - 144 mmol/L    Potassium 4.8 3.5 - 5.2 mmol/L    Chloride 100 96 - 106 mmol/L    CO2 21 20 - 29 mmol/L    Calcium 9.9 8.6 - 10.2 mg/dL    Protein, total 8.1 6.0 - 8.5 g/dL    Albumin 4.7 3.8 - 4.8 g/dL    GLOBULIN, TOTAL 3.4 1.5 - 4.5 g/dL    A-G Ratio 1.4 1.2 - 2.2    Bilirubin, total 0.7 0.0 - 1.2 mg/dL    Alk. phosphatase 78 44 - 121 IU/L    AST (SGOT) 16 0 - 40 IU/L    ALT (SGPT) 16 0 - 44 IU/L   AMB POC HEMOGLOBIN A1C   Result Value Ref Range    Hemoglobin A1c (POC) 7.0 %       ASSESSMENT and PLAN  Diagnoses and all orders for this visit:    1. Malignant neoplasm of prostate [facility])  Assessment & Plan:  PSA recurrence after prostatectomy August 2020. High risk disease with [place]'s 8 disease. Invasive to the bladder neck. He has declined adjuvant therapy and he is content to observe. Orders:  -     PSA, DIAGNOSTIC (PROSTATE SPECIFIC AG); Future    2. Increasing PSA level after treatment for prostate cancer  Assessment & Plan:  History of prostatectomy 8/10/2020. PSA became detectable 4/29/2022. He has had a slow rise to date. He does not want treatment. Orders:  -     PSA, DIAGNOSTIC (PROSTATE SPECIFIC AG); Future     Follow-up and Dispositions    Return for PSA prior.        Lorena Dexter MD

## 2022-11-22 NOTE — PROGRESS NOTES
Chief Complaint   Patient presents with    Follow-up    Prostate Cancer    Renal Mass    Kidney Stone       1. Have you been to the ER, urgent care clinic since your last visit? Hospitalized since your last visit? No    2. Have you seen or consulted any other health care providers outside of the [address] [place] [address] since your last visit? Include any pap smears or colon screening.  No    Visit Vitals  BP (!) 148/90 (BP 1 Location: Right upper arm, BP Patient Position: Sitting, BP Cuff Size: Adult)   Pulse 90   Ht 6' 3\" (1.905 m)   Wt 176 lb 3.2 oz (79.9 kg)   BMI 22.02 kg/m²

## 2022-11-22 NOTE — ASSESSMENT & PLAN NOTE
History of prostatectomy 8/10/2020. PSA became detectable 4/29/2022. He has had a slow rise to date. He does not want treatment.

## 2022-11-23 DIAGNOSIS — C61 MALIGNANT NEOPLASM OF PROSTATE (HCC): ICD-10-CM

## 2022-11-23 DIAGNOSIS — R97.21 INCREASING PSA LEVEL AFTER TREATMENT FOR PROSTATE CANCER: ICD-10-CM

## 2023-02-03 DIAGNOSIS — E11.42 TYPE 2 DIABETES MELLITUS WITH DIABETIC POLYNEUROPATHY, WITH LONG-TERM CURRENT USE OF INSULIN (HCC): ICD-10-CM

## 2023-02-03 DIAGNOSIS — Z79.4 TYPE 2 DIABETES MELLITUS WITH DIABETIC POLYNEUROPATHY, WITH LONG-TERM CURRENT USE OF INSULIN (HCC): ICD-10-CM

## 2023-02-04 RX ORDER — INSULIN GLARGINE 100 [IU]/ML
INJECTION, SOLUTION SUBCUTANEOUS
Qty: 10 ML | Refills: 1 | Status: SHIPPED | OUTPATIENT
Start: 2023-02-04

## 2023-02-15 NOTE — PROGRESS NOTES
HISTORY OF PRESENT ILLNESS  Michael Leos is a 78 y.o. male. Chief Complaint   Patient presents with    Elevated PSA    Follow-up     3 month follow-up for psa     Past Medical History:  PMHx (including negatives):  has a past medical history of Arthritis, Cancer ([facility] [address] Diabetes ([facility] [address] Elevated prostate specific antigen (PSA) (6/25/2020), Thromboembolus ([facility] [address] and Unspecified symptoms and signs involving the genitourinary system (6/25/2020). PSurgHx:  has a past surgical history that includes hx other surgical; hx other surgical; pr prostate biopsy, needle, saturation sampling (06/05/2020); hx prostatectomy (08/10/2020); hx prostate surgery (08/10/2020); and hx heent (Right). PSocHx:  reports that he has never smoked. He has never used smokeless tobacco. He reports that he does not currently use alcohol. He reports that he does not use drugs. PSA recurrence after prostatectomy August 2020. High risk disease with [place]'s 8 disease. Invasive to the bladder neck. He has declined adjuvant therapy and he is content to observe. PSA remains at 0.2 as of 2/10/23. No issue with leakage or sexual function. Chronic Conditions Addressed Today       1. Malignant neoplasm of prostate (HCC)     Overview      Pretreatment PSA 36.8 with 4.3% free. He is s/p biopsy on 6/5/2020. He has [place]'s 6 disease in 3/12 cores with up to 70% involvment and [place]'s 3+4 disease in 1/12 cores with 80% involvement. He is s/p prostatectomy and PLND on 8/10/2020. Bladder neck margin positive, [place]'s 4+4, pT3aN0. PSA undetectable 9/22/2020. He did not have his PSA drawn at his November visit. 4/9/21: undetectable  7/1/21: undetectable. 10/1/21: undetectable. 2/1/22: undetectable. 4/29/22: PSA came back at 0.1.  8/1/22: PSA 0.1.  11/8/2022 PSA was 0.2  2/10/23 PSA was 0.2          Current Assessment & Plan      He had a prostatectomy 8/2020. PSA detectable, stable. High risk disease.   He is amenable to adjuvant therapy, ADT. He prefers oral medication. We will start Orgovyx. The patient was instructed on the dosing of the medication and reason for taking it. We discussed the common and serious risks. The patient is advised to be cautious of side effects with a new medication. Relevant Medications     relugolix (Orgovyx) 120 mg tab     relugolix (Orgovyx) 120 mg tab     Other Relevant Orders     PSA, DIAGNOSTIC (PROSTATE SPECIFIC AG)    2. Increasing PSA level after treatment for prostate cancer - Primary     Overview      He was referred from Dr. Charlie Villela for elevated PSA.  5/14/2020: 36.8 with 4.3% free. 2/10/2020: 36.3  1/2/2020: 29.5  Right prostate firm and enlarged. Diagnosed with [place]'s 6/7 disease on 6/5/2020. He is s/p prostatectomy on 8/10/2020. PSA became detectable at 0.1 on 4/29/22.  8/1/22: 0.1  11/7/22: 0.2  2/10/23: 0.2          Relevant Orders     PSA, DIAGNOSTIC (PROSTATE SPECIFIC AG)    3. Urinary incontinence     Overview      He is s/p prostatectomy 8/2020. Educated on kegels and the use of protective undergarments. He had no ongoing issue. Current Assessment & Plan      Resolved         4. Impotence     Overview      He is s/p prostatectomy 8/2020. He is able to function. Current Assessment & Plan       He does not have a partner currently. He is not interested. ROS  Patient denies the symptoms of COVID-19 per routine screening guidelines. Physical Exam    ASSESSMENT and PLAN  Diagnoses and all orders for this visit:    1. Increasing PSA level after treatment for prostate cancer  -     PSA, DIAGNOSTIC (PROSTATE SPECIFIC AG); Future    2. Malignant neoplasm of prostate [facility])  Assessment & Plan:  He had a prostatectomy 8/2020. PSA detectable, stable. High risk disease. He is amenable to adjuvant therapy, ADT. He prefers oral medication. We will start Orgovyx.  The patient was instructed on the dosing of the medication and reason for taking it. We discussed the common and serious risks. The patient is advised to be cautious of side effects with a new medication. Orders:  -     PSA, DIAGNOSTIC (PROSTATE SPECIFIC AG); Future    3. Urinary incontinence, unspecified type  Assessment & Plan:  Resolved      4. Impotence  Assessment & Plan:   He does not have a partner currently. He is not interested. Other orders  -     relugolix (Orgovyx) 120 mg tab; Take 120 mg by mouth daily. -     relugolix (Orgovyx) 120 mg tab; Take 360 mg by mouth once for 1 dose. Start with 360mg the first day, then daily 120mg         Follow-up and Dispositions    Return in about 3 months (around 5/17/2023) for PSA prior. Nela Conteh. may have a reminder for a \"due or due soon\" health maintenance. The patient has been encouraged to contact their primary care provider for follow-up on this health maintenance or other necessary and/or routine health screening. Danielle Vallecillo MD     Please note that portions of this note were completed with Dragon dictation, the computer voice recognition software. Quite often unanticipated grammatical, syntax, homophones, and other interpretive errors are inadvertently transcribed by the computer software. Please disregard these errors and any other errors that may have escaped final proofreading. Thank you.

## 2023-02-17 ENCOUNTER — TELEPHONE (OUTPATIENT)
Dept: UROLOGY | Age: 68
End: 2023-02-17

## 2023-02-17 ENCOUNTER — OFFICE VISIT (OUTPATIENT)
Dept: UROLOGY | Age: 68
End: 2023-02-17
Payer: MEDICAID

## 2023-02-17 VITALS
OXYGEN SATURATION: 99 % | SYSTOLIC BLOOD PRESSURE: 141 MMHG | BODY MASS INDEX: 21.5 KG/M2 | HEART RATE: 71 BPM | DIASTOLIC BLOOD PRESSURE: 91 MMHG | RESPIRATION RATE: 18 BRPM | WEIGHT: 172 LBS | TEMPERATURE: 96.6 F

## 2023-02-17 DIAGNOSIS — C61 MALIGNANT NEOPLASM OF PROSTATE (HCC): ICD-10-CM

## 2023-02-17 DIAGNOSIS — R32 URINARY INCONTINENCE, UNSPECIFIED TYPE: ICD-10-CM

## 2023-02-17 DIAGNOSIS — N52.9 IMPOTENCE: ICD-10-CM

## 2023-02-17 DIAGNOSIS — R97.21 INCREASING PSA LEVEL AFTER TREATMENT FOR PROSTATE CANCER: Primary | ICD-10-CM

## 2023-02-17 PROCEDURE — 99214 OFFICE O/P EST MOD 30 MIN: CPT | Performed by: UROLOGY

## 2023-02-17 PROCEDURE — 1123F ACP DISCUSS/DSCN MKR DOCD: CPT | Performed by: UROLOGY

## 2023-02-17 RX ORDER — RELUGOLIX 120 MG/1
120 TABLET, FILM COATED ORAL DAILY
Qty: 30 EACH | Refills: 5 | Status: SHIPPED | OUTPATIENT
Start: 2023-02-17

## 2023-02-17 RX ORDER — RELUGOLIX 120 MG/1
360 TABLET, FILM COATED ORAL ONCE
Qty: 3 EACH | Refills: 0 | Status: SHIPPED | OUTPATIENT
Start: 2023-02-17 | End: 2023-02-17

## 2023-02-17 NOTE — LETTER
2/17/2023    Patient: Concetta Carbajal. YOB: 1955   Date of Visit: 2/17/2023     Russ Catalan MD  [address] [place] [address]  [address] [facility] [address]    Dear Russ Catalan MD,      Thank you for referring Mr. Denise Segal to [place] [address] for evaluation. My notes for this consultation are attached. If you have questions, please do not hesitate to call me. I look forward to following your patient along with you.       Sincerely,    Jocelyn Cavanaugh MD

## 2023-02-17 NOTE — ASSESSMENT & PLAN NOTE
He had a prostatectomy 8/2020. PSA detectable, stable. High risk disease. He is amenable to adjuvant therapy, ADT. He prefers oral medication. We will start Orgovyx. The patient was instructed on the dosing of the medication and reason for taking it. We discussed the common and serious risks. The patient is advised to be cautious of side effects with a new medication.

## 2023-02-17 NOTE — PROGRESS NOTES
Chief Complaint   Patient presents with    Elevated PSA    Follow-up     3 month follow-up for psa   1. \"Have you been to the ER, urgent care clinic since your last visit? Hospitalized since your last visit? \" No    2. \"Have you seen or consulted any other health care providers outside of the [address] [place] [address] since your last visit? \" No     3. For patients aged 39-70: Has the patient had a colonoscopy? No     Itient had a pap smear?  No    Visit Vitals  BP (!) 141/91 (BP 1 Location: Left upper arm, BP Patient Position: Sitting, BP Cuff Size: Adult)   Pulse 71   Temp (!) 96.6 °F (35.9 °C) (Temporal)   Resp 18   Wt 172 lb (78 kg)   SpO2 99%   BMI 21.50 kg/m²

## 2023-02-17 NOTE — TELEPHONE ENCOUNTER
Chata from [employer] needed clarification on the quantity  of pt medication relugolix (Orgovyx) 120 mg tab and states that the primary ICD code is missing

## 2023-02-20 ENCOUNTER — TELEPHONE (OUTPATIENT)
Dept: UROLOGY | Age: 68
End: 2023-02-20

## 2023-02-20 NOTE — TELEPHONE ENCOUNTER
Spoke with nish [employer] filled out form on Friday but it hasnt been faxed yet. Danielle Metzger had it to fax today.  Included in forms is all info needed

## 2023-02-20 NOTE — TELEPHONE ENCOUNTER
Abdullahi from [employer] called needing additional information for patient's medication, ICD-10 code, his med list and other questions  Please call [phone]

## 2023-02-21 ENCOUNTER — OFFICE VISIT (OUTPATIENT)
Dept: INTERNAL MEDICINE CLINIC | Age: 68
End: 2023-02-21
Payer: MEDICAID

## 2023-02-21 VITALS
OXYGEN SATURATION: 99 % | BODY MASS INDEX: 21.88 KG/M2 | DIASTOLIC BLOOD PRESSURE: 84 MMHG | WEIGHT: 176 LBS | HEART RATE: 76 BPM | HEIGHT: 75 IN | TEMPERATURE: 98.6 F | SYSTOLIC BLOOD PRESSURE: 127 MMHG

## 2023-02-21 DIAGNOSIS — N18.31 CHRONIC RENAL FAILURE, STAGE 3A (HCC): ICD-10-CM

## 2023-02-21 DIAGNOSIS — Z79.4 TYPE 2 DIABETES MELLITUS WITH DIABETIC POLYNEUROPATHY, WITH LONG-TERM CURRENT USE OF INSULIN (HCC): Primary | ICD-10-CM

## 2023-02-21 DIAGNOSIS — E11.42 TYPE 2 DIABETES MELLITUS WITH DIABETIC POLYNEUROPATHY, WITH LONG-TERM CURRENT USE OF INSULIN (HCC): Primary | ICD-10-CM

## 2023-02-21 DIAGNOSIS — Z12.11 COLON CANCER SCREENING: ICD-10-CM

## 2023-02-21 DIAGNOSIS — I26.92 SADDLE EMBOLUS OF PULMONARY ARTERY WITHOUT ACUTE COR PULMONALE, UNSPECIFIED CHRONICITY (HCC): ICD-10-CM

## 2023-02-21 LAB — HBA1C MFR BLD HPLC: 7.3 %

## 2023-02-21 PROCEDURE — 99214 OFFICE O/P EST MOD 30 MIN: CPT | Performed by: INTERNAL MEDICINE

## 2023-02-21 PROCEDURE — 83036 HEMOGLOBIN GLYCOSYLATED A1C: CPT | Performed by: INTERNAL MEDICINE

## 2023-02-21 NOTE — PROGRESS NOTES
Chief Complaint   Patient presents with    Follow Up Chronic Condition     diabetes    Visit Vitals  /84 (BP 1 Location: Left upper arm, BP Patient Position: Sitting, BP Cuff Size: Adult)   Pulse 76   Temp 98.6 °F (37 °C) (Temporal)   Ht 6' 3\" (1.905 m)   Wt 176 lb (79.8 kg) Comment: with coat, scarf   SpO2 99%   BMI 22.00 kg/m²    1. Have you been to the ER, urgent care clinic since your last visit? Hospitalized since your last visit? No    2. Have you seen or consulted any other health care providers outside of the [address] [place] [address] since your last visit? Include any pap smears or colon screening.  No

## 2023-02-21 NOTE — PROGRESS NOTES
Charisse Pringle. is a 78 y.o. male and presents with Follow Up Chronic Condition (diabetes)    DM: He is injecting 20 units Lantus, metformin with no problems. He did not bring glucometer, can't recall his glucose readings, but he sys he checks his glucose every day twice a day before meals. Last A1C was 7. He saw Shireen Rivera for eye exam in July 2022, he says he was told no retinopathy   Denies pedal symptoms  Asymptomatic from the cardiovascular stand point. Review of Systems  Review of Systems   Constitutional:  Negative for chills, fatigue, fever and unexpected weight change. HENT:  Negative for congestion, ear pain, sneezing and sore throat. Eyes:  Negative for pain and discharge. Respiratory:  Negative for cough, shortness of breath and wheezing. Cardiovascular:  Negative for chest pain, palpitations and leg swelling. Gastrointestinal:  Negative for abdominal pain, blood in stool, constipation and diarrhea. Endocrine: Negative for polydipsia and polyuria. Genitourinary:  Negative for difficulty urinating, dysuria, frequency, hematuria and urgency. Musculoskeletal:  Negative for arthralgias, back pain and joint swelling. Skin:  Negative for rash. Allergic/Immunologic: Negative for environmental allergies and food allergies. Neurological:  Negative for dizziness, speech difficulty, weakness, light-headedness, numbness and headaches. Hematological:  Negative for adenopathy. Psychiatric/Behavioral:  Negative for behavioral problems (Depression), sleep disturbance and suicidal ideas. Past Medical History:   Diagnosis Date    Arthritis     Cancer ([facility] [address]     Diabetes ([facility] [address]     Elevated prostate specific antigen (PSA) 6/25/2020    He is referred from Dr. Olan Landau for elevated PSA. 5/14/2020: 36.8 with 4.3% free. 2/10/2020: 36.3 1/2/2020: 29.5 Right prostate firm and enlarged. 06- visit He has a markedly elevated PSA. He underwent biopsy today, 6/5/20.  He is instructed to take his antibiotics as prescribed. He was given post procedure instructions. Thromboembolus ([address]     Unspecified symptoms and signs involving the genitourinary system 6/25/2020    Elevated PSA with enlarged, firm prostate on exam on 5/14/2020. 06- visit Right prostate enlarged and firm, concerning in light of the elevated PSA. Past Surgical History:   Procedure Laterality Date    HX HEENT Right     Ear x2 operation    HX OTHER SURGICAL      PROCEDURE ON EAR     HX OTHER SURGICAL      PROCEDURE ON EAR     HX PROSTATE SURGERY  08/10/2020     pelvic lymph node dissection    HX PROSTATECTOMY  08/10/2020    IA PROSTATE BIOPSY, NEEDLE, SATURATION SAMPLING  06/05/2020     Social History     Socioeconomic History    Marital status: SINGLE   Occupational History    Occupation: RETIRED   Tobacco Use    Smoking status: Never    Smokeless tobacco: Never   Vaping Use    Vaping Use: Never used   Substance and Sexual Activity    Alcohol use: Not Currently    Drug use: Never    Sexual activity: Not Currently     Family History   Problem Relation Age of Onset    Diabetes Mother     Cancer Father      Current Outpatient Medications   Medication Sig Dispense Refill    relugolix (Orgovyx) 120 mg tab Take 120 mg by mouth daily. 30 Each 5    Lantus U-100 Insulin 100 unit/mL injection INJECT 20 UNITS EACH MORNING. 10 mL 1    gabapentin (NEURONTIN) 300 mg capsule Take 300 mg by mouth three (3) times daily. insulin syringe-needle U-100 (Sure Comfort Insulin Syringe) 1 mL 29 gauge x 1/2\" syrg 1 Syringe by Does Not Apply route daily. 100 Each 3    rosuvastatin (CRESTOR) 20 mg tablet Take 1 Tablet by mouth nightly for 360 days. 90 Tablet 3    aspirin delayed-release 81 mg tablet Take 1 Tablet by mouth in the morning. 90 Tablet 2    Eliquis 5 mg tablet Take 1 Tablet by mouth two (2) times a day for 270 days.  180 Tablet 2    metFORMIN (GLUCOPHAGE) 1,000 mg tablet Take 1 Tablet by mouth two (2) times daily (with meals) for 270 days. 180 Tablet 2    glucose blood VI test strips (True Metrix Glucose Test Strip) strip Use to check glucose once daily before breakfast 100 Strip 3    lancets misc Use to check glucose once daily before breakfast 100 Each 3     No Known Allergies    Objective:  Visit Vitals  /84 (BP 1 Location: Left upper arm, BP Patient Position: Sitting, BP Cuff Size: Adult)   Pulse 76   Temp 98.6 °F (37 °C) (Temporal)   Ht 6' 3\" (1.905 m)   Wt 176 lb (79.8 kg) Comment: with coat, scarf   SpO2 99%   BMI 22.00 kg/m²     Physical Exam:   Physical Exam  Constitutional:       General: He is not in acute distress. Appearance: Normal appearance. HENT:      Head: Normocephalic and atraumatic. Mouth/Throat:      Mouth: Mucous membranes are moist.   Eyes:      Extraocular Movements: Extraocular movements intact. Conjunctiva/sclera: Conjunctivae normal.      Pupils: Pupils are equal, round, and reactive to light. Cardiovascular:      Rate and Rhythm: Normal rate and regular rhythm. Pulses: Normal pulses. Heart sounds: Normal heart sounds. Pulmonary:      Effort: Pulmonary effort is normal.      Breath sounds: Normal breath sounds. Abdominal:      General: Abdomen is flat. Bowel sounds are normal. There is no distension. Palpations: Abdomen is soft. There is no mass. Tenderness: There is no abdominal tenderness. Musculoskeletal:         General: No swelling or deformity. Cervical back: Normal range of motion and neck supple. Right lower leg: No edema. Left lower leg: No edema. Lymphadenopathy:      Cervical: No cervical adenopathy. Skin:     General: Skin is warm and dry. Capillary Refill: Capillary refill takes less than 2 seconds. Coloration: Skin is not jaundiced or pale. Findings: No erythema or rash. Neurological:      General: No focal deficit present. Mental Status: He is alert and oriented to person, place, and time.    Psychiatric: Mood and Affect: Mood normal.         Behavior: Behavior normal.         Thought Content: Thought content normal.         Judgment: Judgment normal.        Results for orders placed or performed in visit on 02/21/23   AMB POC HEMOGLOBIN A1C   Result Value Ref Range    Hemoglobin A1c (POC) 7.3 %       Assessment/Plan:    A1C up from 7 to 7.3, increase Lantus to 22 units, continue metformin without changes, asked him again to bring glucometer to all visits. Overall he's doing well. ICD-10-CM ICD-9-CM    1. Type 2 diabetes mellitus with diabetic polyneuropathy, with long-term current use of insulin (HCC)  E11.42 250.60 AMB POC HEMOGLOBIN A1C    Z79.4 357.2      V58.67       2. Saddle embolus of pulmonary artery without acute cor pulmonale, unspecified chronicity (HCC)  I26.92 415.13       3. Chronic renal failure, stage 3a (HCC)  N18.31 585.3       4. Colon cancer screening  Z12.11 V76.51 COLOGUARD TEST (FECAL DNA COLORECTAL CANCER SCREENING)        Orders Placed This Encounter    COLOGUARD TEST (FECAL DNA COLORECTAL CANCER SCREENING)    AMB POC HEMOGLOBIN A1C       follow low fat diet, follow low salt diet, call if any problems, bring glucometer  Patient Instructions   Increase Lantus to 22 units    Follow-up and Dispositions    Return in about 13 weeks (around 5/23/2023) for please schedule him at 10 am. thanks .

## 2023-02-22 DIAGNOSIS — C61 MALIGNANT NEOPLASM OF PROSTATE (HCC): ICD-10-CM

## 2023-02-22 DIAGNOSIS — R97.21 INCREASING PSA LEVEL AFTER TREATMENT FOR PROSTATE CANCER: ICD-10-CM

## 2023-03-18 RX ORDER — ASPIRIN 81 MG/1
TABLET ORAL
Qty: 90 TABLET | Refills: 3 | Status: SHIPPED | OUTPATIENT
Start: 2023-03-18

## 2023-03-23 ENCOUNTER — TELEPHONE (OUTPATIENT)
Dept: UROLOGY | Age: 68
End: 2023-03-23

## 2023-03-23 NOTE — TELEPHONE ENCOUNTER
Called pt back based on voicemail that was left on mrs green voicemail pt said he didn't call and had no idea of voicemail that was left so then I called pharmacy and pharmacy which was [facility] that they got a call from pt insurance [facility] health and they don't cover that medication orgovyx 120 mg and that [facility] takes pts insurance and need us to send an order to that pharmacy so said ok I will have doctor send another order for orgovyx so can you send another order to [facility] because they can fill the scrip for pt please

## 2023-03-24 NOTE — TELEPHONE ENCOUNTER
Called pt back based on voicemail that was left on mrs green voicemail pt said he didn't call and had no idea of voicemail that was left so then I called pharmacy and spoke to Talita Holden [phone] pharmacist at  [facility] pharmacy that they got a call from pt insurance [employer] which is pt current insurance, they don't cover that medication orgovyx 120 mg and that [facility] pharmacy takes pts insurance and need us to send an order to that pharmacy so said ok I will have doctor send another order for orgovyx so can you send another order to [facility] because they can fill the scrip for pt please

## 2023-03-29 DIAGNOSIS — R97.21 INCREASING PSA LEVEL AFTER TREATMENT FOR PROSTATE CANCER: ICD-10-CM

## 2023-03-29 DIAGNOSIS — C61 MALIGNANT NEOPLASM OF PROSTATE (HCC): ICD-10-CM

## 2023-03-29 RX ORDER — RELUGOLIX 120 MG/1
120 TABLET, FILM COATED ORAL DAILY
Qty: 30 EACH | Refills: 5 | Status: SHIPPED | OUTPATIENT
Start: 2023-03-29

## 2023-03-29 RX ORDER — RELUGOLIX 120 MG/1
360 TABLET, FILM COATED ORAL ONCE
Qty: 30 EACH | Refills: 0 | Status: SHIPPED | OUTPATIENT
Start: 2023-03-29 | End: 2023-03-29

## 2023-03-29 RX ORDER — RELUGOLIX 120 MG/1
120 TABLET, FILM COATED ORAL DAILY
Qty: 30 EACH | Refills: 5 | Status: SHIPPED | OUTPATIENT
Start: 2023-03-29 | End: 2023-03-29

## 2023-04-03 PROBLEM — E11.9 TYPE 2 DIABETES MELLITUS WITHOUT COMPLICATION, WITHOUT LONG-TERM CURRENT USE OF INSULIN (HCC): Status: RESOLVED | Noted: 2020-11-03 | Resolved: 2021-07-14

## 2023-04-03 PROBLEM — E11.42 DIABETIC POLYNEUROPATHY ASSOCIATED WITH TYPE 2 DIABETES MELLITUS (HCC): Status: RESOLVED | Noted: 2020-11-03 | Resolved: 2021-07-14

## 2023-04-12 DIAGNOSIS — I82.402 ACUTE DEEP VEIN THROMBOSIS (DVT) OF LEFT LOWER EXTREMITY, UNSPECIFIED VEIN (HCC): ICD-10-CM

## 2023-04-12 DIAGNOSIS — I26.99 ACUTE PULMONARY EMBOLISM, UNSPECIFIED PULMONARY EMBOLISM TYPE, UNSPECIFIED WHETHER ACUTE COR PULMONALE PRESENT (HCC): ICD-10-CM

## 2023-04-12 DIAGNOSIS — E11.65 UNCONTROLLED TYPE 2 DIABETES MELLITUS WITH HYPERGLYCEMIA (HCC): ICD-10-CM

## 2023-04-17 RX ORDER — METFORMIN HYDROCHLORIDE 1000 MG/1
TABLET ORAL
Qty: 180 TABLET | Refills: 3 | Status: SHIPPED | OUTPATIENT
Start: 2023-04-17

## 2023-04-17 RX ORDER — APIXABAN 5 MG/1
TABLET, FILM COATED ORAL
Qty: 180 TABLET | Refills: 1 | Status: SHIPPED | OUTPATIENT
Start: 2023-04-17

## 2023-04-22 DIAGNOSIS — R97.21 INCREASING PSA LEVEL AFTER TREATMENT FOR PROSTATE CANCER: ICD-10-CM

## 2023-04-22 DIAGNOSIS — C61 MALIGNANT NEOPLASM OF PROSTATE (HCC): Primary | ICD-10-CM

## 2023-04-27 ENCOUNTER — OFFICE VISIT (OUTPATIENT)
Dept: PODIATRY | Age: 68
End: 2023-04-27
Payer: MEDICAID

## 2023-04-27 VITALS
RESPIRATION RATE: 16 BRPM | HEIGHT: 75 IN | SYSTOLIC BLOOD PRESSURE: 134 MMHG | DIASTOLIC BLOOD PRESSURE: 70 MMHG | BODY MASS INDEX: 21.88 KG/M2 | WEIGHT: 176 LBS | HEART RATE: 65 BPM

## 2023-04-27 DIAGNOSIS — Z79.4 TYPE 2 DIABETES MELLITUS WITH DIABETIC POLYNEUROPATHY, WITH LONG-TERM CURRENT USE OF INSULIN (HCC): Primary | ICD-10-CM

## 2023-04-27 DIAGNOSIS — E11.42 TYPE 2 DIABETES MELLITUS WITH DIABETIC POLYNEUROPATHY, WITH LONG-TERM CURRENT USE OF INSULIN (HCC): Primary | ICD-10-CM

## 2023-04-27 PROCEDURE — 3051F HG A1C>EQUAL 7.0%<8.0%: CPT | Performed by: PODIATRIST

## 2023-04-27 PROCEDURE — 1123F ACP DISCUSS/DSCN MKR DOCD: CPT | Performed by: PODIATRIST

## 2023-04-27 PROCEDURE — 99213 OFFICE O/P EST LOW 20 MIN: CPT | Performed by: PODIATRIST

## 2023-04-27 NOTE — PROGRESS NOTES
Chief Complaint   Patient presents with    Diabetes     Foot care     Visit Vitals  /70   Pulse 65   Resp 16   Ht 6' 3\" (1.905 m)   Wt 176 lb (79.8 kg)   BMI 22.00 kg/m²     DANYEL Collins     Patient has no objection to blood transfusions.

## 2023-04-27 NOTE — PROGRESS NOTES
Chief Complaint   Patient presents with    Diabetes     Foot care     Visit Vitals  /70   Pulse 65   Resp 16   Ht 6' 3\" (1.905 m)   Wt 176 lb (79.8 kg)   BMI 22.00 kg/m²     DANYEL Correia

## 2023-05-11 ENCOUNTER — TELEPHONE (OUTPATIENT)
Age: 68
End: 2023-05-11

## 2023-05-11 PROBLEM — R32 URINARY INCONTINENCE: Status: ACTIVE | Noted: 2020-08-17

## 2023-05-11 PROBLEM — R97.21 INCREASING PSA LEVEL AFTER TREATMENT FOR PROSTATE CANCER: Status: ACTIVE | Noted: 2020-06-25

## 2023-05-11 PROBLEM — C61 MALIGNANT NEOPLASM OF PROSTATE (HCC): Status: ACTIVE | Noted: 2020-06-17

## 2023-05-15 DIAGNOSIS — C61 MALIGNANT NEOPLASM OF PROSTATE (HCC): ICD-10-CM

## 2023-05-15 DIAGNOSIS — R97.21 INCREASING PSA LEVEL AFTER TREATMENT FOR PROSTATE CANCER: ICD-10-CM

## 2023-05-17 ENCOUNTER — TELEPHONE (OUTPATIENT)
Age: 68
End: 2023-05-17

## 2023-05-17 NOTE — TELEPHONE ENCOUNTER
Called pt to get blood work done pt states he was on his way to our office to get blood drawn he says hes always got it drawn here I said ok and call closed

## 2023-05-18 LAB — PSA SERPL-MCNC: <0.1 NG/ML (ref 0–4)

## 2023-05-18 RX ORDER — ASPIRIN 81 MG/1
1 TABLET ORAL EVERY MORNING
COMMUNITY
Start: 2023-03-18 | End: 2023-07-07

## 2023-05-18 RX ORDER — RELUGOLIX 120 MG/1
120 TABLET, FILM COATED ORAL DAILY
COMMUNITY
Start: 2023-03-29

## 2023-05-18 RX ORDER — ROSUVASTATIN CALCIUM 20 MG/1
20 TABLET, COATED ORAL
COMMUNITY
Start: 2022-11-18 | End: 2023-11-13

## 2023-05-18 RX ORDER — GABAPENTIN 300 MG/1
300 CAPSULE ORAL 3 TIMES DAILY
COMMUNITY

## 2023-05-18 RX ORDER — INSULIN GLARGINE 100 [IU]/ML
INJECTION, SOLUTION SUBCUTANEOUS
COMMUNITY
Start: 2023-02-04 | End: 2023-05-23 | Stop reason: SDUPTHER

## 2023-05-18 RX ORDER — LANCETS 30 GAUGE
EACH MISCELLANEOUS
COMMUNITY
Start: 2022-08-08

## 2023-05-18 NOTE — RESULT ENCOUNTER NOTE
Patient's test were reviewed. Patient has upcoming appointment in the plan is to discuss it at that time.

## 2023-05-23 ENCOUNTER — OFFICE VISIT (OUTPATIENT)
Age: 68
End: 2023-05-23
Payer: MEDICAID

## 2023-05-23 ENCOUNTER — OFFICE VISIT (OUTPATIENT)
Facility: CLINIC | Age: 68
End: 2023-05-23
Payer: MEDICAID

## 2023-05-23 ENCOUNTER — TELEPHONE (OUTPATIENT)
Facility: CLINIC | Age: 68
End: 2023-05-23

## 2023-05-23 VITALS
DIASTOLIC BLOOD PRESSURE: 81 MMHG | TEMPERATURE: 97.8 F | OXYGEN SATURATION: 100 % | HEIGHT: 75 IN | BODY MASS INDEX: 22.01 KG/M2 | SYSTOLIC BLOOD PRESSURE: 132 MMHG | HEART RATE: 68 BPM | WEIGHT: 177 LBS

## 2023-05-23 VITALS
SYSTOLIC BLOOD PRESSURE: 132 MMHG | OXYGEN SATURATION: 99 % | HEART RATE: 66 BPM | HEIGHT: 75 IN | WEIGHT: 177.8 LBS | TEMPERATURE: 97.3 F | DIASTOLIC BLOOD PRESSURE: 88 MMHG | BODY MASS INDEX: 22.11 KG/M2

## 2023-05-23 DIAGNOSIS — R97.21 INCREASING PSA LEVEL AFTER TREATMENT FOR PROSTATE CANCER: Primary | ICD-10-CM

## 2023-05-23 DIAGNOSIS — E11.42 TYPE 2 DIABETES MELLITUS WITH DIABETIC POLYNEUROPATHY, WITH LONG-TERM CURRENT USE OF INSULIN (HCC): ICD-10-CM

## 2023-05-23 DIAGNOSIS — R32 URINARY INCONTINENCE, UNSPECIFIED TYPE: ICD-10-CM

## 2023-05-23 DIAGNOSIS — C61 MALIGNANT NEOPLASM OF PROSTATE (HCC): ICD-10-CM

## 2023-05-23 DIAGNOSIS — Z79.4 TYPE 2 DIABETES MELLITUS WITH HYPERGLYCEMIA, WITH LONG-TERM CURRENT USE OF INSULIN (HCC): Primary | ICD-10-CM

## 2023-05-23 DIAGNOSIS — Z79.4 TYPE 2 DIABETES MELLITUS WITH DIABETIC POLYNEUROPATHY, WITH LONG-TERM CURRENT USE OF INSULIN (HCC): ICD-10-CM

## 2023-05-23 DIAGNOSIS — E11.65 TYPE 2 DIABETES MELLITUS WITH HYPERGLYCEMIA, WITH LONG-TERM CURRENT USE OF INSULIN (HCC): Primary | ICD-10-CM

## 2023-05-23 DIAGNOSIS — I26.92 SADDLE EMBOLUS OF PULMONARY ARTERY WITHOUT ACUTE COR PULMONALE, UNSPECIFIED CHRONICITY (HCC): ICD-10-CM

## 2023-05-23 LAB — HBA1C MFR BLD: 8.9 %

## 2023-05-23 PROCEDURE — 1123F ACP DISCUSS/DSCN MKR DOCD: CPT | Performed by: INTERNAL MEDICINE

## 2023-05-23 PROCEDURE — 83036 HEMOGLOBIN GLYCOSYLATED A1C: CPT | Performed by: INTERNAL MEDICINE

## 2023-05-23 PROCEDURE — 1123F ACP DISCUSS/DSCN MKR DOCD: CPT | Performed by: UROLOGY

## 2023-05-23 PROCEDURE — 99214 OFFICE O/P EST MOD 30 MIN: CPT | Performed by: INTERNAL MEDICINE

## 2023-05-23 PROCEDURE — 99214 OFFICE O/P EST MOD 30 MIN: CPT | Performed by: UROLOGY

## 2023-05-23 RX ORDER — BLOOD-GLUCOSE SENSOR
EACH MISCELLANEOUS
Qty: 2 EACH | Refills: 11 | Status: SHIPPED | OUTPATIENT
Start: 2023-05-23

## 2023-05-23 RX ORDER — FLASH GLUCOSE SENSOR
KIT MISCELLANEOUS
Qty: 1 EACH | Refills: 1 | Status: SHIPPED | OUTPATIENT
Start: 2023-05-23

## 2023-05-23 RX ORDER — SYRINGE AND NEEDLE,INSULIN,1ML 29 G X1/2"
SYRINGE, EMPTY DISPOSABLE MISCELLANEOUS
COMMUNITY
Start: 2023-05-21

## 2023-05-23 RX ORDER — INSULIN GLARGINE 100 [IU]/ML
28 INJECTION, SOLUTION SUBCUTANEOUS NIGHTLY
Qty: 10 ML | Refills: 4 | Status: SHIPPED | OUTPATIENT
Start: 2023-05-23 | End: 2023-11-19

## 2023-05-23 ASSESSMENT — ENCOUNTER SYMPTOMS
RESPIRATORY NEGATIVE: 1
GASTROINTESTINAL NEGATIVE: 1

## 2023-05-23 NOTE — PROGRESS NOTES
/88 (Site: Right Upper Arm, Position: Sitting, Cuff Size: Medium Adult)   Pulse 66   Temp 97.3 °F (36.3 °C) (Temporal)   Ht 6' 3\" (1.905 m)   Wt 177 lb 12.8 oz (80.6 kg)   SpO2 99%   BMI 22.22 kg/m²    Chief Complaint   Patient presents with    Diabetes   1. \"Have you been to the ER, urgent care clinic since your last visit? Hospitalized since your last visit? \" No    2. \"Have you seen or consulted any other health care providers outside of the [address] [place] [address] since your last visit? \" No     3. For patients aged 39-70: Has the patient had a colonoscopy / FIT/ Cologuard? Yes - Care Gap present. Rooming MA/LPN to request most recent results      If the patient is female:    4. For patients aged 41-77: Has the patient had a mammogram within the past 2 years? NA - based on age or sex      11. For patients aged 21-65: Has the patient had a pap smear? NA - based on age or sex1. \"Have you been to the ER, urgent care clinic since your last visit? Hospitalized since your last visit? \" No    2. \"Have you seen or consulted any other health care providers outside of the [address] [place] [address] since your last visit? \" No     3. For patients aged 39-70: Has the patient had a colonoscopy / FIT/ Cologuard? Yes - Care Gap present. Rooming MA/LPN to request most recent results      If the patient is female:    4. For patients aged 41-77: Has the patient had a mammogram within the past 2 years? NA - based on age or sex      11. For patients aged 21-65: Has the patient had a pap smear?  NA - based on age or sex

## 2023-05-23 NOTE — PROGRESS NOTES
Chief Complaint   Patient presents with    Follow-up    Prostate Cancer    Elevated PSA    Erectile Dysfunction     1. Have you been to the ER, urgent care clinic since your last visit? Hospitalized since your last visit? No    2. Have you seen or consulted any other health care providers outside of the [address] [place] [address] since your last visit? Include any pap smears or colon screening.  No  /81 (Site: Right Upper Arm, Position: Sitting, Cuff Size: Medium Adult)   Pulse 68   Temp 97.8 °F (36.6 °C) (Temporal)   Ht 6' 3\" (1.905 m)   Wt 177 lb (80.3 kg)   SpO2 100%   BMI 22.12 kg/m²

## 2023-05-23 NOTE — PROGRESS NOTES
Poonam Rangel is a 80 y.o. male and presents with Diabetes    He's here for kevin snell on DM, last visit we increase Lantus to 22 units, A1C went slightly up from 7 to 7. 3. he has associated neuropathy and hyperlipidemia, taking his medications with no problems. He says he is not checking his glucose and he is not worried about it. He says he feels well. He has appointment for eye exam scheduled in July Dr. Estelita Evans     Under the care of Urology for prostate cancer, has upcoming appointment with him     On eliquis lifelong for prior saddle PE, no bleeding     Labs from February wnl    Review of Systems  Review of Systems   Constitutional: Negative. HENT: Negative. Respiratory: Negative. Cardiovascular: Negative. Gastrointestinal: Negative. Genitourinary: Negative. Musculoskeletal: Negative. Skin: Negative. Neurological: Negative. Psychiatric/Behavioral: Negative. Past Medical History:   Diagnosis Date    Arthritis     Cancer ([facility] [address]     Diabetes ([facility] [address]     Elevated prostate specific antigen (PSA) 6/25/2020    He is referred from Dr. Jesse Eldridge for elevated PSA. 5/14/2020: 36.8 with 4.3% free. 2/10/2020: 36.3 1/2/2020: 29.5 Right prostate firm and enlarged. 06- visit He has a markedly elevated PSA. He underwent biopsy today, 6/5/20. He is instructed to take his antibiotics as prescribed. He was given post procedure instructions. Thromboembolus ([facility] [address]     Unspecified symptoms and signs involving the genitourinary system 6/25/2020    Elevated PSA with enlarged, firm prostate on exam on 5/14/2020. 06- visit Right prostate enlarged and firm, concerning in light of the elevated PSA.      Past Surgical History:   Procedure Laterality Date    HEENT Right     Ear x2 operation    OTHER SURGICAL HISTORY      PROCEDURE ON EAR     OTHER SURGICAL HISTORY      PROCEDURE ON EAR     PROSTATE BIOPSY, NEEDLE, SATURATION SAMPLING  06/05/2020    PROSTATE SURGERY  08/10/2020     pelvic

## 2023-05-23 NOTE — PROGRESS NOTES
HISTORY OF PRESENT ILLNESS  Gabby St is a 80 y.o. male. has a past medical history of Arthritis, Cancer ([facility] [address] Diabetes ([facility] [address] Elevated prostate specific antigen (PSA), Thromboembolus ([address] and Unspecified symptoms and signs involving the genitourinary system. has a past surgical history that includes other surgical history; heent (Right); other surgical history; Prostatectomy (08/10/2020); prostate biopsy, needle, saturation sampling (06/05/2020); and Prostate surgery (08/10/2020). Chief Complaint   Patient presents with    Follow-up    Prostate Cancer    Elevated PSA    Erectile Dysfunction     On Orgovyx since 2/2023. PSA is now controlled. Elevated PSA    Erectile Dysfunction    1. Increasing PSA level after treatment for prostate cancer  Overview:  He is s/p prostatectomy on 8/10/2020. PSA became detectable at 0.1 on 4/29/22.  8/1/22: 0.1  11/7/22: 0.2  2/10/23: 0.2    Started on orgovyx in Feb, 2023. Orders:  -     PSA, Diagnostic; Future  2. Malignant neoplasm of prostate ([facility] [address]  Overview:  Pretreatment PSA 36.8 with 4.3% free. He is s/p biopsy on 6/5/2020. He has Yoni's 6 disease in 3/12 cores with up to 70% involvment and [place]'s 3+4 disease in 1/12 cores with 80% involvement. He is s/p prostatectomy and PLND on 8/10/2020. Bladder neck margin positive, [place]'s 4+4, pT3aN0. PSA had been undetectable until 4/29/22 (0.1).    8/1/22: PSA 0.1.  11/8/2022 PSA was 0.2  2/10/23 PSA was 0.2    Started on Orgovyx, February 2023. PSA went to undetectable. Assessment & Plan:  PSA went to undetectable on Orgovyx. Repeat in 3 months with follow up after. 3. Urinary incontinence, unspecified type  Overview:  He is s/p prostatectomy 8/2020. Educated on kegels and the use of protective undergarments. He had no ongoing issue. Assessment & Plan:  Good urine control.   Resolved        Past Medical History:    PMHx (including negatives):  has a past medical history of Arthritis,

## 2023-05-30 ENCOUNTER — TELEPHONE (OUTPATIENT)
Facility: CLINIC | Age: 68
End: 2023-05-30

## 2023-05-30 NOTE — TELEPHONE ENCOUNTER
----- Message from Artemio Chaudhari MD sent at 5/11/2023  1:40 PM EDT -----  Negative cologuaadriano, next due in 3 years

## 2023-06-20 ENCOUNTER — TELEPHONE (OUTPATIENT)
Facility: CLINIC | Age: 68
End: 2023-06-20

## 2023-06-20 ENCOUNTER — NURSE ONLY (OUTPATIENT)
Age: 68
End: 2023-06-20
Payer: MEDICAID

## 2023-06-20 DIAGNOSIS — Z79.4 TYPE 2 DIABETES MELLITUS WITH DIABETIC POLYNEUROPATHY, WITH LONG-TERM CURRENT USE OF INSULIN (HCC): Primary | ICD-10-CM

## 2023-06-20 DIAGNOSIS — E11.42 TYPE 2 DIABETES MELLITUS WITH DIABETIC POLYNEUROPATHY, WITH LONG-TERM CURRENT USE OF INSULIN (HCC): Primary | ICD-10-CM

## 2023-06-20 PROCEDURE — G0108 DIAB MANAGE TRN  PER INDIV: HCPCS | Performed by: DIETITIAN, REGISTERED

## 2023-06-20 NOTE — PROGRESS NOTES
change: Contemplation       Healthy Coping   Current state  Diabetes Skills, Confidence and Preparedness Index: Total score: 5.7  Skills: 5.1  Confidence: 6.6  Preparedness: 5.7   Would benefit from DSMES related to Healthy Coping: Yes      Identifies specific people, organizations,etc, that actively support their diabetes self-care efforts: Yes      Stage of change: Action     Reducing Risks  Current state  Vaccines:  Influenza: [unfilled]    Pneumococcal: [unfilled]     Hepatitis: [unfilled]    Examinations:  [unfilled]     Dental exam: DUE    Foot exam: Last appointment was: last month    Heart Protection:  BP Readings from Last 2 Encounters:   05/23/23 132/81   05/23/23 132/88        No results found for: LDL, LDLC     Kidney Protection:  No results found for: MCACR, MCA2, MCAU, MCAU2     Would benefit from [facility] related to Reducing Risks: Yes      Actively participates in decision-making with provider regarding secondary prevention:  Yes      Stage of change: Preparation   Problem Solving  Current state  Hypoglycemia Management:  What are signs and symptoms of hypoglycemia that you experience? Pt reported being unaware of s/s of hypoglycemia    How do you prevent hypoglycemia? Pt reported being unaware of how to prevent hypoglycemia    How do you treat hypoglycemia? Pt reported being unaware of how to treat hypoglycemia    Hyperglycemia Management:  What are signs and symptoms of hyperglycemia that you experience? Head feels funny and his feet feel wobbly    How can you prevent hyperglycemia? Pt reported being unaware of how to prevent hyperglycemia    Sick Day Management:  What do you do differently on sick days? Pt reported being unaware of self-management on sick days    Pattern Management:  Do you notice blood glucose patterns when you look at the readings in your meter or logbook? No    How do you use the blood glucose readings from your meter or logbook?  Pt reported being unaware of

## 2023-06-30 ENCOUNTER — NURSE ONLY (OUTPATIENT)
Age: 68
End: 2023-06-30

## 2023-06-30 DIAGNOSIS — Z71.89 ENCOUNTER FOR DIABETES EDUCATION: Primary | ICD-10-CM

## 2023-06-30 DIAGNOSIS — E11.42 TYPE 2 DIABETES MELLITUS WITH DIABETIC POLYNEUROPATHY, WITH LONG-TERM CURRENT USE OF INSULIN (HCC): ICD-10-CM

## 2023-06-30 DIAGNOSIS — Z79.4 TYPE 2 DIABETES MELLITUS WITH DIABETIC POLYNEUROPATHY, WITH LONG-TERM CURRENT USE OF INSULIN (HCC): ICD-10-CM

## 2023-07-07 RX ORDER — ASPIRIN 81 MG/1
TABLET, COATED ORAL
Qty: 90 TABLET | Refills: 2 | Status: SHIPPED | OUTPATIENT
Start: 2023-07-07

## 2023-07-13 ENCOUNTER — NURSE ONLY (OUTPATIENT)
Age: 68
End: 2023-07-13

## 2023-07-13 DIAGNOSIS — E11.42 TYPE 2 DIABETES MELLITUS WITH DIABETIC POLYNEUROPATHY, WITH LONG-TERM CURRENT USE OF INSULIN (HCC): Primary | ICD-10-CM

## 2023-07-13 DIAGNOSIS — Z71.89 ENCOUNTER FOR DIABETES EDUCATION: ICD-10-CM

## 2023-07-13 DIAGNOSIS — Z79.4 TYPE 2 DIABETES MELLITUS WITH DIABETIC POLYNEUROPATHY, WITH LONG-TERM CURRENT USE OF INSULIN (HCC): Primary | ICD-10-CM

## 2023-07-13 NOTE — PROGRESS NOTES
HOW DO I STAY HEALTHY? Prevention   Vaccinations   Preconception care (if applicable)  Examinations   Eye    Foot   Diabetic complications' prevention   Dental health   Heart health   Kidney Health   Nerve health   Sleep health      The participant has a personal diabetes care record to keep abreast of diabetes health Yes     The participant needs to address obtain annual dilated eye exam and inquire about recommended vaccines. Eduar Sanchez RD on 7/13/2023 at 3:26 PM    I have personally reviewed the health record, including provider notes, laboratory data and current medications before making these care and education recommendations. The time spent in this effort is included in the total time.   Total minutes: 60

## 2023-07-20 ENCOUNTER — NURSE ONLY (OUTPATIENT)
Age: 68
End: 2023-07-20
Payer: MEDICAID

## 2023-07-20 DIAGNOSIS — E11.65 TYPE 2 DIABETES MELLITUS WITH HYPERGLYCEMIA, WITH LONG-TERM CURRENT USE OF INSULIN (HCC): ICD-10-CM

## 2023-07-20 DIAGNOSIS — Z79.4 TYPE 2 DIABETES MELLITUS WITH HYPERGLYCEMIA, WITH LONG-TERM CURRENT USE OF INSULIN (HCC): ICD-10-CM

## 2023-07-20 DIAGNOSIS — E11.42 TYPE 2 DIABETES MELLITUS WITH DIABETIC POLYNEUROPATHY, WITH LONG-TERM CURRENT USE OF INSULIN (HCC): Primary | ICD-10-CM

## 2023-07-20 DIAGNOSIS — Z79.4 TYPE 2 DIABETES MELLITUS WITH DIABETIC POLYNEUROPATHY, WITH LONG-TERM CURRENT USE OF INSULIN (HCC): Primary | ICD-10-CM

## 2023-07-20 PROCEDURE — G0108 DIAB MANAGE TRN  PER INDIV: HCPCS | Performed by: DIETITIAN, REGISTERED

## 2023-07-20 NOTE — PROGRESS NOTES
[employer] Program for Diabetes Health  Diabetes Self-Management Education & Support Program  Encounter Note      SUMMARY  Diabetes self-care management training was completed related to healthy eating. The participant will return to continue DSMES related to monitoring. The participant did identify SMART Goal(s) and will practice knowledge and skills related to reducing risks and healthy eating to improve diabetes self-management. EVALUATION:  Sometimes he will skip breakfast. He watches the news in the AM and then he may eat breakfast depending on his mood. He completed a personal Healthy plate template    RECOMMENDATIONS:  Use Healthy Plate and count ~51-67 grams of CHO per meal  Eat breakfast at least 4 days per weeks     TOPICS DISCUSSED TODAY:  HOW DO I STAY HEALTHY? 60      Next provider visit is scheduled for 8/23/23 Dr. Naye Carter GOAL(S)  Practice building a balanced meal for Lunch and dinner  at least 4 times over the next week. ACHIEVEMENT OF GOAL(S) : 0-24%    2. Call [facility] and set up dilated eye exam c Dr. Angeles Darden GOAL(S) :  %       DATE DSMES TOPIC EVALUATION     7/20/2023 WHAT CAN I EAT? General principles   Determining a healthy weight   Nutritional terms & tools   Healthy Plate method   Carbohydrate Counting   Reading food labels   Free apps   Pregnancy recommendations      The participant   Uses Healthy Plate principles in constructing meals: Yes  Reads food labels in choosing acceptable foods: Yes    The participant needs to address use the template to design meals. Ulices Campbell RD on 7/20/2023 at 3:54 PM    I have personally reviewed the health record, including provider notes, laboratory data and current medications before making these care and education recommendations. The time spent in this effort is included in the total time.   Total minutes: 120

## 2023-08-04 ENCOUNTER — NURSE ONLY (OUTPATIENT)
Age: 68
End: 2023-08-04

## 2023-08-04 DIAGNOSIS — Z79.4 TYPE 2 DIABETES MELLITUS WITH DIABETIC POLYNEUROPATHY, WITH LONG-TERM CURRENT USE OF INSULIN (HCC): Primary | ICD-10-CM

## 2023-08-04 DIAGNOSIS — E11.42 TYPE 2 DIABETES MELLITUS WITH DIABETIC POLYNEUROPATHY, WITH LONG-TERM CURRENT USE OF INSULIN (HCC): Primary | ICD-10-CM

## 2023-08-04 NOTE — PROGRESS NOTES
[facility] Program for Diabetes Health  Diabetes Self-Management Education & Support Program  Encounter Note      SUMMARY  Diabetes self-care management training was completed related to monitoring. The participant will return on August 23 to continue DSMES related to taking medications. The participant did identify SMART Goal(s) and will practice knowledge and skills related to reducing risks and healthy eating and monitoring to improve diabetes self-management. EVALUATION:  FS was 145 mg/dL. He ate french fries, did not eat the hamburger; he drank some coke at approximately 12:30 PM. Mr. Cory Shankar was very excited to see that his BG was below 180 mg/dL. He admitted to not monitoring and shared that he still cannot find all of the supplies necessary to monitor. He was given a Sample Verio Reflect glucometer, pen device c lancets and 25 blood glucose monitoring strips. He was shown how to monitor BG c proper technique. RECOMMENDATIONS:  Monitor BG at least 3 days per week: FBG every M/W/F  Log BG numbers   Eat balanced meals     TOPICS DISCUSSED TODAY:  HOW CAN BLOOD GLUCOSE MONITORING HELP ME? 60      Next provider visit is scheduled for 8/23/2023 c Dr Mandy Pineda GOAL(S)  Log blood glucose readings 3  days over the next week. ACHIEVEMENT OF GOAL(S) : 0-24%         DATE DSMES TOPIC EVALUATION     8/4/2023 HOW CAN BLOOD GLUCOSE MONITORING HELP ME?    Value of blood glucose monitoring   Realistic expectations   Blood glucose monitoring targets   Target adjustments   Setting a1c & blood glucose targets with provider   Meter selection    Technique for obtaining blood droplet   Blood glucose testing sites   Determining best times to test   Pregnancy recommendations   Data sharing with provider        The participant   Can demonstrate their glucometer procedure: Yes  Logs their BG readings:  No    The participant needs to address monitoring FBG at lease 3 days per week to establish a

## 2023-08-10 RX ORDER — SYRINGE AND NEEDLE,INSULIN,1ML 29 G X1/2"
SYRINGE, EMPTY DISPOSABLE MISCELLANEOUS
Qty: 30 EACH | Refills: 0 | Status: SHIPPED | OUTPATIENT
Start: 2023-08-10

## 2023-08-19 LAB — PSA SERPL-MCNC: <0.1 NG/ML (ref 0–4)

## 2023-08-23 ENCOUNTER — OFFICE VISIT (OUTPATIENT)
Facility: CLINIC | Age: 68
End: 2023-08-23
Payer: MEDICAID

## 2023-08-23 ENCOUNTER — NURSE ONLY (OUTPATIENT)
Age: 68
End: 2023-08-23

## 2023-08-23 VITALS
OXYGEN SATURATION: 97 % | SYSTOLIC BLOOD PRESSURE: 131 MMHG | BODY MASS INDEX: 24.14 KG/M2 | TEMPERATURE: 98.2 F | HEART RATE: 72 BPM | DIASTOLIC BLOOD PRESSURE: 84 MMHG | WEIGHT: 178.2 LBS | HEIGHT: 72 IN | RESPIRATION RATE: 18 BRPM

## 2023-08-23 DIAGNOSIS — E11.65 TYPE 2 DIABETES MELLITUS WITH HYPERGLYCEMIA, WITH LONG-TERM CURRENT USE OF INSULIN (HCC): Primary | ICD-10-CM

## 2023-08-23 DIAGNOSIS — R97.21 INCREASING PSA LEVEL AFTER TREATMENT FOR PROSTATE CANCER: ICD-10-CM

## 2023-08-23 DIAGNOSIS — Z79.4 TYPE 2 DIABETES MELLITUS WITH DIABETIC POLYNEUROPATHY, WITH LONG-TERM CURRENT USE OF INSULIN (HCC): Primary | ICD-10-CM

## 2023-08-23 DIAGNOSIS — E11.42 TYPE 2 DIABETES MELLITUS WITH DIABETIC POLYNEUROPATHY, WITH LONG-TERM CURRENT USE OF INSULIN (HCC): Primary | ICD-10-CM

## 2023-08-23 DIAGNOSIS — Z79.4 TYPE 2 DIABETES MELLITUS WITH HYPERGLYCEMIA, WITH LONG-TERM CURRENT USE OF INSULIN (HCC): Primary | ICD-10-CM

## 2023-08-23 PROBLEM — N52.9 IMPOTENCE: Status: ACTIVE | Noted: 2020-11-23

## 2023-08-23 PROBLEM — N28.1 RENAL CYST: Status: ACTIVE | Noted: 2020-08-17

## 2023-08-23 LAB — HBA1C MFR BLD: 8.1 %

## 2023-08-23 PROCEDURE — 1123F ACP DISCUSS/DSCN MKR DOCD: CPT | Performed by: INTERNAL MEDICINE

## 2023-08-23 PROCEDURE — 83036 HEMOGLOBIN GLYCOSYLATED A1C: CPT | Performed by: INTERNAL MEDICINE

## 2023-08-23 PROCEDURE — 99214 OFFICE O/P EST MOD 30 MIN: CPT | Performed by: INTERNAL MEDICINE

## 2023-08-23 SDOH — ECONOMIC STABILITY: HOUSING INSECURITY
IN THE LAST 12 MONTHS, WAS THERE A TIME WHEN YOU DID NOT HAVE A STEADY PLACE TO SLEEP OR SLEPT IN A SHELTER (INCLUDING NOW)?: NO

## 2023-08-23 SDOH — ECONOMIC STABILITY: FOOD INSECURITY: WITHIN THE PAST 12 MONTHS, YOU WORRIED THAT YOUR FOOD WOULD RUN OUT BEFORE YOU GOT MONEY TO BUY MORE.: NEVER TRUE

## 2023-08-23 SDOH — ECONOMIC STABILITY: FOOD INSECURITY: WITHIN THE PAST 12 MONTHS, THE FOOD YOU BOUGHT JUST DIDN'T LAST AND YOU DIDN'T HAVE MONEY TO GET MORE.: NEVER TRUE

## 2023-08-23 SDOH — ECONOMIC STABILITY: INCOME INSECURITY: HOW HARD IS IT FOR YOU TO PAY FOR THE VERY BASICS LIKE FOOD, HOUSING, MEDICAL CARE, AND HEATING?: NOT HARD AT ALL

## 2023-08-23 ASSESSMENT — ENCOUNTER SYMPTOMS
GASTROINTESTINAL NEGATIVE: 1
RESPIRATORY NEGATIVE: 1

## 2023-08-23 NOTE — PROGRESS NOTES
[facility] Program for Diabetes Health  Diabetes Self-Management Education & Support Program  Encounter Note      SUMMARY  Diabetes self-care management training was completed related to taking medications. The participant will return on September 14 to continue DSMES related to physical activity. The participant did not identify SMART Goal(s) and will practice knowledge and skills related to reducing risks, healthy eating, monitoring, and medications to improve diabetes self-management. EVALUATION:  Mr. Daniela Monte reports difficulty with self monitoring. He brought in he glucometer and required moderate assistance with FS. His FBG was 117 mg/dL. Mr. Daniela Monte listened intently to topics covered today. He denied experiencing any side effects from his medicaiton. He did report that he is injecting 22 units of Lantas nightly; not 28 units. RECOMMENDATIONS:  Dispose of needles in shatterproof container. Do not throw in the trash. TOPICS DISCUSSED TODAY:  HOW DO MY DIABETES MEDICATIONS WORK? 30      Next provider visit is scheduled for Today with Dr. Kim Mercedes EVALUATION     8/23/2023 HOW Jacinto Nathan? Type 1 medications & methods   Insulin injections   Injection sites   Type 2 medications   Oral agents   GLP-1 agonists   Hypoglycemia symptoms & treatment   Glucagon emergency kits   General guidance regarding insulin use whether Type 1, 2 or gestational diabetes   Storage of insulin   Disposal    Traveling with medications   Barriers to medication adherence      The participant   Can describe the expected action & side effects of prescribed diabetes medications: Yes  Can demonstrate injection technique (if applicable): Yes    The participant needs to address disposing of syringes in shatterproof container and not discarding in trash.      Kody Patton RD on 8/23/2023 at 9:42 AM    I have personally reviewed the health record, including provider

## 2023-08-23 NOTE — PROGRESS NOTES
Heide Diez. is a 80 y.o. male and presents with Follow-up and Diabetes    Patrick Long has been attending diabetic education sessions, last visit we increased Lantus to 28 units, starteed jardiance, continue metformin, A1C was 8.9 back then. He did not follow the instruction if increasing the Lantus, he has continued injecting 20 units of it. He is taking the Jardiance and the metformin with no problems. He has not been checking his glucose, he has been struggling to learn how to use the glucometer properly. Today he had another session with Lieutenant Garnett diabetic educator who guided him through the process of checking his fingerstick. Glucose this morning fasting 117. He is working on his diet and doing some progress. He saw Ophthalmollogist a week ago at [facility], nhe says all was good, we need note. Review of Systems  Review of Systems   Constitutional: Negative. HENT: Negative. Respiratory: Negative. Cardiovascular: Negative. Gastrointestinal: Negative. Genitourinary: Negative. Musculoskeletal: Negative. Skin: Negative. Neurological: Negative. Psychiatric/Behavioral: Negative. Past Medical History:   Diagnosis Date    Arthritis     Cancer ([facility])     Diabetes ([facility])     Elevated prostate specific antigen (PSA) 6/25/2020    He is referred from Dr. Hood Durand for elevated PSA. 5/14/2020: 36.8 with 4.3% free. 2/10/2020: 36.3 1/2/2020: 29.5 Right prostate firm and enlarged. 06- visit He has a markedly elevated PSA. He underwent biopsy today, 6/5/20. He is instructed to take his antibiotics as prescribed. He was given post procedure instructions. Thromboembolus ([facility])     Unspecified symptoms and signs involving the genitourinary system 6/25/2020    Elevated PSA with enlarged, firm prostate on exam on 5/14/2020. 06- visit Right prostate enlarged and firm, concerning in light of the elevated PSA.      Past Surgical History:   Procedure Laterality Date

## 2023-08-25 ENCOUNTER — OFFICE VISIT (OUTPATIENT)
Age: 68
End: 2023-08-25
Payer: MEDICAID

## 2023-08-25 VITALS — BODY MASS INDEX: 24.11 KG/M2 | HEIGHT: 72 IN | WEIGHT: 178 LBS

## 2023-08-25 DIAGNOSIS — N52.9 IMPOTENCE: ICD-10-CM

## 2023-08-25 DIAGNOSIS — R97.21 INCREASING PSA LEVEL AFTER TREATMENT FOR PROSTATE CANCER: Primary | ICD-10-CM

## 2023-08-25 DIAGNOSIS — Z79.4 TYPE 2 DIABETES MELLITUS WITH HYPERGLYCEMIA, WITH LONG-TERM CURRENT USE OF INSULIN (HCC): ICD-10-CM

## 2023-08-25 DIAGNOSIS — E11.65 TYPE 2 DIABETES MELLITUS WITH HYPERGLYCEMIA, WITH LONG-TERM CURRENT USE OF INSULIN (HCC): ICD-10-CM

## 2023-08-25 DIAGNOSIS — C61 MALIGNANT NEOPLASM OF PROSTATE (HCC): ICD-10-CM

## 2023-08-25 PROCEDURE — 1123F ACP DISCUSS/DSCN MKR DOCD: CPT | Performed by: UROLOGY

## 2023-08-25 PROCEDURE — 99214 OFFICE O/P EST MOD 30 MIN: CPT | Performed by: UROLOGY

## 2023-08-25 NOTE — ASSESSMENT & PLAN NOTE
Hemoglobin A1c 8.1%. He is not at his goal.  He is encouraged to make dietary changes. He is working with Dr. Jose Antonio Beck on diabetic education.   His medications have been adjusted

## 2023-08-25 NOTE — PROGRESS NOTES
HISTORY OF PRESENT ILLNESS  Elliot Quincyer. is a 80 y.o. male. has a past medical history of Arthritis, Cancer ([facility]), Diabetes ([facility]), Elevated prostate specific antigen (PSA), Thromboembolus ([address] and Unspecified symptoms and signs involving the genitourinary system. has a past surgical history that includes other surgical history; heent (Right); other surgical history; Prostatectomy (08/10/2020); prostate biopsy, needle, saturation sampling (06/05/2020); and Prostate surgery (08/10/2020). Chief Complaint   Patient presents with    Follow-up    Prostate Cancer    Elevated PSA     History of prostatectomy August 2020. PSA recurrence April 2022. He started on Orgovyx February 2023. PSA became undetectable. Last checked on 8/19/2023. His hemoglobin A1c was 8.1% 8/23/2023. He is following with Dr. Ashtyn Almendarez and getting diabetes education. He has been started on Jardiance. He has had no change in his urination. He thinks he is doing much better. Elevated PSA    1. Increasing PSA level after treatment for prostate cancer  Overview:  He is s/p prostatectomy on 8/10/2020. PSA became detectable at 0.1 on 4/29/22.  8/1/22: 0.1  11/7/22: 0.2  2/10/23: 0.2    Started on orgovyx in Feb, 2023. PSA went to undetectable. Assessment & Plan:  PSA is undetectable. Is controlled on Orgovyx  Orders:  -     PSA, Diagnostic; Future  2. Malignant neoplasm of prostate ([address] )  Overview:  Pretreatment PSA 36.8 with 4.3% free. He is s/p biopsy on 6/5/2020. He has [place]'s 6 disease in 3/12 cores with up to 70% involvment and Yoni's 3+4 disease in 1/12 cores with 80% involvement. He is s/p prostatectomy and PLND on 8/10/2020. Bladder neck margin positive, [place]'s 4+4, pT3aN0. PSA had been undetectable until 4/29/22 (0.1). Started on Orgovyx, February 2023. PSA went to undetectable. Assessment & Plan:   History of prostatectomy with PSA recurrence.   Controlled on Orgovyx  Orders:  -     PSA,

## 2023-09-24 DIAGNOSIS — E11.65 TYPE 2 DIABETES MELLITUS WITH HYPERGLYCEMIA, WITH LONG-TERM CURRENT USE OF INSULIN (HCC): ICD-10-CM

## 2023-09-24 DIAGNOSIS — Z79.4 TYPE 2 DIABETES MELLITUS WITH HYPERGLYCEMIA, WITH LONG-TERM CURRENT USE OF INSULIN (HCC): ICD-10-CM

## 2023-09-26 RX ORDER — APIXABAN 5 MG/1
5 TABLET, FILM COATED ORAL 2 TIMES DAILY
Qty: 180 TABLET | Refills: 1 | Status: SHIPPED | OUTPATIENT
Start: 2023-09-26

## 2023-09-26 RX ORDER — EMPAGLIFLOZIN 25 MG/1
25 TABLET, FILM COATED ORAL DAILY
Qty: 90 TABLET | Refills: 1 | Status: SHIPPED | OUTPATIENT
Start: 2023-09-26

## 2023-09-26 RX ORDER — ROSUVASTATIN CALCIUM 20 MG/1
20 TABLET, COATED ORAL
Qty: 90 TABLET | Refills: 1 | Status: SHIPPED | OUTPATIENT
Start: 2023-09-26

## 2023-09-26 RX ORDER — ASPIRIN 81 MG/1
TABLET ORAL
Qty: 90 TABLET | Refills: 1 | Status: SHIPPED | OUTPATIENT
Start: 2023-09-26

## 2023-10-04 DIAGNOSIS — Z79.4 TYPE 2 DIABETES MELLITUS WITH HYPERGLYCEMIA, WITH LONG-TERM CURRENT USE OF INSULIN (HCC): ICD-10-CM

## 2023-10-04 DIAGNOSIS — E11.65 TYPE 2 DIABETES MELLITUS WITH HYPERGLYCEMIA, WITH LONG-TERM CURRENT USE OF INSULIN (HCC): ICD-10-CM

## 2023-10-12 ENCOUNTER — OFFICE VISIT (OUTPATIENT)
Age: 68
End: 2023-10-12
Payer: COMMERCIAL

## 2023-10-12 VITALS
SYSTOLIC BLOOD PRESSURE: 129 MMHG | HEIGHT: 72 IN | RESPIRATION RATE: 16 BRPM | BODY MASS INDEX: 24.24 KG/M2 | DIASTOLIC BLOOD PRESSURE: 82 MMHG | HEART RATE: 63 BPM | TEMPERATURE: 97.5 F | WEIGHT: 179 LBS | OXYGEN SATURATION: 97 %

## 2023-10-12 DIAGNOSIS — E11.65 TYPE 2 DIABETES MELLITUS WITH HYPERGLYCEMIA, WITH LONG-TERM CURRENT USE OF INSULIN (HCC): Primary | ICD-10-CM

## 2023-10-12 DIAGNOSIS — Z79.4 TYPE 2 DIABETES MELLITUS WITH HYPERGLYCEMIA, WITH LONG-TERM CURRENT USE OF INSULIN (HCC): Primary | ICD-10-CM

## 2023-10-12 DIAGNOSIS — L85.3 XEROSIS CUTIS: ICD-10-CM

## 2023-10-12 DIAGNOSIS — E11.42 TYPE 2 DIABETES MELLITUS WITH DIABETIC POLYNEUROPATHY, WITH LONG-TERM CURRENT USE OF INSULIN (HCC): Primary | ICD-10-CM

## 2023-10-12 DIAGNOSIS — Z79.4 TYPE 2 DIABETES MELLITUS WITH DIABETIC POLYNEUROPATHY, WITH LONG-TERM CURRENT USE OF INSULIN (HCC): Primary | ICD-10-CM

## 2023-10-12 DIAGNOSIS — L60.3 ONYCHODYSTROPHY: ICD-10-CM

## 2023-10-12 PROCEDURE — 1123F ACP DISCUSS/DSCN MKR DOCD: CPT | Performed by: PODIATRIST

## 2023-10-12 PROCEDURE — G0108 DIAB MANAGE TRN  PER INDIV: HCPCS | Performed by: DIETITIAN, REGISTERED

## 2023-10-12 PROCEDURE — 11721 DEBRIDE NAIL 6 OR MORE: CPT | Performed by: PODIATRIST

## 2023-10-12 PROCEDURE — 99214 OFFICE O/P EST MOD 30 MIN: CPT | Performed by: PODIATRIST

## 2023-10-12 NOTE — PROGRESS NOTES
participant needs to address increasing his PA; he liked doing chair exercises and enjoys walking. Veronique Mcfadden RD on 10/12/2023 at 10:22 AM    I have personally reviewed the health record, including provider notes, laboratory data and current medications before making these care and education recommendations. The time spent in this effort is included in the total time.   Total minutes: 60

## 2023-10-13 RX ORDER — INSULIN GLARGINE 100 [IU]/ML
INJECTION, SOLUTION SUBCUTANEOUS
Qty: 10 ML | Refills: 0 | Status: SHIPPED | OUTPATIENT
Start: 2023-10-13

## 2023-11-06 DIAGNOSIS — Z79.4 TYPE 2 DIABETES MELLITUS WITH HYPERGLYCEMIA, WITH LONG-TERM CURRENT USE OF INSULIN (HCC): ICD-10-CM

## 2023-11-06 DIAGNOSIS — E11.65 TYPE 2 DIABETES MELLITUS WITH HYPERGLYCEMIA, WITH LONG-TERM CURRENT USE OF INSULIN (HCC): ICD-10-CM

## 2023-11-06 RX ORDER — INSULIN GLARGINE 100 [IU]/ML
28 INJECTION, SOLUTION SUBCUTANEOUS NIGHTLY
Qty: 25 ML | Refills: 1 | Status: SHIPPED | OUTPATIENT
Start: 2023-11-06 | End: 2024-05-04

## 2023-11-08 ENCOUNTER — OFFICE VISIT (OUTPATIENT)
Age: 68
End: 2023-11-08

## 2023-11-08 DIAGNOSIS — E11.65 TYPE 2 DIABETES MELLITUS WITH HYPERGLYCEMIA, WITH LONG-TERM CURRENT USE OF INSULIN (HCC): Primary | ICD-10-CM

## 2023-11-08 DIAGNOSIS — Z79.4 TYPE 2 DIABETES MELLITUS WITH HYPERGLYCEMIA, WITH LONG-TERM CURRENT USE OF INSULIN (HCC): Primary | ICD-10-CM

## 2023-11-08 NOTE — PROGRESS NOTES
[facility] Program for Diabetes Health  Diabetes Self-Management Education & Support Program  Encounter Note      SUMMARY  Diabetes self-care management training was completed related to healthy coping and problem solving. The participant will return in 6 weeks to follow up on DSMES related to reducing risks, healthy eating, monitoring, taking medications, physical activity, healthy coping, and problem solving. The participant did not identify SMART Goal(s) and will practice knowledge and skills related to reducing risks, healthy eating and monitoring, being active and medications, and healthy coping and problem solving to improve diabetes self-management. EVALUATION:  Participant was engaged in learning. He shared that he leans heavily on his shefali to help him with coping and problem solving. He stated that when he was initially diagnosed with diabetes, he felt guilty and sad, but now feels hopeful and happy. States that he is going to win this tracey of diabetes. He likes to exercise, pray and talk with friends to manage stress. He will return in 6 weeks to follow up on DSMES. RECOMMENDATIONS:   6 week follow up  Continue using skills obtained in DSMES classes  TOPICS DISCUSSED TODAY:  HOW DO I FIND SUPPORT TO TACKLE THIS CONDITION? 30  HOW DO I FIGURE OUT WHAT'S INFLUENCING MY BLOOD GLUCOSES? 30      Next provider visit is scheduled for 6 weeks       SMART GOAL(S)  Log blood glucose readings 3  days over the next week. ACHIEVEMENT OF GOAL(S) : %    2. Practice building a balanced meal for Lunch and dinner  at least 4 times over the next week. ACHIEVEMENT OF GOAL(S) :  %    3. Increase physical activity by walking  for 30  minutes 2 times over the next week. ACHIEVEMENT OF GOAL(S) :  %         DATE DSMES TOPIC EVALUATION     11/8/2023 HOW DO I FIND SUPPORT TO TACKLE THIS CONDITION?    Normal responses to diabetes diagnosis or complication   Shock   Anger & resentment

## 2023-11-25 DIAGNOSIS — C61 MALIGNANT NEOPLASM OF PROSTATE (HCC): ICD-10-CM

## 2023-11-25 DIAGNOSIS — R97.21 INCREASING PSA LEVEL AFTER TREATMENT FOR PROSTATE CANCER: ICD-10-CM

## 2023-11-29 ENCOUNTER — TELEPHONE (OUTPATIENT)
Facility: CLINIC | Age: 68
End: 2023-11-29

## 2023-11-30 RX ORDER — SYRINGE AND NEEDLE,INSULIN,1ML 29 G X1/2"
SYRINGE, EMPTY DISPOSABLE MISCELLANEOUS
Qty: 100 EACH | Refills: 3 | Status: SHIPPED | OUTPATIENT
Start: 2023-11-30

## 2023-12-02 LAB — PSA SERPL-MCNC: <0.1 NG/ML (ref 0–4)

## 2023-12-05 NOTE — RESULT ENCOUNTER NOTE
Patient's test were reviewed.  Patient has upcoming appointment and the plan is to discuss it at that time.

## 2023-12-12 ENCOUNTER — OFFICE VISIT (OUTPATIENT)
Age: 68
End: 2023-12-12
Payer: COMMERCIAL

## 2023-12-12 VITALS
WEIGHT: 179 LBS | TEMPERATURE: 97.8 F | BODY MASS INDEX: 24.24 KG/M2 | HEIGHT: 72 IN | DIASTOLIC BLOOD PRESSURE: 81 MMHG | OXYGEN SATURATION: 100 % | SYSTOLIC BLOOD PRESSURE: 152 MMHG | HEART RATE: 72 BPM

## 2023-12-12 DIAGNOSIS — R97.21 INCREASING PSA LEVEL AFTER TREATMENT FOR PROSTATE CANCER: ICD-10-CM

## 2023-12-12 DIAGNOSIS — C61 MALIGNANT NEOPLASM OF PROSTATE (HCC): Primary | ICD-10-CM

## 2023-12-12 DIAGNOSIS — N52.9 IMPOTENCE: ICD-10-CM

## 2023-12-12 DIAGNOSIS — N20.0 KIDNEY STONE: ICD-10-CM

## 2023-12-12 PROCEDURE — 1123F ACP DISCUSS/DSCN MKR DOCD: CPT | Performed by: UROLOGY

## 2023-12-12 PROCEDURE — 99214 OFFICE O/P EST MOD 30 MIN: CPT | Performed by: UROLOGY

## 2023-12-14 ENCOUNTER — OFFICE VISIT (OUTPATIENT)
Age: 68
End: 2023-12-14

## 2023-12-14 DIAGNOSIS — E11.65 TYPE 2 DIABETES MELLITUS WITH HYPERGLYCEMIA, WITH LONG-TERM CURRENT USE OF INSULIN (HCC): Primary | ICD-10-CM

## 2023-12-14 DIAGNOSIS — Z79.4 TYPE 2 DIABETES MELLITUS WITH HYPERGLYCEMIA, WITH LONG-TERM CURRENT USE OF INSULIN (HCC): Primary | ICD-10-CM

## 2023-12-14 NOTE — PROGRESS NOTES
[facility] Program for Diabetes Health  Diabetes Self-Management Education & Support Program  Post-program Evaluation    EVALUATION @ [address]. completed 8 hours of diabetes self-management education. The participant acquired new knowledge and demonstrated new skills during the program.     The participant worked on the following SMART GOAL(s) to improve their diabetes self-management:    Log blood glucose readings 3  days over the next week. ACHIEVEMENT OF GOAL(S) : %    2. Practice building a balanced meal for Lunch and dinner  at least 4 times over the next week. ACHIEVEMENT OF GOAL(S) : %    3. Increase physical activity by walking  for 30  minutes 2 times over the next week. ACHIEVEMENT OF GOAL(S) : %    The participant's pre-program A1c was   Lab Results   Component Value Date/Time    CHD5WGVS 8.1 08/23/2023 10:25 AM    LABA1C 7.0 09/21/2020 11:40 AM   . The post-evaluation A1c is unknown. The participant improved their Diabetes Skills, Confidence and Preparedness Index (scored out of 7): Total score: 6.8  Skills: 6.4  Confidence: 7.0  Preparedness: 7.0    FINAL RECOMMENDATIONS:  Participant states that he continues to exercise regularly. He monitors blood glucose 1 time per day with average being 120. He is also using healthy plate to meal prep. He has good community support. He will follow up with Dr. Mehreen Best in March of 2024. Next provider visit is scheduled for 3/13/23 with Dr. Cachorro Connelly, RN on 12/14/2023     Metric Patient's responses (12/14/2023) Areas for improvement     Healthy Eating       The participant is using the Healthy Plate to control carbohydrate intake - Yes    The participant reads food labels accurately - Yes          Being Active       The participant performs physical activity where your heart beats faster and your breathing is harder than normal for 30 minutes or more?  3 day(s)

## 2023-12-26 ENCOUNTER — TELEPHONE (OUTPATIENT)
Age: 68
End: 2023-12-26

## 2023-12-26 RX ORDER — RELUGOLIX 120 MG/1
120 TABLET, FILM COATED ORAL DAILY
Qty: 30 TABLET | Refills: 5 | Status: SHIPPED | OUTPATIENT
Start: 2023-12-26

## 2023-12-26 NOTE — TELEPHONE ENCOUNTER
[facility] is requesting a refill of orgovyx be sent to them     Fax: [phone]

## 2024-03-07 ENCOUNTER — TELEPHONE (OUTPATIENT)
Facility: CLINIC | Age: 69
End: 2024-03-07

## 2024-03-07 NOTE — TELEPHONE ENCOUNTER
----- Message from Emmie Metzger sent at 3/6/2024 11:31 AM EST -----  Subject: Appointment Request    Reason for Call: Established Patient Appointment needed: Routine Existing   Condition Follow Up    QUESTIONS    Reason for appointment request? Other - Sorry, the appointment cannot be   booked with the [facility] for this provider. Please send a message to the   provider.     Additional Information for Provider? Patient needs to rescheduled   3.13.2024 appointment with Ash Bird for a follow up for Return in   about 3 months (around 11/23/2023) for DM, 30 minute. Patient needs the   appointment for Tuesday, March 19th and he is wanting a morning   appointment. Please reach out to the patient to discuss and reschedule.   ---------------------------------------------------------------------------  --------------  CALL BACK INFO  [phone]; OK to leave message on voicemail  ---------------------------------------------------------------------------  --------------  SCRIPT ANSWERS

## 2024-03-11 NOTE — TELEPHONE ENCOUNTER
Patient spoke with Beba and she advised him that Dr. Bird is booked up until [facility].  Patient stated that he will keep the appointment he already has for March 13, 2024.

## 2024-03-13 ENCOUNTER — OFFICE VISIT (OUTPATIENT)
Facility: CLINIC | Age: 69
End: 2024-03-13
Payer: MEDICAID

## 2024-03-13 VITALS
TEMPERATURE: 98.1 F | DIASTOLIC BLOOD PRESSURE: 83 MMHG | BODY MASS INDEX: 24.24 KG/M2 | OXYGEN SATURATION: 98 % | HEIGHT: 72 IN | SYSTOLIC BLOOD PRESSURE: 125 MMHG | WEIGHT: 179 LBS | HEART RATE: 98 BPM

## 2024-03-13 DIAGNOSIS — I26.92 SADDLE EMBOLUS OF PULMONARY ARTERY WITHOUT ACUTE COR PULMONALE, UNSPECIFIED CHRONICITY (HCC): ICD-10-CM

## 2024-03-13 DIAGNOSIS — E78.5 HYPERLIPIDEMIA, UNSPECIFIED HYPERLIPIDEMIA TYPE: ICD-10-CM

## 2024-03-13 DIAGNOSIS — Z79.4 TYPE 2 DIABETES MELLITUS WITH HYPERGLYCEMIA, WITH LONG-TERM CURRENT USE OF INSULIN (HCC): Primary | ICD-10-CM

## 2024-03-13 DIAGNOSIS — E11.65 TYPE 2 DIABETES MELLITUS WITH HYPERGLYCEMIA, WITH LONG-TERM CURRENT USE OF INSULIN (HCC): ICD-10-CM

## 2024-03-13 DIAGNOSIS — E11.65 TYPE 2 DIABETES MELLITUS WITH HYPERGLYCEMIA, WITH LONG-TERM CURRENT USE OF INSULIN (HCC): Primary | ICD-10-CM

## 2024-03-13 DIAGNOSIS — Z79.4 TYPE 2 DIABETES MELLITUS WITH HYPERGLYCEMIA, WITH LONG-TERM CURRENT USE OF INSULIN (HCC): ICD-10-CM

## 2024-03-13 DIAGNOSIS — C61 MALIGNANT NEOPLASM OF PROSTATE (HCC): ICD-10-CM

## 2024-03-13 LAB
GLUCOSE, POC: 187 MG/DL
HBA1C MFR BLD: 8.8 %

## 2024-03-13 PROCEDURE — 82962 GLUCOSE BLOOD TEST: CPT | Performed by: STUDENT IN AN ORGANIZED HEALTH CARE EDUCATION/TRAINING PROGRAM

## 2024-03-13 PROCEDURE — 1123F ACP DISCUSS/DSCN MKR DOCD: CPT | Performed by: STUDENT IN AN ORGANIZED HEALTH CARE EDUCATION/TRAINING PROGRAM

## 2024-03-13 PROCEDURE — 83036 HEMOGLOBIN GLYCOSYLATED A1C: CPT | Performed by: STUDENT IN AN ORGANIZED HEALTH CARE EDUCATION/TRAINING PROGRAM

## 2024-03-13 PROCEDURE — 99214 OFFICE O/P EST MOD 30 MIN: CPT | Performed by: STUDENT IN AN ORGANIZED HEALTH CARE EDUCATION/TRAINING PROGRAM

## 2024-03-13 RX ORDER — INSULIN GLARGINE 100 [IU]/ML
32 INJECTION, SOLUTION SUBCUTANEOUS NIGHTLY
COMMUNITY

## 2024-03-13 ASSESSMENT — PATIENT HEALTH QUESTIONNAIRE - PHQ9
SUM OF ALL RESPONSES TO PHQ QUESTIONS 1-9: 0
SUM OF ALL RESPONSES TO PHQ QUESTIONS 1-9: 0
SUM OF ALL RESPONSES TO PHQ9 QUESTIONS 1 & 2: 0
SUM OF ALL RESPONSES TO PHQ QUESTIONS 1-9: 0
2. FEELING DOWN, DEPRESSED OR HOPELESS: 0
1. LITTLE INTEREST OR PLEASURE IN DOING THINGS: 0
SUM OF ALL RESPONSES TO PHQ QUESTIONS 1-9: 0

## 2024-03-13 ASSESSMENT — ENCOUNTER SYMPTOMS
ABDOMINAL PAIN: 0
CONSTIPATION: 0
DIARRHEA: 0
SHORTNESS OF BREATH: 0

## 2024-03-13 NOTE — PROGRESS NOTES
.  Chief Complaint   Patient presents with    \[facility] [place] [facility]\"" Care     Establish Care. Former patient of Dr. Shahid.       .  \"Have you been to the ER, urgent care clinic since your last visit?  Hospitalized since your last visit?\"    NO    “Have you seen or consulted any other health care providers outside of [facility] since your last visit?”    NO    ./83 (Site: Right Upper Arm, Position: Sitting, Cuff Size: Medium Adult)   Pulse 98   Temp 98.1 °F (36.7 °C) (Oral)   Ht 1.829 m (6')   Wt 81.2 kg (179 lb)   SpO2 98%   BMI 24.28 kg/m²           Click Here for Release of Records Request   
Palpations: Abdomen is soft.   Neurological:      General: No focal deficit present.      Mental Status: He is alert. Mental status is at baseline.   Psychiatric:         Mood and Affect: Mood normal.         Behavior: Behavior normal.                 An electronic signature was used to authenticate this note.  -- Ash Bird MD

## 2024-03-20 DIAGNOSIS — E11.65 TYPE 2 DIABETES MELLITUS WITH HYPERGLYCEMIA, WITH LONG-TERM CURRENT USE OF INSULIN (HCC): ICD-10-CM

## 2024-03-20 DIAGNOSIS — Z79.4 TYPE 2 DIABETES MELLITUS WITH HYPERGLYCEMIA, WITH LONG-TERM CURRENT USE OF INSULIN (HCC): ICD-10-CM

## 2024-03-21 RX ORDER — EMPAGLIFLOZIN 25 MG/1
25 TABLET, FILM COATED ORAL DAILY
Qty: 30 TABLET | Refills: 0 | Status: SHIPPED | OUTPATIENT
Start: 2024-03-21

## 2024-03-21 RX ORDER — ASPIRIN 81 MG/1
81 TABLET, COATED ORAL EVERY MORNING
Qty: 30 TABLET | Refills: 0 | Status: SHIPPED | OUTPATIENT
Start: 2024-03-21

## 2024-03-21 RX ORDER — ROSUVASTATIN CALCIUM 20 MG/1
20 TABLET, COATED ORAL
Qty: 30 TABLET | Refills: 0 | Status: SHIPPED | OUTPATIENT
Start: 2024-03-21

## 2024-04-10 DIAGNOSIS — Z79.4 TYPE 2 DIABETES MELLITUS WITH HYPERGLYCEMIA, WITH LONG-TERM CURRENT USE OF INSULIN (HCC): ICD-10-CM

## 2024-04-10 DIAGNOSIS — E11.65 TYPE 2 DIABETES MELLITUS WITH HYPERGLYCEMIA, WITH LONG-TERM CURRENT USE OF INSULIN (HCC): ICD-10-CM

## 2024-04-11 RX ORDER — ROSUVASTATIN CALCIUM 20 MG/1
20 TABLET, COATED ORAL
Qty: 90 TABLET | Refills: 1 | Status: SHIPPED | OUTPATIENT
Start: 2024-04-11

## 2024-04-11 RX ORDER — APIXABAN 5 MG/1
5 TABLET, FILM COATED ORAL 2 TIMES DAILY
Qty: 180 TABLET | Refills: 1 | Status: SHIPPED | OUTPATIENT
Start: 2024-04-11

## 2024-04-11 RX ORDER — EMPAGLIFLOZIN 25 MG/1
25 TABLET, FILM COATED ORAL DAILY
Qty: 30 TABLET | Refills: 0 | Status: SHIPPED | OUTPATIENT
Start: 2024-04-11

## 2024-04-11 RX ORDER — ASPIRIN 81 MG/1
81 TABLET, COATED ORAL EVERY MORNING
Qty: 90 TABLET | Refills: 1 | Status: SHIPPED | OUTPATIENT
Start: 2024-04-11

## 2024-05-08 DIAGNOSIS — Z79.4 TYPE 2 DIABETES MELLITUS WITH HYPERGLYCEMIA, WITH LONG-TERM CURRENT USE OF INSULIN (HCC): ICD-10-CM

## 2024-05-08 DIAGNOSIS — E11.65 TYPE 2 DIABETES MELLITUS WITH HYPERGLYCEMIA, WITH LONG-TERM CURRENT USE OF INSULIN (HCC): ICD-10-CM

## 2024-05-08 RX ORDER — EMPAGLIFLOZIN 25 MG/1
25 TABLET, FILM COATED ORAL DAILY
Qty: 90 TABLET | Refills: 1 | OUTPATIENT
Start: 2024-05-08

## 2024-05-15 DIAGNOSIS — Z79.4 TYPE 2 DIABETES MELLITUS WITH HYPERGLYCEMIA, WITH LONG-TERM CURRENT USE OF INSULIN (HCC): ICD-10-CM

## 2024-05-15 DIAGNOSIS — E11.65 TYPE 2 DIABETES MELLITUS WITH HYPERGLYCEMIA, WITH LONG-TERM CURRENT USE OF INSULIN (HCC): ICD-10-CM

## 2024-05-18 LAB
ALBUMIN SERPL-MCNC: 4.7 G/DL (ref 3.9–4.9)
ALBUMIN/GLOB SERPL: 1.6 {RATIO} (ref 1.2–2.2)
ALP SERPL-CCNC: 84 IU/L (ref 44–121)
ALT SERPL-CCNC: 17 IU/L (ref 0–44)
AST SERPL-CCNC: 17 IU/L (ref 0–40)
BILIRUB SERPL-MCNC: 0.5 MG/DL (ref 0–1.2)
BUN SERPL-MCNC: 20 MG/DL (ref 8–27)
BUN/CREAT SERPL: 21 (ref 10–24)
CALCIUM SERPL-MCNC: 10.4 MG/DL (ref 8.6–10.2)
CHLORIDE SERPL-SCNC: 106 MMOL/L (ref 96–106)
CHOLEST SERPL-MCNC: 108 MG/DL (ref 100–199)
CO2 SERPL-SCNC: 21 MMOL/L (ref 20–29)
CREAT SERPL-MCNC: 0.95 MG/DL (ref 0.76–1.27)
EGFRCR SERPLBLD CKD-EPI 2021: 87 ML/MIN/1.73
ERYTHROCYTE [DISTWIDTH] IN BLOOD BY AUTOMATED COUNT: 13.8 % (ref 11.6–15.4)
GLOBULIN SER CALC-MCNC: 3 G/DL (ref 1.5–4.5)
GLUCOSE SERPL-MCNC: 190 MG/DL (ref 70–99)
HCT VFR BLD AUTO: 40.8 % (ref 37.5–51)
HDLC SERPL-MCNC: 52 MG/DL
HGB BLD-MCNC: 13.2 G/DL (ref 13–17.7)
LDLC SERPL CALC-MCNC: 39 MG/DL (ref 0–99)
MCH RBC QN AUTO: 29 PG (ref 26.6–33)
MCHC RBC AUTO-ENTMCNC: 32.4 G/DL (ref 31.5–35.7)
MCV RBC AUTO: 90 FL (ref 79–97)
PLATELET # BLD AUTO: 301 X10E3/UL (ref 150–450)
POTASSIUM SERPL-SCNC: 5.3 MMOL/L (ref 3.5–5.2)
PROT SERPL-MCNC: 7.7 G/DL (ref 6–8.5)
RBC # BLD AUTO: 4.55 X10E6/UL (ref 4.14–5.8)
SODIUM SERPL-SCNC: 146 MMOL/L (ref 134–144)
TRIGL SERPL-MCNC: 90 MG/DL (ref 0–149)
VLDLC SERPL CALC-MCNC: 17 MG/DL (ref 5–40)
WBC # BLD AUTO: 5 X10E3/UL (ref 3.4–10.8)

## 2024-05-24 DIAGNOSIS — Z79.4 TYPE 2 DIABETES MELLITUS WITH HYPERGLYCEMIA, WITH LONG-TERM CURRENT USE OF INSULIN (HCC): ICD-10-CM

## 2024-05-24 DIAGNOSIS — E11.65 TYPE 2 DIABETES MELLITUS WITH HYPERGLYCEMIA, WITH LONG-TERM CURRENT USE OF INSULIN (HCC): ICD-10-CM

## 2024-05-24 RX ORDER — EMPAGLIFLOZIN 25 MG/1
25 TABLET, FILM COATED ORAL DAILY
Qty: 30 TABLET | Refills: 0 | OUTPATIENT
Start: 2024-05-24

## 2024-06-05 LAB
ALBUMIN SERPL-MCNC: 4.7 G/DL (ref 3.9–4.9)
ALBUMIN/GLOB SERPL: 1.4 {RATIO} (ref 1.2–2.2)
ALP SERPL-CCNC: 88 IU/L (ref 44–121)
ALT SERPL-CCNC: 17 IU/L (ref 0–44)
AST SERPL-CCNC: 17 IU/L (ref 0–40)
BILIRUB SERPL-MCNC: 0.4 MG/DL (ref 0–1.2)
BUN SERPL-MCNC: 29 MG/DL (ref 8–27)
BUN/CREAT SERPL: 28 (ref 10–24)
CALCIUM SERPL-MCNC: 10 MG/DL (ref 8.6–10.2)
CHLORIDE SERPL-SCNC: 101 MMOL/L (ref 96–106)
CHOLEST SERPL-MCNC: 132 MG/DL (ref 100–199)
CO2 SERPL-SCNC: 18 MMOL/L (ref 20–29)
CREAT SERPL-MCNC: 1.05 MG/DL (ref 0.76–1.27)
EGFRCR SERPLBLD CKD-EPI 2021: 77 ML/MIN/1.73
ERYTHROCYTE [DISTWIDTH] IN BLOOD BY AUTOMATED COUNT: 13.7 % (ref 11.6–15.4)
GLOBULIN SER CALC-MCNC: 3.4 G/DL (ref 1.5–4.5)
GLUCOSE SERPL-MCNC: 272 MG/DL (ref 70–99)
HCT VFR BLD AUTO: 43 % (ref 37.5–51)
HDLC SERPL-MCNC: 50 MG/DL
HGB BLD-MCNC: 14 G/DL (ref 13–17.7)
LDLC SERPL CALC-MCNC: 50 MG/DL (ref 0–99)
MCH RBC QN AUTO: 29.3 PG (ref 26.6–33)
MCHC RBC AUTO-ENTMCNC: 32.6 G/DL (ref 31.5–35.7)
MCV RBC AUTO: 90 FL (ref 79–97)
PLATELET # BLD AUTO: 244 X10E3/UL (ref 150–450)
POTASSIUM SERPL-SCNC: 5.1 MMOL/L (ref 3.5–5.2)
PROT SERPL-MCNC: 8.1 G/DL (ref 6–8.5)
RBC # BLD AUTO: 4.78 X10E6/UL (ref 4.14–5.8)
SODIUM SERPL-SCNC: 141 MMOL/L (ref 134–144)
TRIGL SERPL-MCNC: 198 MG/DL (ref 0–149)
VLDLC SERPL CALC-MCNC: 32 MG/DL (ref 5–40)
WBC # BLD AUTO: 5.2 X10E3/UL (ref 3.4–10.8)

## 2024-06-11 ENCOUNTER — OFFICE VISIT (OUTPATIENT)
Age: 69
End: 2024-06-11
Payer: MEDICAID

## 2024-06-11 VITALS
HEART RATE: 103 BPM | DIASTOLIC BLOOD PRESSURE: 91 MMHG | WEIGHT: 179 LBS | SYSTOLIC BLOOD PRESSURE: 140 MMHG | BODY MASS INDEX: 24.24 KG/M2 | HEIGHT: 72 IN

## 2024-06-11 DIAGNOSIS — E11.42 TYPE 2 DIABETES MELLITUS WITH DIABETIC POLYNEUROPATHY, WITH LONG-TERM CURRENT USE OF INSULIN (HCC): ICD-10-CM

## 2024-06-11 DIAGNOSIS — C61 MALIGNANT NEOPLASM OF PROSTATE (HCC): Primary | ICD-10-CM

## 2024-06-11 DIAGNOSIS — N18.31 CHRONIC RENAL FAILURE, STAGE 3A (HCC): ICD-10-CM

## 2024-06-11 DIAGNOSIS — R97.21 INCREASING PSA LEVEL AFTER TREATMENT FOR PROSTATE CANCER: ICD-10-CM

## 2024-06-11 DIAGNOSIS — Z79.4 TYPE 2 DIABETES MELLITUS WITH DIABETIC POLYNEUROPATHY, WITH LONG-TERM CURRENT USE OF INSULIN (HCC): ICD-10-CM

## 2024-06-11 DIAGNOSIS — C61 MALIGNANT NEOPLASM OF PROSTATE (HCC): ICD-10-CM

## 2024-06-11 PROCEDURE — 99214 OFFICE O/P EST MOD 30 MIN: CPT | Performed by: UROLOGY

## 2024-06-11 PROCEDURE — 1123F ACP DISCUSS/DSCN MKR DOCD: CPT | Performed by: UROLOGY

## 2024-06-11 RX ORDER — RELUGOLIX 120 MG/1
120 TABLET, FILM COATED ORAL DAILY
Qty: 30 TABLET | Refills: 5 | Status: SHIPPED | OUTPATIENT
Start: 2024-06-11

## 2024-06-11 NOTE — PROGRESS NOTES
Chief Complaint   Patient presents with    Follow-up     BP (!) 140/91 (Site: Right Upper Arm, Position: Sitting)   Pulse (!) 103   Ht 1.829 m (6')   Wt 81.2 kg (179 lb)   BMI 24.28 kg/m²       1. Have you been to the ER, urgent care clinic since your last visit?  Hospitalized since your last visit?No    2. Have you seen or consulted any other health care providers outside of the [facility] since your last visit?  Include any pap smears or colon screening. No    
Orgovyx.  Medication.  Recheck PSA.  Orders:  -     PSA, Diagnostic; Future  -     Relugolix (ORGOVYX) 120 MG TABS; Take 120 mg by mouth daily, Disp-30 tablet, R-5Normal  -     PSA, Diagnostic; Future  3. Chronic renal failure, stage 3a (HCC)  Assessment & Plan:   Creatinine is normal now.  eGFR 77.  4. Type 2 diabetes mellitus with diabetic polyneuropathy, with long-term current use of insulin ([facility])  Assessment & Plan:  His blood sugar is singly running high.  He is on Jardiance and Lantus insulin.      No Known Allergies   Prior to Admission medications    Medication Sig Start Date End Date Taking? Authorizing Provider   Relugolix (ORGOVYX) 120 MG TABS Take 120 mg by mouth daily 6/11/24  Yes Marko Araujo MD   metFORMIN (GLUCOPHAGE) 1000 MG tablet Take 1 tablet by mouth 2 times daily (with meals) with meals 6/5/24   Ash Bird MD   empagliflozin (JARDIANCE) 25 MG tablet Take 1 tablet by mouth daily Courtesy refill given, needs to get blood work done for further refills.  . 5/15/24   Ash Bird MD   ELIQUIS 5 MG TABS tablet TAKE 1 TABLET BY MOUTH TWICE DAILY. 4/11/24   Ash Bird MD   aspirin (ASPIRIN LOW DOSE) 81 MG EC tablet TAKE ONE TABLET BY MOUTH EVERY MORNING. 4/11/24   Ash Bird MD   rosuvastatin (CRESTOR) 20 MG tablet TAKE ONE TABLET BY MOUTH AT BEDTIME 4/11/24   Ash Bird MD   insulin glargine (LANTUS) 100 UNIT/ML injection vial Inject 32 Units into the skin nightly    Bert Ravi MD   EASY TOUCH INSULIN SYRINGE 29G X 1/2\" 1 ML MISC USE DAILY WITH INSULIN 11/30/23   Delia Amaya MD   blood glucose test strips (ASCENSIA AUTODISC VI;ONE TOUCH ULTRA TEST VI) strip Use to check glucose once daily before breakfast 8/8/22   Bert Ravi MD   Continuous Blood Gluc Sensor (FREESTYLE AZAR 3 SENSOR) MISC Use to check glucose 3 times a day before meals  Patient not taking: Reported on 3/13/2024 5/23/23   Delia Amaya MD   Continuous Blood

## 2024-06-11 NOTE — ASSESSMENT & PLAN NOTE
History of prostate cancer status post prostatectomy August 10, 2020.  He had a high risk disease with Yoni's 4 with positive bladder neck margin.  His PSA became detectable April 2022.  He started on Orgovyx February 2023.    Continue medication.  Check PSA.

## 2024-06-12 DIAGNOSIS — R97.21 INCREASING PSA LEVEL AFTER TREATMENT FOR PROSTATE CANCER: ICD-10-CM

## 2024-06-12 DIAGNOSIS — C61 MALIGNANT NEOPLASM OF PROSTATE (HCC): ICD-10-CM

## 2024-06-19 DIAGNOSIS — E11.65 TYPE 2 DIABETES MELLITUS WITH HYPERGLYCEMIA, WITH LONG-TERM CURRENT USE OF INSULIN (HCC): ICD-10-CM

## 2024-06-19 DIAGNOSIS — E11.65 TYPE 2 DIABETES MELLITUS WITH HYPERGLYCEMIA, WITH LONG-TERM CURRENT USE OF INSULIN (HCC): Primary | ICD-10-CM

## 2024-06-19 DIAGNOSIS — Z79.4 TYPE 2 DIABETES MELLITUS WITH HYPERGLYCEMIA, WITH LONG-TERM CURRENT USE OF INSULIN (HCC): Primary | ICD-10-CM

## 2024-06-19 DIAGNOSIS — Z79.4 TYPE 2 DIABETES MELLITUS WITH HYPERGLYCEMIA, WITH LONG-TERM CURRENT USE OF INSULIN (HCC): ICD-10-CM

## 2024-06-21 LAB — PSA SERPL-MCNC: <0.1 NG/ML (ref 0–4)

## 2024-07-09 ENCOUNTER — OFFICE VISIT (OUTPATIENT)
Facility: CLINIC | Age: 69
End: 2024-07-09
Payer: MEDICAID

## 2024-07-09 VITALS
RESPIRATION RATE: 18 BRPM | SYSTOLIC BLOOD PRESSURE: 131 MMHG | OXYGEN SATURATION: 94 % | HEART RATE: 70 BPM | WEIGHT: 175.4 LBS | TEMPERATURE: 98.9 F | DIASTOLIC BLOOD PRESSURE: 90 MMHG | BODY MASS INDEX: 23.76 KG/M2 | HEIGHT: 72 IN

## 2024-07-09 DIAGNOSIS — F79 INTELLECTUAL DISABILITY: ICD-10-CM

## 2024-07-09 DIAGNOSIS — E78.5 HYPERLIPIDEMIA, UNSPECIFIED HYPERLIPIDEMIA TYPE: ICD-10-CM

## 2024-07-09 DIAGNOSIS — Z79.4 TYPE 2 DIABETES MELLITUS WITH HYPERGLYCEMIA, WITH LONG-TERM CURRENT USE OF INSULIN (HCC): Primary | ICD-10-CM

## 2024-07-09 DIAGNOSIS — R03.0 ELEVATED BLOOD PRESSURE READING: ICD-10-CM

## 2024-07-09 DIAGNOSIS — E11.65 TYPE 2 DIABETES MELLITUS WITH HYPERGLYCEMIA, WITH LONG-TERM CURRENT USE OF INSULIN (HCC): Primary | ICD-10-CM

## 2024-07-09 DIAGNOSIS — C61 MALIGNANT NEOPLASM OF PROSTATE (HCC): ICD-10-CM

## 2024-07-09 DIAGNOSIS — Z86.711 HISTORY OF PULMONARY EMBOLISM: ICD-10-CM

## 2024-07-09 LAB — HBA1C MFR BLD: 9.9 %

## 2024-07-09 PROCEDURE — 99214 OFFICE O/P EST MOD 30 MIN: CPT | Performed by: STUDENT IN AN ORGANIZED HEALTH CARE EDUCATION/TRAINING PROGRAM

## 2024-07-09 PROCEDURE — 83036 HEMOGLOBIN GLYCOSYLATED A1C: CPT | Performed by: STUDENT IN AN ORGANIZED HEALTH CARE EDUCATION/TRAINING PROGRAM

## 2024-07-09 PROCEDURE — 1123F ACP DISCUSS/DSCN MKR DOCD: CPT | Performed by: STUDENT IN AN ORGANIZED HEALTH CARE EDUCATION/TRAINING PROGRAM

## 2024-07-09 RX ORDER — APIXABAN 5 MG/1
5 TABLET, FILM COATED ORAL 2 TIMES DAILY
Qty: 180 TABLET | Refills: 3 | Status: SHIPPED | OUTPATIENT
Start: 2024-07-09

## 2024-07-09 RX ORDER — INSULIN GLARGINE 100 [IU]/ML
35 INJECTION, SOLUTION SUBCUTANEOUS NIGHTLY
Qty: 10 ML | Refills: 2 | Status: SHIPPED | OUTPATIENT
Start: 2024-07-09

## 2024-07-09 RX ORDER — ROSUVASTATIN CALCIUM 20 MG/1
20 TABLET, COATED ORAL DAILY
Qty: 90 TABLET | Refills: 1 | Status: SHIPPED | OUTPATIENT
Start: 2024-07-09

## 2024-07-09 RX ORDER — ASPIRIN 81 MG/1
81 TABLET ORAL EVERY MORNING
Qty: 90 TABLET | Refills: 1 | Status: SHIPPED | OUTPATIENT
Start: 2024-07-09

## 2024-07-09 ASSESSMENT — ENCOUNTER SYMPTOMS
ABDOMINAL PAIN: 0
CONSTIPATION: 0
SHORTNESS OF BREATH: 0

## 2024-07-09 NOTE — PROGRESS NOTES
\"Have you been to the ER, urgent care clinic since your last visit?  Hospitalized since your last visit?\"    NO    “Have you seen or consulted any other health care providers outside of [facility] since your last visit?”    NO    Chief Complaint   Patient presents with    Follow-up Chronic Condition     BP (!) 166/93 (Site: Left Upper Arm, Position: Sitting, Cuff Size: Medium Adult)   Pulse 91   Temp 98.9 °F (37.2 °C) (Oral)   Resp 18   Ht 1.829 m (6')   Wt 79.6 kg (175 lb 6.4 oz)   SpO2 94%   BMI 23.79 kg/m²               Click Here for Release of Records Request   
        SUBJECTIVE/OBJECTIVE:  RAJESH Sal Jr. Is a 69 yr old who presents to the clinic for a follow up.   He has a history of cognitive disability and poor historian.   He has not brought his medication bottles.  He has diabetes, hx of pulmonuoary embolism on lifelong Eliquis, hyperlipidemia.  He does have a history of prostate cancer, following with Dr. Araujo. Currently on Orgovyx.   He states he is only taking Lantus 30 units.  He states his blood sugars at home are 130s to 140s.    No Known Allergies  Current Outpatient Medications   Medication Sig Dispense Refill    insulin glargine (LANTUS) 100 UNIT/ML injection vial Inject 35 Units into the skin nightly 10 mL 2    rosuvastatin (CRESTOR) 20 MG tablet Take 1 tablet by mouth daily 90 tablet 1    metFORMIN (GLUCOPHAGE) 1000 MG tablet Take 1 tablet by mouth 2 times daily (with meals) with meals 180 tablet 1    empagliflozin (JARDIANCE) 25 MG tablet Take 1 tablet by mouth daily . 90 tablet 1    ELIQUIS 5 MG TABS tablet Take 1 tablet by mouth 2 times daily 180 tablet 3    aspirin (ASPIRIN LOW DOSE) 81 MG EC tablet Take 1 tablet by mouth every morning 90 tablet 1    Relugolix (ORGOVYX) 120 MG TABS Take 120 mg by mouth daily 30 tablet 5    Relugolix (ORGOVYX) 120 MG TABS Take 120 mg by mouth daily 30 tablet 5    EASY TOUCH INSULIN SYRINGE 29G X 1/2\" 1 ML MISC USE DAILY WITH INSULIN 100 each 3    blood glucose test strips (ASCENSIA AUTODISC VI;ONE TOUCH ULTRA TEST VI) strip Use to check glucose once daily before breakfast      Lancets MISC Use to check glucose once daily before breakfast       No current facility-administered medications for this visit.      Past Medical History:   Diagnosis Date    Arthritis     Cancer (HCC)     Diabetes (HCC)     Elevated prostate specific antigen (PSA) 6/25/2020    He is referred from Dr. Gale for elevated PSA. 5/14/2020: 36.8 with 4.3% free. 2/10/2020: 36.3 1/2/2020: 29.5 Right prostate firm and enlarged. 06-

## 2024-09-26 DIAGNOSIS — Z79.4 TYPE 2 DIABETES MELLITUS WITH HYPERGLYCEMIA, WITH LONG-TERM CURRENT USE OF INSULIN (HCC): ICD-10-CM

## 2024-09-26 DIAGNOSIS — E11.65 TYPE 2 DIABETES MELLITUS WITH HYPERGLYCEMIA, WITH LONG-TERM CURRENT USE OF INSULIN (HCC): ICD-10-CM

## 2024-09-28 RX ORDER — INSULIN GLARGINE 100 [IU]/ML
35 INJECTION, SOLUTION SUBCUTANEOUS NIGHTLY
Qty: 10 ML | Refills: 3 | Status: SHIPPED | OUTPATIENT
Start: 2024-09-28

## 2024-10-30 ENCOUNTER — OFFICE VISIT (OUTPATIENT)
Facility: CLINIC | Age: 69
End: 2024-10-30
Payer: MEDICAID

## 2024-10-30 VITALS
WEIGHT: 172.6 LBS | RESPIRATION RATE: 18 BRPM | HEART RATE: 87 BPM | BODY MASS INDEX: 23.38 KG/M2 | HEIGHT: 72 IN | SYSTOLIC BLOOD PRESSURE: 126 MMHG | OXYGEN SATURATION: 99 % | DIASTOLIC BLOOD PRESSURE: 80 MMHG | TEMPERATURE: 98.2 F

## 2024-10-30 DIAGNOSIS — Z86.711 HISTORY OF PULMONARY EMBOLISM: ICD-10-CM

## 2024-10-30 DIAGNOSIS — E11.65 TYPE 2 DIABETES MELLITUS WITH HYPERGLYCEMIA, WITH LONG-TERM CURRENT USE OF INSULIN (HCC): Primary | ICD-10-CM

## 2024-10-30 DIAGNOSIS — Z79.4 TYPE 2 DIABETES MELLITUS WITH HYPERGLYCEMIA, WITH LONG-TERM CURRENT USE OF INSULIN (HCC): Primary | ICD-10-CM

## 2024-10-30 DIAGNOSIS — F79 INTELLECTUAL DISABILITY: ICD-10-CM

## 2024-10-30 DIAGNOSIS — E78.5 HYPERLIPIDEMIA, UNSPECIFIED HYPERLIPIDEMIA TYPE: ICD-10-CM

## 2024-10-30 DIAGNOSIS — C61 MALIGNANT NEOPLASM OF PROSTATE (HCC): ICD-10-CM

## 2024-10-30 DIAGNOSIS — F84.0 AUTISM: ICD-10-CM

## 2024-10-30 LAB — HBA1C MFR BLD: 12.5 % (ref 4.8–5.6)

## 2024-10-30 PROCEDURE — 3046F HEMOGLOBIN A1C LEVEL >9.0%: CPT | Performed by: STUDENT IN AN ORGANIZED HEALTH CARE EDUCATION/TRAINING PROGRAM

## 2024-10-30 PROCEDURE — 99214 OFFICE O/P EST MOD 30 MIN: CPT | Performed by: STUDENT IN AN ORGANIZED HEALTH CARE EDUCATION/TRAINING PROGRAM

## 2024-10-30 PROCEDURE — 1123F ACP DISCUSS/DSCN MKR DOCD: CPT | Performed by: STUDENT IN AN ORGANIZED HEALTH CARE EDUCATION/TRAINING PROGRAM

## 2024-10-30 RX ORDER — INSULIN GLARGINE 100 [IU]/ML
40 INJECTION, SOLUTION SUBCUTANEOUS NIGHTLY
Qty: 10 ML | Refills: 3 | Status: SHIPPED | OUTPATIENT
Start: 2024-10-30

## 2024-10-30 RX ORDER — INSULIN LISPRO 100 [IU]/ML
5 INJECTION, SOLUTION INTRAVENOUS; SUBCUTANEOUS
Qty: 5 ADJUSTABLE DOSE PRE-FILLED PEN SYRINGE | Refills: 2 | Status: SHIPPED | OUTPATIENT
Start: 2024-10-30

## 2024-10-30 SDOH — ECONOMIC STABILITY: FOOD INSECURITY: WITHIN THE PAST 12 MONTHS, YOU WORRIED THAT YOUR FOOD WOULD RUN OUT BEFORE YOU GOT MONEY TO BUY MORE.: NEVER TRUE

## 2024-10-30 SDOH — ECONOMIC STABILITY: FOOD INSECURITY: WITHIN THE PAST 12 MONTHS, THE FOOD YOU BOUGHT JUST DIDN'T LAST AND YOU DIDN'T HAVE MONEY TO GET MORE.: NEVER TRUE

## 2024-10-30 SDOH — ECONOMIC STABILITY: INCOME INSECURITY: HOW HARD IS IT FOR YOU TO PAY FOR THE VERY BASICS LIKE FOOD, HOUSING, MEDICAL CARE, AND HEATING?: NOT HARD AT ALL

## 2024-10-30 ASSESSMENT — ENCOUNTER SYMPTOMS
SHORTNESS OF BREATH: 0
ABDOMINAL PAIN: 0

## 2024-10-30 NOTE — PROGRESS NOTES
\"Have you been to the ER, urgent care clinic since your last visit?  Hospitalized since your last visit?\"    NO    “Have you seen or consulted any other health care providers outside of [facility] since your last visit?”    NO    Chief Complaint   Patient presents with    Follow-up Chronic Condition     /80 (Site: Right Upper Arm, Position: Sitting, Cuff Size: Medium Adult)   Pulse 87   Temp 98.2 °F (36.8 °C) (Temporal)   Resp 18   Ht 1.829 m (6')   Wt 78.3 kg (172 lb 9.6 oz)   SpO2 99%   BMI 23.41 kg/m²               Click Here for Release of Records Request

## 2024-10-30 NOTE — PROGRESS NOTES
Kaiser Sal Jr. (: 1955) is a 69 y.o. male, Established patient patient, here for evaluation of the following chief complaint(s):  Follow-up Chronic Condition       ASSESSMENT/PLAN:  Below is the assessment and plan developed based on review of pertinent history, physical exam, labs, studies, and medications.    1. Type 2 diabetes mellitus with hyperglycemia, with long-term current use of insulin (HCC)  -     insulin glargine (LANTUS) 100 UNIT/ML injection vial; Inject 40 Units into the skin at bedtime, Disp-10 mL, R-3Normal  -     insulin lispro, 1 Unit Dial, (HUMALOG KWIKPEN) 100 UNIT/ML SOPN; Inject 5 Units into the skin 3 times daily (before meals), Disp-5 Adjustable Dose Pre-filled Pen Syringe, R-2Normal  2. Malignant neoplasm of prostate (HCC)  3. History of pulmonary embolism  4. Intellectual disability  5. Autism  6. Hyperlipidemia, unspecified hyperlipidemia type    Brought a copy of medication list, states he is taking the medications.    Diabetes: Recent A1c that was checked on 10/29/2024, yesterday was 12.5.  Uncontrolled, unsure if he is really taking the medications.  He states he is.  Increase Lantus to 40 units, also started on Humalog premeals 5 units.  Explained it to him, written down and given instructions.  He does have mild intellectual disability, although he lives by himself and takes care of his own medications.    Needs to make follow up with ophthalmology     Blood work as above.  Up-to-date with Jen.  Return in about 4 months (around 2025) for follow up.         SUBJECTIVE/OBJECTIVE:  HPI    Kaiser Sal JrBranden s a 69 yr old who presents to the clinic for a follow up.   He has a history of cognitive disability and poor historian.   He has diabetes, hx of pulmonuoary embolism on lifelong Eliquis, hyperlipidemia.  He does have a history of prostate cancer, following with Dr. Araujo. Currently on Orgovyx.   He states he is only taking Lantus 35 units, metformin and

## 2024-11-22 DIAGNOSIS — E11.65 TYPE 2 DIABETES MELLITUS WITH HYPERGLYCEMIA, WITH LONG-TERM CURRENT USE OF INSULIN (HCC): ICD-10-CM

## 2024-11-22 DIAGNOSIS — Z79.4 TYPE 2 DIABETES MELLITUS WITH HYPERGLYCEMIA, WITH LONG-TERM CURRENT USE OF INSULIN (HCC): ICD-10-CM

## 2024-11-24 RX ORDER — EMPAGLIFLOZIN 25 MG/1
25 TABLET, FILM COATED ORAL DAILY
Qty: 90 TABLET | Refills: 1 | Status: SHIPPED | OUTPATIENT
Start: 2024-11-24

## 2024-12-10 LAB — PSA SERPL-MCNC: <0.1 NG/ML (ref 0–4)

## 2024-12-11 DIAGNOSIS — C61 MALIGNANT NEOPLASM OF PROSTATE (HCC): ICD-10-CM

## 2024-12-11 DIAGNOSIS — R97.21 INCREASING PSA LEVEL AFTER TREATMENT FOR PROSTATE CANCER: ICD-10-CM

## 2024-12-11 PROBLEM — O22.30 DVT (DEEP VEIN THROMBOSIS) IN PREGNANCY: Status: ACTIVE | Noted: 2024-12-11

## 2024-12-12 ENCOUNTER — OFFICE VISIT (OUTPATIENT)
Age: 69
End: 2024-12-12
Payer: MEDICAID

## 2024-12-12 VITALS
HEIGHT: 72 IN | HEART RATE: 94 BPM | WEIGHT: 179 LBS | BODY MASS INDEX: 24.24 KG/M2 | DIASTOLIC BLOOD PRESSURE: 71 MMHG | SYSTOLIC BLOOD PRESSURE: 117 MMHG

## 2024-12-12 DIAGNOSIS — I26.92 SADDLE EMBOLUS OF PULMONARY ARTERY WITHOUT ACUTE COR PULMONALE, UNSPECIFIED CHRONICITY (HCC): ICD-10-CM

## 2024-12-12 DIAGNOSIS — C61 MALIGNANT NEOPLASM OF PROSTATE (HCC): Primary | ICD-10-CM

## 2024-12-12 DIAGNOSIS — F84.0 AUTISM: ICD-10-CM

## 2024-12-12 DIAGNOSIS — R97.21 INCREASING PSA LEVEL AFTER TREATMENT FOR PROSTATE CANCER: ICD-10-CM

## 2024-12-12 PROBLEM — I82.4Y1 DEEP VEIN THROMBOSIS (DVT) OF PROXIMAL VEIN OF RIGHT LOWER EXTREMITY (HCC): Status: ACTIVE | Noted: 2021-12-07

## 2024-12-12 PROBLEM — O22.30 DVT (DEEP VEIN THROMBOSIS) IN PREGNANCY: Status: RESOLVED | Noted: 2024-12-11 | Resolved: 2024-12-12

## 2024-12-12 PROBLEM — Q28.3 CEREBRAL CAVERNOUS MALFORMATION: Status: ACTIVE | Noted: 2021-12-07

## 2024-12-12 PROBLEM — E11.9 DIABETES MELLITUS (HCC): Status: ACTIVE | Noted: 2021-12-06

## 2024-12-12 PROBLEM — Z79.4 TYPE 2 DIABETES MELLITUS WITH HYPERGLYCEMIA, WITH LONG-TERM CURRENT USE OF INSULIN (HCC): Status: RESOLVED | Noted: 2023-05-23 | Resolved: 2024-12-12

## 2024-12-12 PROBLEM — R32 URINARY INCONTINENCE: Status: ACTIVE | Noted: 2020-08-17

## 2024-12-12 PROBLEM — E11.65 TYPE 2 DIABETES MELLITUS WITH HYPERGLYCEMIA, WITH LONG-TERM CURRENT USE OF INSULIN (HCC): Status: RESOLVED | Noted: 2023-05-23 | Resolved: 2024-12-12

## 2024-12-12 PROBLEM — D64.9 ANEMIA: Status: ACTIVE | Noted: 2021-12-08

## 2024-12-12 PROBLEM — Q28.2 CEREBRAL ARTERIOVENOUS MALFORMATION (AVM): Status: ACTIVE | Noted: 2021-12-06

## 2024-12-12 PROBLEM — D75.839 THROMBOCYTHEMIA: Status: ACTIVE | Noted: 2021-12-08

## 2024-12-12 PROBLEM — R32 URINARY INCONTINENCE: Status: RESOLVED | Noted: 2020-08-17 | Resolved: 2024-12-12

## 2024-12-12 PROCEDURE — 99214 OFFICE O/P EST MOD 30 MIN: CPT | Performed by: UROLOGY

## 2024-12-12 PROCEDURE — 1123F ACP DISCUSS/DSCN MKR DOCD: CPT | Performed by: UROLOGY

## 2024-12-12 ASSESSMENT — PATIENT HEALTH QUESTIONNAIRE - PHQ9
SUM OF ALL RESPONSES TO PHQ9 QUESTIONS 1 & 2: 0
2. FEELING DOWN, DEPRESSED OR HOPELESS: NOT AT ALL
SUM OF ALL RESPONSES TO PHQ QUESTIONS 1-9: 0
1. LITTLE INTEREST OR PLEASURE IN DOING THINGS: NOT AT ALL
SUM OF ALL RESPONSES TO PHQ QUESTIONS 1-9: 0

## 2024-12-12 NOTE — ASSESSMENT & PLAN NOTE
He had high risk prostate cancer status post prostatectomy 8/10/2020.  He had PSA recurrence 4/21/2022.  He was started on androgen division therapy February 2023.  His PSA has been undetectable on medication.  I reassured him that he is low risk for dying of prostate cancer in the foreseeable future.  I recommend continue medication.

## 2024-12-12 NOTE — ASSESSMENT & PLAN NOTE
He has a history of autism.  He lives independently.  I reassured him that he is at low risk of dying from prostate cancer

## 2024-12-12 NOTE — PROGRESS NOTES
Chief Complaint   Patient presents with    Follow-up     Malignant neoplasm of prostate     1. Have you been to the ER, urgent care clinic since your last visit?  Hospitalized since your last visit?No    2. Have you seen or consulted any other health care providers outside of the [facility] since your last visit?  Include any pap smears or colon screening. No    /71 (Site: Left Upper Arm, Position: Sitting, Cuff Size: Medium Adult)   Pulse 94   Ht 1.829 m (6')   Wt 81.2 kg (179 lb)   BMI 24.28 kg/m²     
recall a PE.  He is on Eliquis.    4. Autism  Assessment & Plan:  He has a history of autism.  He lives independently.  I reassured him that he is at low risk of dying from prostate cancer      Return in about 6 months (around 6/12/2025) for PSA prior.   Marko Araujo MD       Please note that portions of this note was potentially completed with Dragon dictation, the computer voice recognition software.  Quite often unanticipated grammatical, syntax, homophones, and other interpretive errors are inadvertently transcribed by the computer software.  Please disregard these errors.  Please excuse any errors that have escaped final proofreading.  Thank you.

## 2025-02-25 DIAGNOSIS — E78.5 HYPERLIPIDEMIA, UNSPECIFIED HYPERLIPIDEMIA TYPE: ICD-10-CM

## 2025-02-25 DIAGNOSIS — E11.65 TYPE 2 DIABETES MELLITUS WITH HYPERGLYCEMIA, WITH LONG-TERM CURRENT USE OF INSULIN (HCC): ICD-10-CM

## 2025-02-25 DIAGNOSIS — Z79.4 TYPE 2 DIABETES MELLITUS WITH HYPERGLYCEMIA, WITH LONG-TERM CURRENT USE OF INSULIN (HCC): ICD-10-CM

## 2025-02-26 RX ORDER — ROSUVASTATIN CALCIUM 20 MG/1
20 TABLET, COATED ORAL DAILY
Qty: 90 TABLET | Refills: 0 | Status: SHIPPED | OUTPATIENT
Start: 2025-02-26

## 2025-02-26 RX ORDER — ASPIRIN 81 MG/1
81 TABLET, COATED ORAL EVERY MORNING
Qty: 90 TABLET | Refills: 1 | Status: SHIPPED | OUTPATIENT
Start: 2025-02-26

## 2025-02-26 RX ORDER — INSULIN GLARGINE 100 [IU]/ML
INJECTION, SOLUTION SUBCUTANEOUS
Qty: 10 ML | Refills: 2 | Status: SHIPPED | OUTPATIENT
Start: 2025-02-26

## 2025-03-06 ENCOUNTER — OFFICE VISIT (OUTPATIENT)
Facility: CLINIC | Age: 70
End: 2025-03-06
Payer: MEDICAID

## 2025-03-06 VITALS
SYSTOLIC BLOOD PRESSURE: 123 MMHG | HEIGHT: 72 IN | WEIGHT: 174.8 LBS | HEART RATE: 89 BPM | OXYGEN SATURATION: 100 % | BODY MASS INDEX: 23.68 KG/M2 | DIASTOLIC BLOOD PRESSURE: 78 MMHG | TEMPERATURE: 97.9 F | RESPIRATION RATE: 18 BRPM

## 2025-03-06 DIAGNOSIS — Z86.711 HISTORY OF PULMONARY EMBOLISM: ICD-10-CM

## 2025-03-06 DIAGNOSIS — Z91.199 NONCOMPLIANCE: ICD-10-CM

## 2025-03-06 DIAGNOSIS — E78.5 HYPERLIPIDEMIA, UNSPECIFIED HYPERLIPIDEMIA TYPE: ICD-10-CM

## 2025-03-06 DIAGNOSIS — C61 MALIGNANT NEOPLASM OF PROSTATE (HCC): ICD-10-CM

## 2025-03-06 DIAGNOSIS — E11.65 TYPE 2 DIABETES MELLITUS WITH HYPERGLYCEMIA, WITH LONG-TERM CURRENT USE OF INSULIN (HCC): Primary | ICD-10-CM

## 2025-03-06 DIAGNOSIS — E11.65 TYPE 2 DIABETES MELLITUS WITH HYPERGLYCEMIA, WITH LONG-TERM CURRENT USE OF INSULIN (HCC): ICD-10-CM

## 2025-03-06 DIAGNOSIS — Z79.4 TYPE 2 DIABETES MELLITUS WITH HYPERGLYCEMIA, WITH LONG-TERM CURRENT USE OF INSULIN (HCC): Primary | ICD-10-CM

## 2025-03-06 DIAGNOSIS — Z79.4 TYPE 2 DIABETES MELLITUS WITH HYPERGLYCEMIA, WITH LONG-TERM CURRENT USE OF INSULIN (HCC): ICD-10-CM

## 2025-03-06 DIAGNOSIS — F79 INTELLECTUAL DISABILITY: ICD-10-CM

## 2025-03-06 LAB — HBA1C MFR BLD: 14.6 %

## 2025-03-06 PROCEDURE — 99214 OFFICE O/P EST MOD 30 MIN: CPT | Performed by: STUDENT IN AN ORGANIZED HEALTH CARE EDUCATION/TRAINING PROGRAM

## 2025-03-06 PROCEDURE — 3046F HEMOGLOBIN A1C LEVEL >9.0%: CPT | Performed by: STUDENT IN AN ORGANIZED HEALTH CARE EDUCATION/TRAINING PROGRAM

## 2025-03-06 PROCEDURE — 1123F ACP DISCUSS/DSCN MKR DOCD: CPT | Performed by: STUDENT IN AN ORGANIZED HEALTH CARE EDUCATION/TRAINING PROGRAM

## 2025-03-06 PROCEDURE — 83036 HEMOGLOBIN GLYCOSYLATED A1C: CPT | Performed by: STUDENT IN AN ORGANIZED HEALTH CARE EDUCATION/TRAINING PROGRAM

## 2025-03-06 RX ORDER — GLIPIZIDE 5 MG/1
5 TABLET ORAL 2 TIMES DAILY
Qty: 60 TABLET | Refills: 3 | Status: SHIPPED | OUTPATIENT
Start: 2025-03-06

## 2025-03-06 RX ORDER — SEMAGLUTIDE 1.34 MG/ML
INJECTION, SOLUTION SUBCUTANEOUS
Qty: 9 ML | Refills: 0 | Status: SHIPPED | OUTPATIENT
Start: 2025-03-06 | End: 2025-06-03

## 2025-03-06 SDOH — ECONOMIC STABILITY: FOOD INSECURITY: WITHIN THE PAST 12 MONTHS, YOU WORRIED THAT YOUR FOOD WOULD RUN OUT BEFORE YOU GOT MONEY TO BUY MORE.: NEVER TRUE

## 2025-03-06 SDOH — ECONOMIC STABILITY: FOOD INSECURITY: WITHIN THE PAST 12 MONTHS, THE FOOD YOU BOUGHT JUST DIDN'T LAST AND YOU DIDN'T HAVE MONEY TO GET MORE.: NEVER TRUE

## 2025-03-06 ASSESSMENT — PATIENT HEALTH QUESTIONNAIRE - PHQ9
SUM OF ALL RESPONSES TO PHQ QUESTIONS 1-9: 0
SUM OF ALL RESPONSES TO PHQ QUESTIONS 1-9: 0
1. LITTLE INTEREST OR PLEASURE IN DOING THINGS: NOT AT ALL
SUM OF ALL RESPONSES TO PHQ QUESTIONS 1-9: 0
2. FEELING DOWN, DEPRESSED OR HOPELESS: NOT AT ALL
SUM OF ALL RESPONSES TO PHQ QUESTIONS 1-9: 0

## 2025-03-06 ASSESSMENT — ENCOUNTER SYMPTOMS
SHORTNESS OF BREATH: 0
COUGH: 0
ABDOMINAL PAIN: 0

## 2025-03-06 NOTE — PROGRESS NOTES
\"Have you been to the ER, urgent care clinic since your last visit?  Hospitalized since your last visit?\"    NO    “Have you seen or consulted any other health care providers outside of [facility] since your last visit?”    NO    Chief Complaint   Patient presents with    Follow-up Chronic Condition     /78 (Site: Right Upper Arm, Position: Sitting, Cuff Size: Medium Adult)   Pulse 89   Temp 97.9 °F (36.6 °C) (Temporal)   Resp 18   Ht 1.829 m (6')   Wt 79.3 kg (174 lb 12.8 oz)   SpO2 100%   BMI 23.71 kg/m²               Click Here for Release of Records Request

## 2025-03-06 NOTE — PROGRESS NOTES
Kaiser Sal Jr. (: 1955) is a 70 y.o. male, Established patient patient, here for evaluation of the following chief complaint(s):  Follow-up Chronic Condition       ASSESSMENT/PLAN:  Below is the assessment and plan developed based on review of pertinent history, physical exam, labs, studies, and medications.    1. Type 2 diabetes mellitus with hyperglycemia, with long-term current use of insulin (HCC)  -     AMB POC HEMOGLOBIN A1C  -     glipiZIDE (GLUCOTROL) 5 MG tablet; Take 1 tablet by mouth 2 times daily, Disp-60 tablet, R-3Normal  -     Semaglutide,0.25 or 0.5MG/DOS, (OZEMPIC, 0.25 OR 0.5 MG/DOSE,) 2 MG/1.5ML SOPN; Inject 0.25 mg into the skin every 7 days for 30 days, THEN 0.5 mg every 7 days., Disp-9 mL, R-0Normal  -     CBC; Future  -     Comprehensive Metabolic Panel; Future  -     Lipid Panel; Future  -     Albumin/Creatinine Ratio, Urine; Future  2. Hyperlipidemia, unspecified hyperlipidemia type  -     Comprehensive Metabolic Panel; Future  -     Lipid Panel; Future  3. History of pulmonary embolism  -     CBC; Future  4. Malignant neoplasm of prostate (HCC)  -     CBC; Future  5. Noncompliance  6. Intellectual disability    Diabetes: Uncontrolled, has not been taking any of the insulin since last 3 months.  Refusing to take insulin, he states that he does not want to use the needles anymore.  Treatment is also complicated by intellectual disability.  Refusing to follow-up with endocrinology.  I have added glipizide, I have also sent in Ozempic, but I am not sure if this is can to be covered by his insurance.  Denies any previous history of pancreatitis or history of thyroid cancer.  Results for orders placed or performed in visit on 25   AMB POC HEMOGLOBIN A1C   Result Value Ref Range    Hemoglobin A1C, POC 14.6 %   He does complain of increased urination, no abdominal pain or nausea or vomiting or dizziness.    Blood work as above.    Return in about 3 months (around 2025) for

## 2025-03-07 LAB
ALBUMIN SERPL-MCNC: 4.7 G/DL (ref 3.9–4.9)
ALBUMIN/CREAT UR: 9 MG/G CREAT (ref 0–29)
ALP SERPL-CCNC: 110 IU/L (ref 44–121)
ALT SERPL-CCNC: 14 IU/L (ref 0–44)
AST SERPL-CCNC: 14 IU/L (ref 0–40)
BILIRUB SERPL-MCNC: 0.5 MG/DL (ref 0–1.2)
BUN SERPL-MCNC: 15 MG/DL (ref 8–27)
BUN/CREAT SERPL: 16 (ref 10–24)
CALCIUM SERPL-MCNC: 9.9 MG/DL (ref 8.6–10.2)
CHLORIDE SERPL-SCNC: 103 MMOL/L (ref 96–106)
CHOLEST SERPL-MCNC: 117 MG/DL (ref 100–199)
CO2 SERPL-SCNC: 20 MMOL/L (ref 20–29)
CREAT SERPL-MCNC: 0.96 MG/DL (ref 0.76–1.27)
CREAT UR-MCNC: 57.6 MG/DL
EGFRCR SERPLBLD CKD-EPI 2021: 86 ML/MIN/1.73
ERYTHROCYTE [DISTWIDTH] IN BLOOD BY AUTOMATED COUNT: 13.3 % (ref 11.6–15.4)
GLOBULIN SER CALC-MCNC: 3.2 G/DL (ref 1.5–4.5)
GLUCOSE SERPL-MCNC: 217 MG/DL (ref 70–99)
HCT VFR BLD AUTO: 43 % (ref 37.5–51)
HDLC SERPL-MCNC: 45 MG/DL
HGB BLD-MCNC: 13.9 G/DL (ref 13–17.7)
LDLC SERPL CALC-MCNC: 50 MG/DL (ref 0–99)
MCH RBC QN AUTO: 28.6 PG (ref 26.6–33)
MCHC RBC AUTO-ENTMCNC: 32.3 G/DL (ref 31.5–35.7)
MCV RBC AUTO: 89 FL (ref 79–97)
MICROALBUMIN UR-MCNC: 4.9 UG/ML
PLATELET # BLD AUTO: 304 X10E3/UL (ref 150–450)
POTASSIUM SERPL-SCNC: 4.5 MMOL/L (ref 3.5–5.2)
PROT SERPL-MCNC: 7.9 G/DL (ref 6–8.5)
RBC # BLD AUTO: 4.86 X10E6/UL (ref 4.14–5.8)
SODIUM SERPL-SCNC: 141 MMOL/L (ref 134–144)
TRIGL SERPL-MCNC: 127 MG/DL (ref 0–149)
VLDLC SERPL CALC-MCNC: 22 MG/DL (ref 5–40)
WBC # BLD AUTO: 4.5 X10E3/UL (ref 3.4–10.8)

## 2025-03-14 ENCOUNTER — RESULTS FOLLOW-UP (OUTPATIENT)
Facility: CLINIC | Age: 70
End: 2025-03-14

## 2025-03-26 RX ORDER — SYRINGE AND NEEDLE,INSULIN,1ML 29 G X1/2"
SYRINGE, EMPTY DISPOSABLE MISCELLANEOUS
Qty: 30 EACH | Refills: 0 | OUTPATIENT
Start: 2025-03-26

## 2025-05-20 DIAGNOSIS — E11.65 TYPE 2 DIABETES MELLITUS WITH HYPERGLYCEMIA, WITH LONG-TERM CURRENT USE OF INSULIN (HCC): ICD-10-CM

## 2025-05-20 DIAGNOSIS — Z79.4 TYPE 2 DIABETES MELLITUS WITH HYPERGLYCEMIA, WITH LONG-TERM CURRENT USE OF INSULIN (HCC): ICD-10-CM

## 2025-05-20 DIAGNOSIS — E78.5 HYPERLIPIDEMIA, UNSPECIFIED HYPERLIPIDEMIA TYPE: ICD-10-CM

## 2025-05-20 DIAGNOSIS — Z86.711 HISTORY OF PULMONARY EMBOLISM: ICD-10-CM

## 2025-05-21 RX ORDER — APIXABAN 5 MG/1
5 TABLET, FILM COATED ORAL 2 TIMES DAILY
Qty: 180 TABLET | Refills: 2 | Status: SHIPPED | OUTPATIENT
Start: 2025-05-21

## 2025-05-21 RX ORDER — EMPAGLIFLOZIN 25 MG/1
25 TABLET, FILM COATED ORAL DAILY
Qty: 90 TABLET | Refills: 2 | Status: SHIPPED | OUTPATIENT
Start: 2025-05-21

## 2025-05-21 RX ORDER — ROSUVASTATIN CALCIUM 20 MG/1
20 TABLET, COATED ORAL DAILY
Qty: 90 TABLET | Refills: 2 | Status: SHIPPED | OUTPATIENT
Start: 2025-05-21

## 2025-05-21 RX ORDER — INSULIN GLARGINE 100 [IU]/ML
INJECTION, SOLUTION SUBCUTANEOUS
Qty: 10 ML | Refills: 0 | OUTPATIENT
Start: 2025-05-21

## 2025-05-30 DIAGNOSIS — E11.65 TYPE 2 DIABETES MELLITUS WITH HYPERGLYCEMIA, WITH LONG-TERM CURRENT USE OF INSULIN (HCC): ICD-10-CM

## 2025-05-30 DIAGNOSIS — Z79.4 TYPE 2 DIABETES MELLITUS WITH HYPERGLYCEMIA, WITH LONG-TERM CURRENT USE OF INSULIN (HCC): ICD-10-CM

## 2025-05-30 RX ORDER — INSULIN GLARGINE 100 [IU]/ML
INJECTION, SOLUTION SUBCUTANEOUS
Qty: 10 ML | Refills: 0 | OUTPATIENT
Start: 2025-05-30

## 2025-06-07 DIAGNOSIS — Z79.4 TYPE 2 DIABETES MELLITUS WITH HYPERGLYCEMIA, WITH LONG-TERM CURRENT USE OF INSULIN (HCC): ICD-10-CM

## 2025-06-07 DIAGNOSIS — E11.65 TYPE 2 DIABETES MELLITUS WITH HYPERGLYCEMIA, WITH LONG-TERM CURRENT USE OF INSULIN (HCC): ICD-10-CM

## 2025-06-10 RX ORDER — INSULIN GLARGINE 100 [IU]/ML
INJECTION, SOLUTION SUBCUTANEOUS
Qty: 10 ML | Refills: 0 | OUTPATIENT
Start: 2025-06-10

## 2025-06-12 DIAGNOSIS — R97.21 INCREASING PSA LEVEL AFTER TREATMENT FOR PROSTATE CANCER: ICD-10-CM

## 2025-06-12 DIAGNOSIS — C61 MALIGNANT NEOPLASM OF PROSTATE (HCC): ICD-10-CM

## 2025-06-12 DIAGNOSIS — E11.65 TYPE 2 DIABETES MELLITUS WITH HYPERGLYCEMIA, WITH LONG-TERM CURRENT USE OF INSULIN (HCC): ICD-10-CM

## 2025-06-12 DIAGNOSIS — Z79.4 TYPE 2 DIABETES MELLITUS WITH HYPERGLYCEMIA, WITH LONG-TERM CURRENT USE OF INSULIN (HCC): ICD-10-CM

## 2025-06-12 LAB
ALBUMIN SERPL-MCNC: 4.7 G/DL (ref 3.9–4.9)
ALP SERPL-CCNC: 88 IU/L (ref 44–121)
ALT SERPL-CCNC: 17 IU/L (ref 0–44)
AST SERPL-CCNC: 13 IU/L (ref 0–40)
BILIRUB SERPL-MCNC: 0.5 MG/DL (ref 0–1.2)
BUN SERPL-MCNC: 32 MG/DL (ref 8–27)
BUN/CREAT SERPL: 32 (ref 10–24)
CALCIUM SERPL-MCNC: 10.1 MG/DL (ref 8.6–10.2)
CHLORIDE SERPL-SCNC: 103 MMOL/L (ref 96–106)
CHOLEST SERPL-MCNC: 109 MG/DL (ref 100–199)
CO2 SERPL-SCNC: 19 MMOL/L (ref 20–29)
CREAT SERPL-MCNC: 1.01 MG/DL (ref 0.76–1.27)
EGFRCR SERPLBLD CKD-EPI 2021: 81 ML/MIN/1.73
ERYTHROCYTE [DISTWIDTH] IN BLOOD BY AUTOMATED COUNT: 14.6 % (ref 11.6–15.4)
GLOBULIN SER CALC-MCNC: 3.2 G/DL (ref 1.5–4.5)
GLUCOSE SERPL-MCNC: 126 MG/DL (ref 70–99)
HCT VFR BLD AUTO: 41.5 % (ref 37.5–51)
HDLC SERPL-MCNC: 45 MG/DL
HGB BLD-MCNC: 13 G/DL (ref 13–17.7)
LDLC SERPL CALC-MCNC: 49 MG/DL (ref 0–99)
MCH RBC QN AUTO: 28.5 PG (ref 26.6–33)
MCHC RBC AUTO-ENTMCNC: 31.3 G/DL (ref 31.5–35.7)
MCV RBC AUTO: 91 FL (ref 79–97)
PLATELET # BLD AUTO: 337 X10E3/UL (ref 150–450)
POTASSIUM SERPL-SCNC: 5.3 MMOL/L (ref 3.5–5.2)
PROT SERPL-MCNC: 7.9 G/DL (ref 6–8.5)
RBC # BLD AUTO: 4.56 X10E6/UL (ref 4.14–5.8)
SODIUM SERPL-SCNC: 144 MMOL/L (ref 134–144)
TRIGL SERPL-MCNC: 69 MG/DL (ref 0–149)
VLDLC SERPL CALC-MCNC: 15 MG/DL (ref 5–40)
WBC # BLD AUTO: 5.4 X10E3/UL (ref 3.4–10.8)

## 2025-06-13 RX ORDER — GLIPIZIDE 5 MG/1
5 TABLET ORAL 2 TIMES DAILY
Qty: 180 TABLET | Refills: 2 | Status: SHIPPED | OUTPATIENT
Start: 2025-06-13

## 2025-06-23 ENCOUNTER — OFFICE VISIT (OUTPATIENT)
Facility: CLINIC | Age: 70
End: 2025-06-23
Payer: MEDICAID

## 2025-06-23 VITALS
WEIGHT: 177.2 LBS | BODY MASS INDEX: 24 KG/M2 | RESPIRATION RATE: 18 BRPM | HEART RATE: 90 BPM | TEMPERATURE: 99 F | DIASTOLIC BLOOD PRESSURE: 80 MMHG | HEIGHT: 72 IN | SYSTOLIC BLOOD PRESSURE: 155 MMHG | OXYGEN SATURATION: 100 %

## 2025-06-23 DIAGNOSIS — Z79.4 TYPE 2 DIABETES MELLITUS WITH HYPERGLYCEMIA, WITH LONG-TERM CURRENT USE OF INSULIN (HCC): Primary | ICD-10-CM

## 2025-06-23 DIAGNOSIS — E87.5 HYPERKALEMIA: ICD-10-CM

## 2025-06-23 DIAGNOSIS — F79 INTELLECTUAL DISABILITY: ICD-10-CM

## 2025-06-23 DIAGNOSIS — E11.65 TYPE 2 DIABETES MELLITUS WITH HYPERGLYCEMIA, WITH LONG-TERM CURRENT USE OF INSULIN (HCC): Primary | ICD-10-CM

## 2025-06-23 DIAGNOSIS — E78.5 HYPERLIPIDEMIA, UNSPECIFIED HYPERLIPIDEMIA TYPE: ICD-10-CM

## 2025-06-23 DIAGNOSIS — C61 MALIGNANT NEOPLASM OF PROSTATE (HCC): ICD-10-CM

## 2025-06-23 DIAGNOSIS — Z86.711 HISTORY OF PULMONARY EMBOLISM: ICD-10-CM

## 2025-06-23 LAB — HBA1C MFR BLD: 9 %

## 2025-06-23 PROCEDURE — 99214 OFFICE O/P EST MOD 30 MIN: CPT | Performed by: STUDENT IN AN ORGANIZED HEALTH CARE EDUCATION/TRAINING PROGRAM

## 2025-06-23 PROCEDURE — 3052F HG A1C>EQUAL 8.0%<EQUAL 9.0%: CPT | Performed by: STUDENT IN AN ORGANIZED HEALTH CARE EDUCATION/TRAINING PROGRAM

## 2025-06-23 PROCEDURE — 83036 HEMOGLOBIN GLYCOSYLATED A1C: CPT | Performed by: STUDENT IN AN ORGANIZED HEALTH CARE EDUCATION/TRAINING PROGRAM

## 2025-06-23 PROCEDURE — 1123F ACP DISCUSS/DSCN MKR DOCD: CPT | Performed by: STUDENT IN AN ORGANIZED HEALTH CARE EDUCATION/TRAINING PROGRAM

## 2025-06-23 RX ORDER — ROSUVASTATIN CALCIUM 20 MG/1
20 TABLET, COATED ORAL DAILY
Qty: 90 TABLET | Refills: 2 | Status: SHIPPED | OUTPATIENT
Start: 2025-06-23

## 2025-06-23 RX ORDER — INSULIN GLARGINE 100 [IU]/ML
INJECTION, SOLUTION SUBCUTANEOUS
COMMUNITY
Start: 2025-04-22

## 2025-06-23 RX ORDER — GLIPIZIDE 10 MG/1
10 TABLET ORAL
Qty: 180 TABLET | Refills: 2 | Status: SHIPPED | OUTPATIENT
Start: 2025-06-23

## 2025-06-23 RX ORDER — AVOBENZONE, HOMOSALATE, OCTISALATE, OCTOCRYLENE 30; 40; 45; 26 MG/ML; MG/ML; MG/ML; MG/ML
CREAM TOPICAL
Qty: 100 EACH | Refills: 5 | Status: SHIPPED | OUTPATIENT
Start: 2025-06-23

## 2025-06-23 RX ORDER — APIXABAN 5 MG/1
5 TABLET, FILM COATED ORAL 2 TIMES DAILY
Qty: 180 TABLET | Refills: 3 | Status: SHIPPED | OUTPATIENT
Start: 2025-06-23

## 2025-06-23 ASSESSMENT — ENCOUNTER SYMPTOMS
ABDOMINAL PAIN: 0
SHORTNESS OF BREATH: 0

## 2025-06-23 NOTE — PROGRESS NOTES
Kaiser Sal Jr. (: 1955) is a 70 y.o. male, Established patient patient, here for evaluation of the following chief complaint(s):  Follow-up Chronic Condition       ASSESSMENT/PLAN:  Below is the assessment and plan developed based on review of pertinent history, physical exam, labs, studies, and medications.    1. Type 2 diabetes mellitus with hyperglycemia, with long-term current use of insulin (HCC)  -     AMB POC HEMOGLOBIN A1C  -     glipiZIDE (GLUCOTROL) 10 MG tablet; Take 1 tablet by mouth 2 times daily (before meals), Disp-180 tablet, R-2Normal  -     empagliflozin (JARDIANCE) 25 MG tablet; Take 1 tablet by mouth daily, Disp-90 tablet, R-2Normal  -     metFORMIN (GLUCOPHAGE) 1000 MG tablet; Take 1 tablet by mouth 2 times daily (with meals) with meals, Disp-180 tablet, R-2Normal  2. History of pulmonary embolism  -     ELIQUIS 5 MG TABS tablet; Take 1 tablet by mouth 2 times daily, Disp-180 tablet, R-3, DAWNormal  3. Hyperlipidemia, unspecified hyperlipidemia type  -     rosuvastatin (CRESTOR) 20 MG tablet; Take 1 tablet by mouth daily, Disp-90 tablet, R-2Normal  4. Hyperkalemia  -     Basic Metabolic Panel; Future  5. Malignant neoplasm of prostate (HCC)  6. Intellectual disability    Diabetes: A1c is better, but still uncontrolled, increased glipizide as above.  Patient does not want to take insulin.  Results for orders placed or performed in visit on 25   AMB POC HEMOGLOBIN A1C   Result Value Ref Range    Hemoglobin A1C, POC 9.0 %     Refills and blood work as above  Up-to-date with Jen, follow-up with urology    No follow-ups on file.         SUBJECTIVE/OBJECTIVE:  HPI    Kaiser Sal Jr. Is a 70 yr old who presents for a follow up.   He has a history of cognitive disability and poor historian.   He has diabetes, hx of pulmonuoary embolism on lifelong Eliquis, hyperlipidemia.  He does have a history of prostate cancer, following with Dr. Araujo. Currently on Orgovyx.   He did not

## 2025-06-23 NOTE — PROGRESS NOTES
\"Have you been to the ER, urgent care clinic since your last visit?  Hospitalized since your last visit?\"    NO    “Have you seen or consulted any other health care providers outside of [facility] since your last visit?”    NO    Chief Complaint   Patient presents with    Follow-up Chronic Condition     BP (!) 155/80 (BP Site: Left Upper Arm, Patient Position: Sitting, BP Cuff Size: Medium Adult)   Pulse 90   Temp 99 °F (37.2 °C) (Temporal)   Resp 18   Ht 1.829 m (6')   Wt 80.4 kg (177 lb 3.2 oz)   SpO2 100%   BMI 24.03 kg/m²               Click Here for Release of Records Request

## 2025-07-11 PROBLEM — N52.9 IMPOTENCE: Status: RESOLVED | Noted: 2020-11-23 | Resolved: 2025-07-11

## 2025-07-11 LAB — PSA SERPL-MCNC: <0.1 NG/ML (ref 0–4)

## 2025-07-18 ENCOUNTER — OFFICE VISIT (OUTPATIENT)
Age: 70
End: 2025-07-18
Payer: MEDICAID

## 2025-07-18 VITALS
BODY MASS INDEX: 23.98 KG/M2 | DIASTOLIC BLOOD PRESSURE: 85 MMHG | RESPIRATION RATE: 18 BRPM | HEART RATE: 94 BPM | HEIGHT: 72 IN | WEIGHT: 177 LBS | SYSTOLIC BLOOD PRESSURE: 125 MMHG | OXYGEN SATURATION: 97 %

## 2025-07-18 DIAGNOSIS — R97.21 INCREASING PSA LEVEL AFTER TREATMENT FOR PROSTATE CANCER: ICD-10-CM

## 2025-07-18 DIAGNOSIS — C61 MALIGNANT NEOPLASM OF PROSTATE (HCC): Primary | ICD-10-CM

## 2025-07-18 DIAGNOSIS — N20.0 KIDNEY STONE: ICD-10-CM

## 2025-07-18 PROCEDURE — 99214 OFFICE O/P EST MOD 30 MIN: CPT | Performed by: UROLOGY

## 2025-07-18 PROCEDURE — 1123F ACP DISCUSS/DSCN MKR DOCD: CPT | Performed by: UROLOGY

## 2025-07-18 NOTE — PROGRESS NOTES
1. Have you been to the ER, urgent care clinic since your last visit?  Hospitalized since your last visit?no    2. Have you seen or consulted any other [facility] providers [facility]?  Include any pap smears or colon screening. no  Blood pressure 125/85, pulse 94, resp. rate 18, height 1.829 m (6'), weight 80.3 kg (177 lb), SpO2 97%.

## 2025-07-18 NOTE — PROGRESS NOTES
HISTORY OF PRESENT ILLNESS  Kaiser Sal Jr. is a 70 y.o. male.   has a past medical history of Arthritis, Cancer (HCC), Diabetes (HCC), Elevated prostate specific antigen (PSA), Thromboembolus (HCC), Type 2 diabetes mellitus without complication (HCC), and Unspecified symptoms and signs involving the genitourinary system.  has a past surgical history that includes other surgical history; heent (Right); other surgical history; Prostatectomy (08/10/2020); prostate biopsy, needle, saturation sampling (06/05/2020); and Prostate surgery (08/10/2020).  Chief Complaint   Patient presents with    Follow-up     6 Month follow-up     He never felt better.  He denies hot flashes or fatigue.         1. Malignant neoplasm of prostate (HCC)  Overview:  Pretreatment PSA 36.8 with 4.3% free.   He is s/p biopsy on 6/5/2020. 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    He is s/p prostatectomy and PLND on 8/10/2020.  Bladder neck margin positive, Yoni's 4+4, pT3aN0.   PSA had been undetectable until 4/29/22 (0.1).    Started on Orgovyx, February 2023.  PSA went to undetectable.  Assessment & Plan:  PSA recurrence.  Controlled   Orders:  -     PSA, Diagnostic; Future  2. Increasing PSA level after treatment for prostate cancer  Overview:  He is s/p prostatectomy on 8/10/2020.  PSA became detectable at 0.1 on 4/29/22.  8/1/22: 0.1  11/7/22: 0.2  2/10/23: 0.2    Started on orgovyx in Feb, 2023.  PSA went to undetectable.    Assessment & Plan:  Controled on medication. Continue  Orders:  -     PSA, Diagnostic; Future  3. Kidney stone  Overview:  2mm non-obstructive stone seen on CT scan 6/30/2020 right lower pole.        Assessment & Plan:  No flank pains.  Observe      No Known Allergies   Prior to Admission medications    Medication Sig Start Date End Date Taking? Authorizing Provider   LANTUS 100 UNIT/ML injection vial  4/22/25  Yes Provider,  6

## 2025-08-05 DIAGNOSIS — E11.65 TYPE 2 DIABETES MELLITUS WITH HYPERGLYCEMIA, WITH LONG-TERM CURRENT USE OF INSULIN (HCC): ICD-10-CM

## 2025-08-05 DIAGNOSIS — Z79.4 TYPE 2 DIABETES MELLITUS WITH HYPERGLYCEMIA, WITH LONG-TERM CURRENT USE OF INSULIN (HCC): ICD-10-CM

## 2025-08-06 RX ORDER — ASPIRIN 81 MG/1
81 TABLET, COATED ORAL EVERY MORNING
Qty: 90 TABLET | Refills: 3 | Status: SHIPPED | OUTPATIENT
Start: 2025-08-06

## 2025-08-20 LAB
BUN SERPL-MCNC: 24 MG/DL (ref 8–27)
BUN/CREAT SERPL: 24 (ref 10–24)
CALCIUM SERPL-MCNC: 10.3 MG/DL (ref 8.6–10.2)
CHLORIDE SERPL-SCNC: 101 MMOL/L (ref 96–106)
CO2 SERPL-SCNC: 24 MMOL/L (ref 20–29)
CREAT SERPL-MCNC: 1.01 MG/DL (ref 0.76–1.27)
EGFRCR SERPLBLD CKD-EPI 2021: 81 ML/MIN/1.73
GLUCOSE SERPL-MCNC: 161 MG/DL (ref 70–99)
POTASSIUM SERPL-SCNC: 5.6 MMOL/L (ref 3.5–5.2)
SODIUM SERPL-SCNC: 140 MMOL/L (ref 134–144)

## 2025-08-25 ENCOUNTER — OFFICE VISIT (OUTPATIENT)
Facility: CLINIC | Age: 70
End: 2025-08-25
Payer: MEDICAID

## 2025-08-25 VITALS
RESPIRATION RATE: 18 BRPM | OXYGEN SATURATION: 98 % | BODY MASS INDEX: 24.24 KG/M2 | WEIGHT: 179 LBS | DIASTOLIC BLOOD PRESSURE: 80 MMHG | HEART RATE: 90 BPM | TEMPERATURE: 98 F | SYSTOLIC BLOOD PRESSURE: 134 MMHG | HEIGHT: 72 IN

## 2025-08-25 DIAGNOSIS — Z79.4 TYPE 2 DIABETES MELLITUS WITH HYPERGLYCEMIA, WITH LONG-TERM CURRENT USE OF INSULIN (HCC): ICD-10-CM

## 2025-08-25 DIAGNOSIS — E87.5 HYPERKALEMIA: Primary | ICD-10-CM

## 2025-08-25 DIAGNOSIS — E11.65 TYPE 2 DIABETES MELLITUS WITH HYPERGLYCEMIA, WITH LONG-TERM CURRENT USE OF INSULIN (HCC): ICD-10-CM

## 2025-08-25 DIAGNOSIS — R01.1 HEART MURMUR: ICD-10-CM

## 2025-08-25 DIAGNOSIS — C61 MALIGNANT NEOPLASM OF PROSTATE (HCC): ICD-10-CM

## 2025-08-25 DIAGNOSIS — E78.5 HYPERLIPIDEMIA, UNSPECIFIED HYPERLIPIDEMIA TYPE: ICD-10-CM

## 2025-08-25 DIAGNOSIS — Z86.711 HISTORY OF PULMONARY EMBOLISM: ICD-10-CM

## 2025-08-25 DIAGNOSIS — E87.5 HYPERKALEMIA: ICD-10-CM

## 2025-08-25 DIAGNOSIS — F79 INTELLECTUAL DISABILITY: ICD-10-CM

## 2025-08-25 PROCEDURE — 3052F HG A1C>EQUAL 8.0%<EQUAL 9.0%: CPT | Performed by: STUDENT IN AN ORGANIZED HEALTH CARE EDUCATION/TRAINING PROGRAM

## 2025-08-25 PROCEDURE — 1123F ACP DISCUSS/DSCN MKR DOCD: CPT | Performed by: STUDENT IN AN ORGANIZED HEALTH CARE EDUCATION/TRAINING PROGRAM

## 2025-08-25 PROCEDURE — 99214 OFFICE O/P EST MOD 30 MIN: CPT | Performed by: STUDENT IN AN ORGANIZED HEALTH CARE EDUCATION/TRAINING PROGRAM

## 2025-08-25 ASSESSMENT — ENCOUNTER SYMPTOMS
ABDOMINAL PAIN: 0
SHORTNESS OF BREATH: 0
COUGH: 0

## 2025-08-26 LAB
BUN SERPL-MCNC: 23 MG/DL (ref 8–27)
BUN/CREAT SERPL: 23 (ref 10–24)
CALCIUM SERPL-MCNC: 9.9 MG/DL (ref 8.6–10.2)
CHLORIDE SERPL-SCNC: 101 MMOL/L (ref 96–106)
CO2 SERPL-SCNC: 20 MMOL/L (ref 20–29)
CREAT SERPL-MCNC: 0.99 MG/DL (ref 0.76–1.27)
EGFRCR SERPLBLD CKD-EPI 2021: 82 ML/MIN/1.73
GLUCOSE SERPL-MCNC: 147 MG/DL (ref 70–99)
POTASSIUM SERPL-SCNC: 4.6 MMOL/L (ref 3.5–5.2)
SODIUM SERPL-SCNC: 141 MMOL/L (ref 134–144)